# Patient Record
Sex: MALE | Race: WHITE | HISPANIC OR LATINO | Employment: OTHER | URBAN - METROPOLITAN AREA
[De-identification: names, ages, dates, MRNs, and addresses within clinical notes are randomized per-mention and may not be internally consistent; named-entity substitution may affect disease eponyms.]

---

## 2019-01-28 ENCOUNTER — HOSPITAL ENCOUNTER (INPATIENT)
Facility: HOSPITAL | Age: 44
LOS: 16 days | Discharge: HOME | DRG: 974 | End: 2019-02-13
Attending: EMERGENCY MEDICINE | Admitting: EMERGENCY MEDICINE
Payer: MEDICAID

## 2019-01-28 DIAGNOSIS — D61.818 PANCYTOPENIA: ICD-10-CM

## 2019-01-28 DIAGNOSIS — D69.6 THROMBOCYTOPENIA: ICD-10-CM

## 2019-01-28 DIAGNOSIS — B20 AIDS: ICD-10-CM

## 2019-01-28 DIAGNOSIS — A41.9 SEPSIS, DUE TO UNSPECIFIED ORGANISM: ICD-10-CM

## 2019-01-28 DIAGNOSIS — J96.01 ACUTE HYPOXEMIC RESPIRATORY FAILURE: ICD-10-CM

## 2019-01-28 DIAGNOSIS — B20 HIV DISEASE: ICD-10-CM

## 2019-01-28 DIAGNOSIS — B37.0 THRUSH: ICD-10-CM

## 2019-01-28 DIAGNOSIS — Z97.8 ENDOTRACHEALLY INTUBATED: ICD-10-CM

## 2019-01-28 DIAGNOSIS — R53.83 FATIGUE: ICD-10-CM

## 2019-01-28 DIAGNOSIS — J18.9 PNEUMONIA, UNSPECIFIED ORGANISM: Primary | ICD-10-CM

## 2019-01-28 DIAGNOSIS — E87.1 HYPONATREMIA: ICD-10-CM

## 2019-01-28 PROBLEM — E44.0 MALNUTRITION OF MODERATE DEGREE: Status: ACTIVE | Noted: 2019-01-28

## 2019-01-28 PROBLEM — R79.89 ELEVATED LFTS: Status: ACTIVE | Noted: 2019-01-28

## 2019-01-28 PROBLEM — R53.1 WEAKNESS: Status: ACTIVE | Noted: 2019-01-28

## 2019-01-28 PROBLEM — Z72.0 TOBACCO ABUSE: Status: ACTIVE | Noted: 2019-01-28

## 2019-01-28 LAB
ALBUMIN SERPL BCP-MCNC: 1.5 G/DL
ALLENS TEST: ABNORMAL
ALP SERPL-CCNC: 162 U/L
ALT SERPL W/O P-5'-P-CCNC: 46 U/L
ANION GAP SERPL CALC-SCNC: 7 MMOL/L
ANISOCYTOSIS BLD QL SMEAR: SLIGHT
AST SERPL-CCNC: 98 U/L
BASOPHILS NFR BLD: 0 %
BILIRUB SERPL-MCNC: 0.7 MG/DL
BILIRUB UR QL STRIP: NEGATIVE
BUN SERPL-MCNC: 15 MG/DL
CALCIUM SERPL-MCNC: 7.3 MG/DL
CHLORIDE SERPL-SCNC: 91 MMOL/L
CLARITY UR: CLEAR
CO2 SERPL-SCNC: 24 MMOL/L
COLOR UR: YELLOW
CREAT SERPL-MCNC: 0.7 MG/DL
CTP QC/QA: YES
DELSYS: ABNORMAL
DIFFERENTIAL METHOD: ABNORMAL
EOSINOPHIL NFR BLD: 0 %
ERYTHROCYTE [DISTWIDTH] IN BLOOD BY AUTOMATED COUNT: 14.5 %
EST. GFR  (AFRICAN AMERICAN): >60 ML/MIN/1.73 M^2
EST. GFR  (NON AFRICAN AMERICAN): >60 ML/MIN/1.73 M^2
FLOW: 5
FLUAV AG NPH QL: NEGATIVE
FLUBV AG NPH QL: NEGATIVE
GIANT PLATELETS BLD QL SMEAR: PRESENT
GLUCOSE SERPL-MCNC: 104 MG/DL
GLUCOSE UR QL STRIP: NEGATIVE
HCO3 UR-SCNC: 20.6 MMOL/L (ref 24–28)
HCT VFR BLD AUTO: 30.7 %
HGB BLD-MCNC: 10.5 G/DL
HGB UR QL STRIP: NEGATIVE
HIV1+2 IGG SERPL QL IA.RAPID: POSITIVE
HYPOCHROMIA BLD QL SMEAR: ABNORMAL
KETONES UR QL STRIP: NEGATIVE
LACTATE SERPL-SCNC: 2.3 MMOL/L
LACTATE SERPL-SCNC: 2.8 MMOL/L
LDH SERPL L TO P-CCNC: 1023 U/L
LEUKOCYTE ESTERASE UR QL STRIP: NEGATIVE
LYMPHOCYTES NFR BLD: 4 %
MAGNESIUM SERPL-MCNC: 2.1 MG/DL
MCH RBC QN AUTO: 24.6 PG
MCHC RBC AUTO-ENTMCNC: 34.2 G/DL
MCV RBC AUTO: 72 FL
MICROSCOPIC COMMENT: NORMAL
MODE: ABNORMAL
MONOCYTES NFR BLD: 1 %
NEUTROPHILS NFR BLD: 51 %
NEUTS BAND NFR BLD MANUAL: 44 %
NITRITE UR QL STRIP: NEGATIVE
OVALOCYTES BLD QL SMEAR: ABNORMAL
PCO2 BLDA: 29.3 MMHG (ref 35–45)
PH SMN: 7.45 [PH] (ref 7.35–7.45)
PH UR STRIP: 6 [PH] (ref 5–8)
PHOSPHATE SERPL-MCNC: 3.5 MG/DL
PLATELET # BLD AUTO: 75 K/UL
PLATELET BLD QL SMEAR: ABNORMAL
PMV BLD AUTO: ABNORMAL FL
PO2 BLDA: 58 MMHG (ref 80–100)
POC BE: -3 MMOL/L
POC SATURATED O2: 92 % (ref 95–100)
POC TCO2: 21 MMOL/L (ref 23–27)
POIKILOCYTOSIS BLD QL SMEAR: SLIGHT
POTASSIUM SERPL-SCNC: 3.8 MMOL/L
PROT SERPL-MCNC: 5.7 G/DL
PROT UR QL STRIP: NEGATIVE
RBC # BLD AUTO: 4.27 M/UL
SAMPLE: ABNORMAL
SITE: ABNORMAL
SODIUM SERPL-SCNC: 122 MMOL/L
SP GR UR STRIP: 1.01 (ref 1–1.03)
SP02: 89
SQUAMOUS #/AREA URNS HPF: 1 /HPF
TOXIC GRANULES BLD QL SMEAR: PRESENT
TROPONIN I SERPL DL<=0.01 NG/ML-MCNC: <0.006 NG/ML
TSH SERPL DL<=0.005 MIU/L-ACNC: 2.33 UIU/ML
URN SPEC COLLECT METH UR: ABNORMAL
UROBILINOGEN UR STRIP-ACNC: ABNORMAL EU/DL
WBC # BLD AUTO: 3.57 K/UL
WBC TOXIC VACUOLES BLD QL SMEAR: PRESENT

## 2019-01-28 PROCEDURE — 99285 EMERGENCY DEPT VISIT HI MDM: CPT | Mod: 25

## 2019-01-28 PROCEDURE — 25500020 PHARM REV CODE 255: Performed by: EMERGENCY MEDICINE

## 2019-01-28 PROCEDURE — 25000003 PHARM REV CODE 250: Performed by: EMERGENCY MEDICINE

## 2019-01-28 PROCEDURE — 96376 TX/PRO/DX INJ SAME DRUG ADON: CPT

## 2019-01-28 PROCEDURE — 99233 PR SUBSEQUENT HOSPITAL CARE,LEVL III: ICD-10-PCS | Mod: ,,, | Performed by: INTERNAL MEDICINE

## 2019-01-28 PROCEDURE — 84443 ASSAY THYROID STIM HORMONE: CPT

## 2019-01-28 PROCEDURE — 84484 ASSAY OF TROPONIN QUANT: CPT

## 2019-01-28 PROCEDURE — 36600 WITHDRAWAL OF ARTERIAL BLOOD: CPT

## 2019-01-28 PROCEDURE — 96367 TX/PROPH/DG ADDL SEQ IV INF: CPT | Mod: 59

## 2019-01-28 PROCEDURE — 84100 ASSAY OF PHOSPHORUS: CPT

## 2019-01-28 PROCEDURE — 63600175 PHARM REV CODE 636 W HCPCS: Performed by: INTERNAL MEDICINE

## 2019-01-28 PROCEDURE — 63600175 PHARM REV CODE 636 W HCPCS: Performed by: EMERGENCY MEDICINE

## 2019-01-28 PROCEDURE — 80053 COMPREHEN METABOLIC PANEL: CPT

## 2019-01-28 PROCEDURE — 83615 LACTATE (LD) (LDH) ENZYME: CPT

## 2019-01-28 PROCEDURE — 81000 URINALYSIS NONAUTO W/SCOPE: CPT

## 2019-01-28 PROCEDURE — 83735 ASSAY OF MAGNESIUM: CPT

## 2019-01-28 PROCEDURE — 94761 N-INVAS EAR/PLS OXIMETRY MLT: CPT

## 2019-01-28 PROCEDURE — 99900035 HC TECH TIME PER 15 MIN (STAT)

## 2019-01-28 PROCEDURE — 20000000 HC ICU ROOM

## 2019-01-28 PROCEDURE — S0039 INJECTION, SULFAMETHOXAZOLE: HCPCS | Performed by: INTERNAL MEDICINE

## 2019-01-28 PROCEDURE — 85027 COMPLETE CBC AUTOMATED: CPT

## 2019-01-28 PROCEDURE — 87389 HIV-1 AG W/HIV-1&-2 AB AG IA: CPT

## 2019-01-28 PROCEDURE — 93005 ELECTROCARDIOGRAM TRACING: CPT

## 2019-01-28 PROCEDURE — 96366 THER/PROPH/DIAG IV INF ADDON: CPT

## 2019-01-28 PROCEDURE — 63600175 PHARM REV CODE 636 W HCPCS: Performed by: PHYSICIAN ASSISTANT

## 2019-01-28 PROCEDURE — 80074 ACUTE HEPATITIS PANEL: CPT

## 2019-01-28 PROCEDURE — 25000003 PHARM REV CODE 250: Performed by: INTERNAL MEDICINE

## 2019-01-28 PROCEDURE — 86703 HIV-1/HIV-2 1 RESULT ANTBDY: CPT | Mod: 91

## 2019-01-28 PROCEDURE — 93010 EKG 12-LEAD: ICD-10-PCS | Mod: ,,, | Performed by: INTERNAL MEDICINE

## 2019-01-28 PROCEDURE — 93010 ELECTROCARDIOGRAM REPORT: CPT | Mod: ,,, | Performed by: INTERNAL MEDICINE

## 2019-01-28 PROCEDURE — 94640 AIRWAY INHALATION TREATMENT: CPT

## 2019-01-28 PROCEDURE — 96365 THER/PROPH/DIAG IV INF INIT: CPT

## 2019-01-28 PROCEDURE — 83605 ASSAY OF LACTIC ACID: CPT | Mod: 91

## 2019-01-28 PROCEDURE — 96361 HYDRATE IV INFUSION ADD-ON: CPT

## 2019-01-28 PROCEDURE — 85007 BL SMEAR W/DIFF WBC COUNT: CPT

## 2019-01-28 PROCEDURE — 25000242 PHARM REV CODE 250 ALT 637 W/ HCPCS: Performed by: EMERGENCY MEDICINE

## 2019-01-28 PROCEDURE — 86361 T CELL ABSOLUTE COUNT: CPT

## 2019-01-28 PROCEDURE — 96375 TX/PRO/DX INJ NEW DRUG ADDON: CPT

## 2019-01-28 PROCEDURE — 86703 HIV-1/HIV-2 1 RESULT ANTBDY: CPT

## 2019-01-28 PROCEDURE — 82803 BLOOD GASES ANY COMBINATION: CPT

## 2019-01-28 PROCEDURE — 87040 BLOOD CULTURE FOR BACTERIA: CPT | Mod: 59

## 2019-01-28 PROCEDURE — 99233 SBSQ HOSP IP/OBS HIGH 50: CPT | Mod: ,,, | Performed by: INTERNAL MEDICINE

## 2019-01-28 RX ORDER — IBUPROFEN 600 MG/1
600 TABLET ORAL
Status: COMPLETED | OUTPATIENT
Start: 2019-01-28 | End: 2019-01-28

## 2019-01-28 RX ORDER — ACETAMINOPHEN 500 MG
1000 TABLET ORAL
Status: COMPLETED | OUTPATIENT
Start: 2019-01-28 | End: 2019-01-28

## 2019-01-28 RX ORDER — ONDANSETRON 8 MG/1
8 TABLET, ORALLY DISINTEGRATING ORAL EVERY 8 HOURS PRN
Status: DISCONTINUED | OUTPATIENT
Start: 2019-01-28 | End: 2019-02-13 | Stop reason: HOSPADM

## 2019-01-28 RX ORDER — VANCOMYCIN HCL IN 5 % DEXTROSE 1G/250ML
15 PLASTIC BAG, INJECTION (ML) INTRAVENOUS
Status: DISCONTINUED | OUTPATIENT
Start: 2019-01-28 | End: 2019-01-29

## 2019-01-28 RX ORDER — METHYLPREDNISOLONE SOD SUCC 125 MG
60 VIAL (EA) INJECTION EVERY 6 HOURS
Status: DISCONTINUED | OUTPATIENT
Start: 2019-01-28 | End: 2019-02-04

## 2019-01-28 RX ORDER — SODIUM CHLORIDE 0.9 % (FLUSH) 0.9 %
5 SYRINGE (ML) INJECTION
Status: DISCONTINUED | OUTPATIENT
Start: 2019-01-28 | End: 2019-01-30

## 2019-01-28 RX ORDER — IPRATROPIUM BROMIDE AND ALBUTEROL SULFATE 2.5; .5 MG/3ML; MG/3ML
3 SOLUTION RESPIRATORY (INHALATION)
Status: COMPLETED | OUTPATIENT
Start: 2019-01-28 | End: 2019-01-28

## 2019-01-28 RX ORDER — SODIUM CHLORIDE 9 MG/ML
INJECTION, SOLUTION INTRAVENOUS CONTINUOUS
Status: DISCONTINUED | OUTPATIENT
Start: 2019-01-28 | End: 2019-01-30

## 2019-01-28 RX ORDER — FAMOTIDINE 20 MG/1
20 TABLET, FILM COATED ORAL 2 TIMES DAILY
Status: DISCONTINUED | OUTPATIENT
Start: 2019-01-28 | End: 2019-02-12

## 2019-01-28 RX ORDER — ACETAMINOPHEN 325 MG/1
650 TABLET ORAL EVERY 8 HOURS PRN
Status: DISCONTINUED | OUTPATIENT
Start: 2019-01-28 | End: 2019-02-03

## 2019-01-28 RX ORDER — VANCOMYCIN HCL IN 5 % DEXTROSE 1G/250ML
15 PLASTIC BAG, INJECTION (ML) INTRAVENOUS
Status: COMPLETED | OUTPATIENT
Start: 2019-01-28 | End: 2019-01-28

## 2019-01-28 RX ADMIN — AZITHROMYCIN MONOHYDRATE 500 MG: 500 INJECTION, POWDER, LYOPHILIZED, FOR SOLUTION INTRAVENOUS at 03:01

## 2019-01-28 RX ADMIN — PIPERACILLIN AND TAZOBACTAM 4.5 G: 4; .5 INJECTION, POWDER, LYOPHILIZED, FOR SOLUTION INTRAVENOUS; PARENTERAL at 10:01

## 2019-01-28 RX ADMIN — METHYLPREDNISOLONE SODIUM SUCCINATE 60 MG: 125 INJECTION, POWDER, FOR SOLUTION INTRAMUSCULAR; INTRAVENOUS at 01:01

## 2019-01-28 RX ADMIN — CEFTRIAXONE 2 G: 2 INJECTION, SOLUTION INTRAVENOUS at 04:01

## 2019-01-28 RX ADMIN — IPRATROPIUM BROMIDE AND ALBUTEROL SULFATE 3 ML: .5; 3 SOLUTION RESPIRATORY (INHALATION) at 09:01

## 2019-01-28 RX ADMIN — IBUPROFEN 600 MG: 600 TABLET ORAL at 10:01

## 2019-01-28 RX ADMIN — METHYLPREDNISOLONE SODIUM SUCCINATE 60 MG: 125 INJECTION, POWDER, FOR SOLUTION INTRAMUSCULAR; INTRAVENOUS at 11:01

## 2019-01-28 RX ADMIN — FAMOTIDINE 20 MG: 20 TABLET ORAL at 08:01

## 2019-01-28 RX ADMIN — IPRATROPIUM BROMIDE AND ALBUTEROL SULFATE 3 ML: .5; 3 SOLUTION RESPIRATORY (INHALATION) at 11:01

## 2019-01-28 RX ADMIN — SODIUM CHLORIDE: 0.9 INJECTION, SOLUTION INTRAVENOUS at 08:01

## 2019-01-28 RX ADMIN — VANCOMYCIN HYDROCHLORIDE 1000 MG: 1 INJECTION, POWDER, LYOPHILIZED, FOR SOLUTION INTRAVENOUS at 10:01

## 2019-01-28 RX ADMIN — VANCOMYCIN HYDROCHLORIDE 1000 MG: 1 INJECTION, POWDER, FOR SOLUTION INTRAVENOUS at 11:01

## 2019-01-28 RX ADMIN — IOHEXOL 75 ML: 350 INJECTION, SOLUTION INTRAVENOUS at 10:01

## 2019-01-28 RX ADMIN — SODIUM CHLORIDE 1000 ML: 0.9 INJECTION, SOLUTION INTRAVENOUS at 09:01

## 2019-01-28 RX ADMIN — SULFAMETHOXAZOLE AND TRIMETHOPRIM 295 MG: 80; 16 INJECTION, SOLUTION, CONCENTRATE INTRAVENOUS at 12:01

## 2019-01-28 RX ADMIN — METHYLPREDNISOLONE SODIUM SUCCINATE 60 MG: 125 INJECTION, POWDER, FOR SOLUTION INTRAMUSCULAR; INTRAVENOUS at 06:01

## 2019-01-28 RX ADMIN — ACETAMINOPHEN 1000 MG: 500 TABLET, FILM COATED ORAL at 09:01

## 2019-01-28 RX ADMIN — PIPERACILLIN AND TAZOBACTAM 4.5 G: 4; .5 INJECTION, POWDER, LYOPHILIZED, FOR SOLUTION INTRAVENOUS; PARENTERAL at 08:01

## 2019-01-28 RX ADMIN — SULFAMETHOXAZOLE AND TRIMETHOPRIM 295 MG: 80; 16 INJECTION, SOLUTION, CONCENTRATE INTRAVENOUS at 08:01

## 2019-01-28 RX ADMIN — SODIUM CHLORIDE 1000 ML: 0.9 INJECTION, SOLUTION INTRAVENOUS at 10:01

## 2019-01-28 NOTE — HPI
"Mr. Donnelly is a 42 yo M with no reported past medical history- presents with a CC of weakness and some SOB for one week. He notes that he was treated with abx for a "pneumonia" 1/13-1/23.  He presents to the ED and is found to have thrush. Further, his HIV is +. The patient was started on treatment for PJP pneumonia and ID was consulted. The patient is thin and ill appearing.    "

## 2019-01-28 NOTE — SUBJECTIVE & OBJECTIVE
History reviewed. No pertinent past medical history.    Past Surgical History:   Procedure Laterality Date    APPENDECTOMY         Review of patient's allergies indicates:  No Known Allergies    No current facility-administered medications on file prior to encounter.      Current Outpatient Medications on File Prior to Encounter   Medication Sig    famotidine (PEPCID) 20 MG tablet Take 1 tablet (20 mg total) by mouth once daily.     Family History     Problem Relation (Age of Onset)    No Known Problems Mother, Father        Tobacco Use    Smoking status: Current Every Day Smoker    Smokeless tobacco: Never Used   Substance and Sexual Activity    Alcohol use: Yes    Drug use: No    Sexual activity: Yes     Partners: Female     Review of Systems   Unable to perform ROS: Other     Objective:     Vital Signs (Most Recent):  Temp: (!) 101 °F (38.3 °C) (01/28/19 1052)  Pulse: 110 (01/28/19 1133)  Resp: (!) 24 (01/28/19 1133)  BP: 111/62(Dr. De La O notified) (01/28/19 0902)  SpO2: (!) 89 % (01/28/19 1133) Vital Signs (24h Range):  Temp:  [97.5 °F (36.4 °C)-101.1 °F (38.4 °C)] 101 °F (38.3 °C)  Pulse:  [] 110  Resp:  [18-24] 24  SpO2:  [89 %-98 %] 89 %  BP: ()/(57-76) 111/62     Weight: 59 kg (130 lb)  Body mass index is 20.98 kg/m².    Physical Exam   Constitutional: He is oriented to person, place, and time. He appears well-developed.   HENT:   Head: Normocephalic.   Tongue + for thrush.   Cardiovascular: Normal rate and regular rhythm. Exam reveals no gallop and no friction rub.   Pulmonary/Chest: Effort normal. No stridor. No respiratory distress. He has no wheezes.   Abdominal: Soft. Bowel sounds are normal. There is no tenderness.   Neurological: He is alert and oriented to person, place, and time.   Skin: Skin is warm and dry.   Psychiatric: He has a normal mood and affect. His behavior is normal.   Vitals reviewed.          Significant Labs:   Blood Culture: No results for input(s): LABBLOO in the  last 48 hours.  BMP:   Recent Labs   Lab 01/28/19  0700      *   K 3.8   CL 91*   CO2 24   BUN 15   CREATININE 0.7   CALCIUM 7.3*   MG 2.1     CBC:   Recent Labs   Lab 01/28/19  0842   WBC 3.57*   HGB 10.5*   HCT 30.7*   PLT 75*       Significant Imaging: CXR- reviewed

## 2019-01-28 NOTE — ED PROVIDER NOTES
"Encounter Date: 1/28/2019    SCRIBE #1 NOTE: I, Raphael Bowenjayne, am scribing for, and in the presence of,  Guerita De La O MD. I have scribed the following portions of the note - Other sections scribed: HPI, ROS and PE.       History     Chief Complaint   Patient presents with    Fatigue     x8 days Pt states "i fell bad "      CC: Fatigue    HPI: This is a 43 y.o. male no PMHx who presents with an 8-day history of generalized fatigue. Patient was diagnosed with pneumonia at an unknown clinic on the 13th or 14th and was given a course for antibiotics which he completed on the 23rd. States he felt better for a few days but began feeling bad again a couple of days ago. No other pmhx. No meds. Conversations were via ED . No rashes, no cp, no sob, no abd pain, n/v/d.      The history is provided by the patient. The history is limited by a language barrier. A  was used.     Review of patient's allergies indicates:  No Known Allergies  Past Medical History:   Diagnosis Date    Cigarette smoker     HIV (human immunodeficiency virus infection) 01/28/2019    Pneumonia 01/28/2019    Unintentional weight loss 01/28/2019    Weakness 01/28/2019     Past Surgical History:   Procedure Laterality Date    APPENDECTOMY       Family History   Problem Relation Age of Onset    No Known Problems Mother     No Known Problems Father      Social History     Tobacco Use    Smoking status: Current Every Day Smoker     Packs/day: 1.00    Smokeless tobacco: Never Used   Substance Use Topics    Alcohol use: Yes    Drug use: No     Review of Systems   Constitutional: Positive for activity change, appetite change, chills and fatigue.   Respiratory: Positive for cough.    All other systems reviewed and are negative.      Physical Exam     Initial Vitals [01/28/19 0618]   BP Pulse Resp Temp SpO2   112/76 96 18 97.5 °F (36.4 °C) 98 %      MAP       --         Physical Exam    Nursing note and vitals " reviewed.  Constitutional: He is not diaphoretic. No distress.   Pt appears thin. Appears to not feel well. Appears  Chronically ill.    HENT:   Head: Normocephalic and atraumatic.   Mouth/Throat: Oropharynx is clear and moist.   He has thrush diffusely over tongue, posterior oropharynx, bilateral buccal mucosa.   Eyes: Conjunctivae and EOM are normal. Pupils are equal, round, and reactive to light. No scleral icterus.   Neck: Normal range of motion. Neck supple. No JVD present.   Cardiovascular: Normal rate, regular rhythm and intact distal pulses.   Pulmonary/Chest: No stridor. No respiratory distress.   Minimal crackles to bilateral bases.   Abdominal: Soft. Bowel sounds are normal. He exhibits no distension. There is no tenderness.   Musculoskeletal: Normal range of motion. He exhibits no edema or tenderness.   Neurological: He is alert and oriented to person, place, and time. He has normal strength. No cranial nerve deficit.   Skin: Skin is warm and dry. No rash noted.   Psychiatric: He has a normal mood and affect. Thought content normal.         ED Course   Critical Care  Date/Time: 1/29/2019 4:46 AM  Performed by: Guerita De La O MD  Authorized by: Steph Guillen MD   Direct patient critical care time: 15 minutes  Ordering / reviewing critical care time: 10 minutes  Documentation critical care time: 10 minutes  Consulting other physicians critical care time: 10 minutes  Total critical care time (exclusive of procedural time) : 45 minutes        Labs Reviewed   CBC W/ AUTO DIFFERENTIAL - Abnormal; Notable for the following components:       Result Value    WBC 3.57 (*)     RBC 4.27 (*)     Hemoglobin 10.5 (*)     Hematocrit 30.7 (*)     MCV 72 (*)     MCH 24.6 (*)     Platelets 75 (*)     Lymph% 4.0 (*)     Mono% 1.0 (*)     Platelet Estimate Decreased (*)     All other components within normal limits    Narrative:     Recoll. [id number] by CHAD at 01/28/2019 08:01, reason: Questionable   results;  recollect to verify  notified Amanda Mathew; Rn to redraw   COMPREHENSIVE METABOLIC PANEL - Abnormal; Notable for the following components:    Sodium 122 (*)     Chloride 91 (*)     Calcium 7.3 (*)     Total Protein 5.7 (*)     Albumin 1.5 (*)     Alkaline Phosphatase 162 (*)     AST 98 (*)     ALT 46 (*)     Anion Gap 7 (*)     All other components within normal limits   URINALYSIS, REFLEX TO URINE CULTURE - Abnormal; Notable for the following components:    Urobilinogen, UA 2.0-3.0 (*)     All other components within normal limits    Narrative:     Preferred Collection Type->Urine, Clean Catch   LACTIC ACID, PLASMA - Abnormal; Notable for the following components:    Lactate (Lactic Acid) 2.3 (*)     All other components within normal limits   RAPID HIV - Abnormal; Notable for the following components:    HIV Rapid Testing Positive (*)     All other components within normal limits    Narrative:       Rapid HIV critical result(s) called and verbal readback obtained   from   Karissa Bowling., 01/28/2019 11:08   LACTATE DEHYDROGENASE - Abnormal; Notable for the following components:    LD 1,023 (*)     All other components within normal limits   LACTIC ACID, PLASMA - Abnormal; Notable for the following components:    Lactate (Lactic Acid) 2.8 (*)     All other components within normal limits   ISTAT PROCEDURE - Abnormal; Notable for the following components:    POC PH 7.454 (*)     POC PCO2 29.3 (*)     POC PO2 58 (*)     POC HCO3 20.6 (*)     POC SATURATED O2 92 (*)     POC TCO2 21 (*)     All other components within normal limits   CULTURE, BLOOD   CULTURE, BLOOD   AFB CULTURE & SMEAR   AFB CULTURE & SMEAR   AFB CULTURE & SMEAR   AFB CULTURE & SMEAR   CULTURE, RESPIRATORY   MAGNESIUM   PHOSPHORUS   TROPONIN I   TSH   URINALYSIS MICROSCOPIC    Narrative:     Preferred Collection Type->Urine, Clean Catch   HIV 1 / 2 ANTIBODY   T-HELPER CELLS (CD4) COUNT   HEPATITIS PANEL, ACUTE   M. TUBERCULOSIS DNA BY PCR   M. TUBERCULOSIS  DNA BY PCR   POCT INFLUENZA A/B     EKG Readings: (Independently Interpreted)   Initial Reading: No STEMI. Rhythm: Normal Sinus Rhythm. Ectopy: No Ectopy. Conduction: Normal.     ECG Results          EKG 12-lead (Final result)  Result time 01/28/19 16:19:09    Final result by Interface, Lab In [facility] (01/28/19 16:19:09)                 Narrative:    Test Reason : R53.83,    Vent. Rate : 093 BPM     Atrial Rate : 093 BPM     P-R Int : 144 ms          QRS Dur : 110 ms      QT Int : 376 ms       P-R-T Axes : 071 073 062 degrees     QTc Int : 467 ms    Normal sinus rhythm  Normal ECG  No previous ECGs available  Confirmed by Evans OROZCO, Nj ([id number]) on 1/28/2019 4:18:59 PM    Referred By: [email or web address]   SELF           Confirmed By:Nj Krueger MD                            Imaging Results          CTA Chest Non-Coronary (PE Study) (Final result)  Result time 01/28/19 12:04:47    Final result by Duglas Evans MD (01/28/19 12:04:47)                 Impression:      1. No pulmonary arterial thromboembolus.  2. 1.8 cm irregular, cavitary opacity within the anterior segment of the right upper lobe, innumerable randomly distributed bilateral pulmonary nodules, patchy areas of subsegmental consolidation and nodular thickening of the right major and minor fissures.  Constellation of findings is most concerning for an infectious granulomatous process (medially TB, fungal) with less likely differential considerations including a non infectious granulomatous process (sarcoidosis) and acute hypersensitivity pneumonitis.  3. Increased number of slightly prominent mediastinal lymph nodes, possibly inflammatory or infectious.  4. Small bilateral dependent pleural effusions.  Preliminary findings discussed with Dr. De La O at 11:45.      Electronically signed by: Duglas Evans MD  Date:    01/28/2019  Time:    12:04             Narrative:    EXAMINATION:  CTA CHEST NON CORONARY    CLINICAL HISTORY:  tachycardia,  hypoxemia;    TECHNIQUE:  Low dose axial images, sagittal and coronal reformations were obtained from the thoracic inlet to the lung bases following the IV administration of 75 mL of Omnipaque 350.  Contrast timing was optimized to evaluate the pulmonary arteries.  MIP images were performed.    COMPARISON:  Radiograph 01/28/2019.    FINDINGS:  Structures at the base of the neck are unremarkable.    There is a left-sided aortic arch with 3 branch vessels.  The thoracic aorta tapers normally without atherosclerotic calcification.  No dissection.    Pulmonary arteries distribute normally.  No definite filling defects identified up to the level of the proximal segmental pulmonary arteries.  Distal segmental and subsegmental vessels are not well evaluated due to adjacent parenchymal disease.    Heart is normal in size.  No pericardial effusion.    There is an increased number of slightly prominent mediastinal and bilateral hilar lymph nodes.  No axillary lymphadenopathy.    The esophagus is normal in caliber and course.    Trachea and proximal airways are patent.    There is a 1.8 cm irregular, cavitary opacity within the anterior segment of the right upper lobe that demonstrates a thick, slightly irregular wall measuring up to 4 mm.  Innumerable, randomly distributed pulmonary nodules are identified throughout both lungs, most of which are in the 1-2 mm range.  Patchy areas of consolidation noted within the dependent portions of the lower lobes.  There is thickening/nodularity involving the right minor and major fissures.  Small bilateral dependent pleural effusions identified.  No bronchiectasis.    The visualized spleen appears enlarged.  Limited evaluation of the remaining visualized upper abdominal structures demonstrates no significant abnormalities.    Subcutaneous soft tissues are within normal limits.    No acute fractures or osseous destruction.                               X-Ray Chest PA And Lateral (Final  result)  Result time 01/28/19 08:16:49    Final result by Carlos Manuel Moura MD (01/28/19 08:16:49)                 Impression:      Moderate lung opacification, possible cardiogenic/ noncardiogenic pulmonary edema, pneumonia, or aspiration.  Short-term imaging follow-up could be performed to ensure resolution.    Small pleural effusions.      Electronically signed by: Carlos Manuel Moura MD  Date:    01/28/2019  Time:    08:16             Narrative:    EXAMINATION:  XR CHEST PA AND LATERAL    CLINICAL HISTORY:  fatigue;    TECHNIQUE:  PA and lateral views of the chest were performed.    COMPARISON:  None    FINDINGS:  The cardiomediastinal contour is within normal limits.  There is moderate diffuse lung opacification.  No pneumothorax.  Probable small effusions.                                 Medical Decision Making:   Clinical Tests:   Lab Tests: Ordered and Reviewed  Radiological Study: Ordered and Reviewed  Medical Tests: Ordered and Reviewed  ED Management:  Mr Donnelly vitals were stable initially. About 1-2 hours into his stay, he developed a fever and his HR went up.   Tylenol given. cxr noted. Labs noted. Pt w hyponatremia. cxr w pneumonia picture. Pt appears chronically ill. Discussed w Dr. Mai, will admit pt to hospital. Currently on 4-5L NC, no tachypnea, has had O2 requirement since onset of fever. CT chest ordered.     CT chest returned, pt w appearance of diffuse TB, atypical pneumonia. Rapid HIV positive. Pt placed on isolation. Dr. Mai saw pt in ED. Admission orders written, awaiting bed placement. Pt occasionally drops sats to upper 80s then quickly comes back to upper 90s. ABG noted, pao2 58, pt placed on 50% venti mask, Dr. Mai notified.     Pt awaiting bed. Noted to drop sats to high 70s when resting, good waveform. Intermittently tachypneic. States he still feels fine. Intermittent bp drop below 100. I have concerns about decompensation. He has been seen by ID, additional orders in place  by Dr. Mai. I spoke w Dr. Edmondson and will be upgrading pt to ICU given some vitals changing in past hours. All are in agreement. Pt has been on isolation. His MS has remained unchanged.             Scribe Attestation:   Scribe #1: I performed the above scribed service and the documentation accurately describes the services I performed. I attest to the accuracy of the note.    Attending Attestation:           Physician Attestation for Scribe:  Physician Attestation Statement for Scribe #1: I, Guerita De La O MD, reviewed documentation, as scribed by Raphael Allen in my presence, and it is both accurate and complete.                    Clinical Impression:   The primary encounter diagnosis was Pneumonia, unspecified organism. Diagnoses of Fatigue, Hyponatremia, Thrush, and Sepsis, due to unspecified organism were also pertinent to this visit.                             Guerita De La O MD  01/29/19 9875

## 2019-01-28 NOTE — ASSESSMENT & PLAN NOTE
With hypoxia and sepsis. Will have broad spectrum Abx + treatment for PJP.  +/- steroids. Will discuss with ID.

## 2019-01-28 NOTE — HOSPITAL COURSE
Mr. Donnelly presented with shortness of breath and was admitted for treatment of PJP pneumonia and ID was consulted. The patient had a CTA of his chest that was negative for PE but was remarkable for a 1.8 cm irregular, cavitary opacity within the anterior segment of the right upper lobe, innumerable randomly distributed bilateral pulmonary nodules, patchy areas of subsegmental consolidation and nodular thickening of the right major and minor fissures, and mediastinal adenopathy.  Patient was started on empiric IV Bactrim and steroids.  He was transferred to the ICU due to high O2 requirements, rising lactate, and marginal blood pressures.  Pancytopenia was also present and worsened.  He was transfused 1 unit of platelets.  His blood pressures responded to IV fluid to alone and he did not require vasopressors. Pulmonology consulted for possible bronch in new diagnosis of HIV, and concern for PJP.  ID was also consulted.  On 1/30, CD4 count resulted at 11.  ID recommended bactrim and rocephin for probable PJP and CAP, respectively. Diflucan for oral candida. Pancytopenia likely due to sepsis, possible opportunistic infections and AIDS. Transaminases and lactate/LDH rising. On 1/31, patient underwent bronchoscopy and remained on the ventilator postprocedure.  He was later extubated on 2/01 with use of BiPAP p.r.n. Rapid AFB stain resulted negative and was taken off isolation on 2/2.  His oxygen requirements continued to decline and liver function tests continue to improve.  He was stable for transfer to floor on 2/3. Oxygen requirements improving with intermittent doses of IV furosemide 20 mg for 3 days.     Pt was weaned from oxygen and did not meet criteria for home oxygen on discharge home. ID made final recommendations:  Histoplasma pneumonia - itraconazole BID, repeat level in 14 days  Bactrim DS daily  - PJP ppx  Follow up Dr. Shine @[facility]  - HIV/HAART therapy

## 2019-01-28 NOTE — H&P
"[facility] Hospital Medicine  History & Physical    Patient Name: Lior Prado  MRN: [id number]  Admission Date: 1/28/2019  Attending Physician: Guerita De La O MD   Primary Care Provider: Primary Doctor No         Patient information was obtained from patient and ER records.     Subjective:     Principal Problem:HIV disease    Chief Complaint:   Chief Complaint   Patient presents with    Fatigue     x8 days Pt states "i fell bad "         HPI: Mr. Donnelly is a 44 yo M with no reported past medical history- presents with a CC of weakness and some SOB for one week. He notes that he was treated with abx for a "pneumonia" 1/13-1/23.  He presents to the ED and is found to have thrush. Further, his HIV is +. The patient was started on treatment for PJP pneumonia and ID was consulted. The patient is thin and ill appearing.      History reviewed. No pertinent past medical history.    Past Surgical History:   Procedure Laterality Date    APPENDECTOMY         Review of patient's allergies indicates:  No Known Allergies    No current facility-administered medications on file prior to encounter.      Current Outpatient Medications on File Prior to Encounter   Medication Sig    famotidine (PEPCID) 20 MG tablet Take 1 tablet (20 mg total) by mouth once daily.     Family History     Problem Relation (Age of Onset)    No Known Problems Mother, Father        Tobacco Use    Smoking status: Current Every Day Smoker    Smokeless tobacco: Never Used   Substance and Sexual Activity    Alcohol use: Yes    Drug use: No    Sexual activity: Yes     Partners: Female     Review of Systems   Unable to perform ROS: Other     Objective:     Vital Signs (Most Recent):  Temp: (!) 101 °F (38.3 °C) (01/28/19 1052)  Pulse: 110 (01/28/19 1133)  Resp: (!) 24 (01/28/19 1133)  BP: 111/62(Dr. De La O notified) (01/28/19 0902)  SpO2: (!) 89 % (01/28/19 1133) Vital Signs (24h Range):  Temp:  [97.5 °F (36.4 °C)-101.1 °F " (38.4 °C)] 101 °F (38.3 °C)  Pulse:  [] 110  Resp:  [18-24] 24  SpO2:  [89 %-98 %] 89 %  BP: ()/(57-76) 111/62     Weight: 59 kg (130 lb)  Body mass index is 20.98 kg/m².    Physical Exam   Constitutional: He is oriented to person, place, and time. He appears well-developed.   HENT:   Head: Normocephalic.   Tongue + for thrush.   Cardiovascular: Normal rate and regular rhythm. Exam reveals no gallop and no friction rub.   Pulmonary/Chest: Effort normal. No stridor. No respiratory distress. He has no wheezes.   Abdominal: Soft. Bowel sounds are normal. There is no tenderness.   Neurological: He is alert and oriented to person, place, and time.   Skin: Skin is warm and dry.   Psychiatric: He has a normal mood and affect. His behavior is normal.   Vitals reviewed.          Significant Labs:   Blood Culture: No results for input(s): LABBLOO in the last 48 hours.  BMP:   Recent Labs   Lab 01/28/19  0700      *   K 3.8   CL 91*   CO2 24   BUN 15   CREATININE 0.7   CALCIUM 7.3*   MG 2.1     CBC:   Recent Labs   Lab 01/28/19  0842   WBC 3.57*   HGB 10.5*   HCT 30.7*   PLT 75*       Significant Imaging: CXR- reviewed     Assessment/Plan:     * HIV disease           Pneumonia, unspecified organism    With hypoxia and sepsis. Will have broad spectrum Abx + treatment for PJP.  +/- steroids. Will discuss with ID.          Elevated LFTs    Not sure of cause- from ETOH? Vs other. Will check Hep panel.        Malnutrition of moderate degree    Will discuss with patient        Tobacco abuse    Smoking cessation education        Hyponatremia    Likely from lung involvement ?  SIADH?       Weakness    Will consider PT/OT consult        Thrush    Nystatin vs. Diflucan       Leukopenia- from HIV disease  VTE Risk Mitigation (From admission, onward)    None        Concerned about this patient. Monitor closely. To ICU if any decompensation. Have a call placed to ID to review case/treatment.  Started IV steroids  and treatment for PJP.  When I evaluated patient he was comfortable on NC.  Will check on before I leave hospital today.         Mac Marc MD  Department of Hospital Medicine   [facility]

## 2019-01-28 NOTE — ED TRIAGE NOTES
Pt presents to ED with c/o SOB and weakness onset this AM. Pt reports pneumonia diagnosis last month. Denies CP, nausea or dizziness

## 2019-01-29 PROBLEM — J96.01 ACUTE HYPOXEMIC RESPIRATORY FAILURE: Status: ACTIVE | Noted: 2019-01-29

## 2019-01-29 PROBLEM — D61.818 PANCYTOPENIA: Status: ACTIVE | Noted: 2019-01-29

## 2019-01-29 LAB
ABO + RH BLD: NORMAL
ALBUMIN SERPL BCP-MCNC: 1.2 G/DL
ALP SERPL-CCNC: 130 U/L
ALT SERPL W/O P-5'-P-CCNC: 32 U/L
ANION GAP SERPL CALC-SCNC: 8 MMOL/L
ANISOCYTOSIS BLD QL SMEAR: SLIGHT
APTT BLDCRRT: 34.5 SEC
AST SERPL-CCNC: 97 U/L
BASOPHILS # BLD AUTO: 0.01 K/UL
BASOPHILS NFR BLD: 0.5 %
BILIRUB SERPL-MCNC: 0.4 MG/DL
BLD GP AB SCN CELLS X3 SERPL QL: NORMAL
BLD PROD TYP BPU: NORMAL
BLOOD UNIT EXPIRATION DATE: NORMAL
BLOOD UNIT TYPE CODE: 5100
BLOOD UNIT TYPE: NORMAL
BNP SERPL-MCNC: 24 PG/ML
BUN SERPL-MCNC: 14 MG/DL
CALCIUM SERPL-MCNC: 7.2 MG/DL
CD3+CD4+ CELLS # BLD: 11 CELLS/UL (ref 300–1400)
CD3+CD4+ CELLS NFR BLD: 10.7 % (ref 28–57)
CHLORIDE SERPL-SCNC: 105 MMOL/L
CO2 SERPL-SCNC: 22 MMOL/L
CODING SYSTEM: NORMAL
CORTIS SERPL-MCNC: 6.8 UG/DL
CREAT SERPL-MCNC: 0.7 MG/DL
DIFFERENTIAL METHOD: ABNORMAL
DISPENSE STATUS: NORMAL
EOSINOPHIL # BLD AUTO: 0 K/UL
EOSINOPHIL NFR BLD: 0 %
ERYTHROCYTE [DISTWIDTH] IN BLOOD BY AUTOMATED COUNT: 14.3 %
EST. GFR  (AFRICAN AMERICAN): >60 ML/MIN/1.73 M^2
EST. GFR  (NON AFRICAN AMERICAN): >60 ML/MIN/1.73 M^2
GLUCOSE SERPL-MCNC: 112 MG/DL
HAV IGM SERPL QL IA: NEGATIVE
HBV CORE IGM SERPL QL IA: NEGATIVE
HBV SURFACE AG SERPL QL IA: NEGATIVE
HCT VFR BLD AUTO: 31.9 %
HCV AB SERPL QL IA: NEGATIVE
HGB BLD-MCNC: 10.8 G/DL
HIV 1+2 AB+HIV1 P24 AG SERPL QL IA: POSITIVE
HIV SUPPLEMENTAL ASSAY INTERPRETATION: ABNORMAL
HIV-1 RESULT: POSITIVE
HIV-2 RESULT: ABNORMAL
INR PPP: 1.3
LACTATE SERPL-SCNC: 3.1 MMOL/L
LACTATE SERPL-SCNC: 3.3 MMOL/L
LACTATE SERPL-SCNC: 3.8 MMOL/L
LACTATE SERPL-SCNC: 3.9 MMOL/L
LYMPHOCYTES # BLD AUTO: 0.2 K/UL
LYMPHOCYTES NFR BLD: 9.4 %
MCH RBC QN AUTO: 25.3 PG
MCHC RBC AUTO-ENTMCNC: 33.9 G/DL
MCV RBC AUTO: 75 FL
MONOCYTES # BLD AUTO: 0 K/UL
MONOCYTES NFR BLD: 0 %
NEUTROPHILS # BLD AUTO: 1.9 K/UL
NEUTROPHILS NFR BLD: 91 %
OVALOCYTES BLD QL SMEAR: ABNORMAL
PLATELET # BLD AUTO: 12 K/UL
PLATELET BLD QL SMEAR: ABNORMAL
PMV BLD AUTO: ABNORMAL FL
POIKILOCYTOSIS BLD QL SMEAR: SLIGHT
POTASSIUM SERPL-SCNC: 3.9 MMOL/L
PROT SERPL-MCNC: 4.9 G/DL
PROTHROMBIN TIME: 13.2 SEC
RBC # BLD AUTO: 4.27 M/UL
SODIUM SERPL-SCNC: 135 MMOL/L
TRANS PLATPHERESIS VOL PATIENT: NORMAL ML
VANCOMYCIN TROUGH SERPL-MCNC: 8.1 UG/ML
WBC # BLD AUTO: 2.12 K/UL

## 2019-01-29 PROCEDURE — 20000000 HC ICU ROOM

## 2019-01-29 PROCEDURE — 80202 ASSAY OF VANCOMYCIN: CPT

## 2019-01-29 PROCEDURE — 25000003 PHARM REV CODE 250: Performed by: HOSPITALIST

## 2019-01-29 PROCEDURE — 80053 COMPREHEN METABOLIC PANEL: CPT

## 2019-01-29 PROCEDURE — 36430 TRANSFUSION BLD/BLD COMPNT: CPT

## 2019-01-29 PROCEDURE — 63600175 PHARM REV CODE 636 W HCPCS: Performed by: INTERNAL MEDICINE

## 2019-01-29 PROCEDURE — 83605 ASSAY OF LACTIC ACID: CPT | Mod: 91

## 2019-01-29 PROCEDURE — 25000003 PHARM REV CODE 250: Performed by: INTERNAL MEDICINE

## 2019-01-29 PROCEDURE — 27100171 HC OXYGEN HIGH FLOW UP TO 24 HOURS

## 2019-01-29 PROCEDURE — 63600175 PHARM REV CODE 636 W HCPCS: Performed by: PHYSICIAN ASSISTANT

## 2019-01-29 PROCEDURE — 99291 CRITICAL CARE FIRST HOUR: CPT | Mod: ,,, | Performed by: NURSE PRACTITIONER

## 2019-01-29 PROCEDURE — 83880 ASSAY OF NATRIURETIC PEPTIDE: CPT

## 2019-01-29 PROCEDURE — 27100092 HC HIGH FLOW DELIVERY CANNULA

## 2019-01-29 PROCEDURE — 86777 TOXOPLASMA ANTIBODY: CPT

## 2019-01-29 PROCEDURE — 85025 COMPLETE CBC W/AUTO DIFF WBC: CPT

## 2019-01-29 PROCEDURE — 86480 TB TEST CELL IMMUN MEASURE: CPT

## 2019-01-29 PROCEDURE — 25000242 PHARM REV CODE 250 ALT 637 W/ HCPCS: Performed by: HOSPITALIST

## 2019-01-29 PROCEDURE — 25000242 PHARM REV CODE 250 ALT 637 W/ HCPCS: Performed by: NURSE PRACTITIONER

## 2019-01-29 PROCEDURE — 85610 PROTHROMBIN TIME: CPT

## 2019-01-29 PROCEDURE — 85730 THROMBOPLASTIN TIME PARTIAL: CPT

## 2019-01-29 PROCEDURE — 94761 N-INVAS EAR/PLS OXIMETRY MLT: CPT

## 2019-01-29 PROCEDURE — 63600175 PHARM REV CODE 636 W HCPCS: Performed by: EMERGENCY MEDICINE

## 2019-01-29 PROCEDURE — S0039 INJECTION, SULFAMETHOXAZOLE: HCPCS | Performed by: INTERNAL MEDICINE

## 2019-01-29 PROCEDURE — 86403 PARTICLE AGGLUT ANTBDY SCRN: CPT

## 2019-01-29 PROCEDURE — 99233 PR SUBSEQUENT HOSPITAL CARE,LEVL III: ICD-10-PCS | Mod: ,,, | Performed by: PHYSICIAN ASSISTANT

## 2019-01-29 PROCEDURE — 99900035 HC TECH TIME PER 15 MIN (STAT)

## 2019-01-29 PROCEDURE — 99233 SBSQ HOSP IP/OBS HIGH 50: CPT | Mod: ,,, | Performed by: PHYSICIAN ASSISTANT

## 2019-01-29 PROCEDURE — 36415 COLL VENOUS BLD VENIPUNCTURE: CPT

## 2019-01-29 PROCEDURE — 82533 TOTAL CORTISOL: CPT

## 2019-01-29 PROCEDURE — 86850 RBC ANTIBODY SCREEN: CPT

## 2019-01-29 PROCEDURE — 94640 AIRWAY INHALATION TREATMENT: CPT

## 2019-01-29 PROCEDURE — 25000003 PHARM REV CODE 250: Performed by: EMERGENCY MEDICINE

## 2019-01-29 PROCEDURE — P9035 PLATELET PHERES LEUKOREDUCED: HCPCS

## 2019-01-29 PROCEDURE — 87449 NOS EACH ORGANISM AG IA: CPT

## 2019-01-29 PROCEDURE — 99291 PR CRITICAL CARE, E/M 30-74 MINUTES: ICD-10-PCS | Mod: ,,, | Performed by: NURSE PRACTITIONER

## 2019-01-29 RX ORDER — SODIUM CHLORIDE FOR INHALATION 3 %
4 VIAL, NEBULIZER (ML) INHALATION ONCE
Status: COMPLETED | OUTPATIENT
Start: 2019-01-29 | End: 2019-01-29

## 2019-01-29 RX ORDER — IPRATROPIUM BROMIDE AND ALBUTEROL SULFATE 2.5; .5 MG/3ML; MG/3ML
3 SOLUTION RESPIRATORY (INHALATION) EVERY 4 HOURS
Status: DISCONTINUED | OUTPATIENT
Start: 2019-01-29 | End: 2019-02-10

## 2019-01-29 RX ORDER — VANCOMYCIN HCL IN 5 % DEXTROSE 1G/250ML
15 PLASTIC BAG, INJECTION (ML) INTRAVENOUS EVERY 6 HOURS
Status: DISCONTINUED | OUTPATIENT
Start: 2019-01-30 | End: 2019-01-30 | Stop reason: DRUGHIGH

## 2019-01-29 RX ORDER — VANCOMYCIN HCL IN 5 % DEXTROSE 1G/250ML
15 PLASTIC BAG, INJECTION (ML) INTRAVENOUS EVERY 6 HOURS
Status: DISCONTINUED | OUTPATIENT
Start: 2019-01-30 | End: 2019-01-29

## 2019-01-29 RX ORDER — HYDROCODONE BITARTRATE AND ACETAMINOPHEN 500; 5 MG/1; MG/1
TABLET ORAL
Status: DISCONTINUED | OUTPATIENT
Start: 2019-01-29 | End: 2019-01-30

## 2019-01-29 RX ORDER — FLUCONAZOLE 100 MG/1
200 TABLET ORAL ONCE
Status: COMPLETED | OUTPATIENT
Start: 2019-01-29 | End: 2019-01-29

## 2019-01-29 RX ORDER — IPRATROPIUM BROMIDE AND ALBUTEROL SULFATE 2.5; .5 MG/3ML; MG/3ML
3 SOLUTION RESPIRATORY (INHALATION)
Status: DISCONTINUED | OUTPATIENT
Start: 2019-01-29 | End: 2019-01-29

## 2019-01-29 RX ORDER — FLUCONAZOLE 100 MG/1
100 TABLET ORAL DAILY
Status: DISCONTINUED | OUTPATIENT
Start: 2019-01-30 | End: 2019-01-31

## 2019-01-29 RX ADMIN — SODIUM CHLORIDE: 0.9 INJECTION, SOLUTION INTRAVENOUS at 09:01

## 2019-01-29 RX ADMIN — IPRATROPIUM BROMIDE AND ALBUTEROL SULFATE 3 ML: .5; 3 SOLUTION RESPIRATORY (INHALATION) at 08:01

## 2019-01-29 RX ADMIN — VANCOMYCIN HYDROCHLORIDE 1000 MG: 1 INJECTION, POWDER, FOR SOLUTION INTRAVENOUS at 11:01

## 2019-01-29 RX ADMIN — SODIUM CHLORIDE: 0.9 INJECTION, SOLUTION INTRAVENOUS at 02:01

## 2019-01-29 RX ADMIN — METHYLPREDNISOLONE SODIUM SUCCINATE 60 MG: 125 INJECTION, POWDER, FOR SOLUTION INTRAMUSCULAR; INTRAVENOUS at 05:01

## 2019-01-29 RX ADMIN — CEFTRIAXONE 2 G: 2 INJECTION, SOLUTION INTRAVENOUS at 04:01

## 2019-01-29 RX ADMIN — SULFAMETHOXAZOLE AND TRIMETHOPRIM 295 MG: 80; 16 INJECTION, SOLUTION, CONCENTRATE INTRAVENOUS at 01:01

## 2019-01-29 RX ADMIN — FLUCONAZOLE 200 MG: 100 TABLET ORAL at 09:01

## 2019-01-29 RX ADMIN — FAMOTIDINE 20 MG: 20 TABLET ORAL at 09:01

## 2019-01-29 RX ADMIN — SULFAMETHOXAZOLE AND TRIMETHOPRIM 295 MG: 80; 16 INJECTION, SOLUTION, CONCENTRATE INTRAVENOUS at 09:01

## 2019-01-29 RX ADMIN — SODIUM CHLORIDE 500 ML: 0.9 INJECTION, SOLUTION INTRAVENOUS at 02:01

## 2019-01-29 RX ADMIN — METHYLPREDNISOLONE SODIUM SUCCINATE 60 MG: 125 INJECTION, POWDER, FOR SOLUTION INTRAMUSCULAR; INTRAVENOUS at 11:01

## 2019-01-29 RX ADMIN — IPRATROPIUM BROMIDE AND ALBUTEROL SULFATE 3 ML: .5; 3 SOLUTION RESPIRATORY (INHALATION) at 01:01

## 2019-01-29 RX ADMIN — METHYLPREDNISOLONE SODIUM SUCCINATE 125 MG: 125 INJECTION, POWDER, FOR SOLUTION INTRAMUSCULAR; INTRAVENOUS at 05:01

## 2019-01-29 RX ADMIN — METHYLPREDNISOLONE SODIUM SUCCINATE 60 MG: 125 INJECTION, POWDER, FOR SOLUTION INTRAMUSCULAR; INTRAVENOUS at 12:01

## 2019-01-29 RX ADMIN — SODIUM CHLORIDE: 0.9 INJECTION, SOLUTION INTRAVENOUS at 06:01

## 2019-01-29 RX ADMIN — FAMOTIDINE 20 MG: 20 TABLET ORAL at 08:01

## 2019-01-29 RX ADMIN — SULFAMETHOXAZOLE AND TRIMETHOPRIM 295 MG: 80; 16 INJECTION, SOLUTION, CONCENTRATE INTRAVENOUS at 04:01

## 2019-01-29 RX ADMIN — SODIUM CHLORIDE 30 MG/ML INHALATION SOLUTION 4 ML: 30 SOLUTION INHALANT at 01:01

## 2019-01-29 RX ADMIN — PIPERACILLIN AND TAZOBACTAM 4.5 G: 4; .5 INJECTION, POWDER, LYOPHILIZED, FOR SOLUTION INTRAVENOUS; PARENTERAL at 03:01

## 2019-01-29 NOTE — ASSESSMENT & PLAN NOTE
With hypoxia and sepsis. Will have broad spectrum Abx + treatment for PJP.  +/- steroids. Will discuss with ID.     pulm consulted - should have bronch for PJP, TB concerns  Wean oxygen as tolerated

## 2019-01-29 NOTE — SUBJECTIVE & OBJECTIVE
Review of Systems   Constitutional: Positive for diaphoresis, fatigue and unexpected weight change.   Respiratory: Positive for shortness of breath. Negative for cough.    Cardiovascular: Negative for chest pain, palpitations and leg swelling.   Gastrointestinal: Negative for diarrhea and nausea.   Skin: Positive for rash.   Neurological: Positive for weakness.       Objective:     Vital Signs (Most Recent):  Temp: 97.3 °F (36.3 °C) (01/29/19 1101)  Pulse: 93 (01/29/19 1352)  Resp: (!) 32 (01/29/19 1352)  BP: 107/67 (01/29/19 1246)  SpO2: (!) 92 % (01/29/19 1352) Vital Signs (24h Range):  Temp:  [93.6 °F (34.2 °C)-97.6 °F (36.4 °C)] 97.3 °F (36.3 °C)  Pulse:  [] 93  Resp:  [18-33] 32  SpO2:  [88 %-100 %] 92 %  BP: ()/(44-69) 107/67   Weight: 59.9 kg (132 lb 0.9 oz)  Body mass index is 21.31 kg/m².      Intake/Output Summary (Last 24 hours) at 1/29/2019 1405  Last data filed at 1/29/2019 1246  Gross per 24 hour   Intake 4976.17 ml   Output 1051 ml   Net 3925.17 ml       Physical Exam   Constitutional: He is oriented to person, place, and time. Vital signs are normal. He is cooperative. He has a sickly appearance. No distress. Face mask in place.   HENT:   Head: Normocephalic and atraumatic.   Eyes: Conjunctivae are normal. Pupils are equal, round, and reactive to light. Right eye exhibits no exudate. Left eye exhibits no exudate. Right conjunctiva has no hemorrhage. Left conjunctiva has no hemorrhage. No scleral icterus. Right eye exhibits no nystagmus. Left eye exhibits no nystagmus.   Neck: Trachea normal. No neck rigidity. No tracheal deviation present.   Cardiovascular: Normal rate, regular rhythm and normal heart sounds. PMI is not displaced. Exam reveals no gallop and no friction rub.   No murmur heard.  Pulses:       Radial pulses are 2+ on the right side, and 2+ on the left side.        Dorsalis pedis pulses are 1+ on the right side, and 1+ on the left side.   Pulmonary/Chest: No accessory muscle  usage. Tachypnea noted. He is in respiratory distress. He has no wheezes. He has rhonchi in the right middle field, the right lower field, the left middle field and the left lower field. He has rales in the right lower field and the left lower field.   Abdominal: Soft. Normal appearance and bowel sounds are normal. There is no tenderness.   Musculoskeletal: Normal range of motion.   Neurological: He is alert and oriented to person, place, and time. No cranial nerve deficit or sensory deficit. GCS eye subscore is 4. GCS verbal subscore is 5. GCS motor subscore is 6.   Skin: Skin is warm and dry. Capillary refill takes 2 to 3 seconds. He is not diaphoretic. No cyanosis. Nails show no clubbing.   Psychiatric: He has a normal mood and affect. His behavior is normal.   Nursing note and vitals reviewed.      Vents:  Pressure Support: 5 cmH20 (01/29/19 1351)  PEEP/CPAP: 5 cmH20 (01/29/19 1351)  Oxygen Concentration (%): 50 (01/29/19 1351)  Peak Airway Pressure: 10.5 cmH2O (01/29/19 1351)  Total Ve: 19.3 mL (01/29/19 1351)  Lines/Drains/Airways     Peripheral Intravenous Line                 Peripheral IV - Single Lumen 01/28/19 1012 Left Antecubital 1 day         Peripheral IV - Single Lumen 01/29/19 0913 Right Hand less than 1 day              Significant Labs:    CBC/Anemia Profile:  Recent Labs   Lab 01/28/19  0842 01/29/19  0652   WBC 3.57* 2.12*   HGB 10.5* 10.8*   HCT 30.7* 31.9*   PLT 75* 12*   MCV 72* 75*   RDW 14.5 14.3        Chemistries:  Recent Labs   Lab 01/28/19  0700 01/29/19  0653   * 135*   K 3.8 3.9   CL 91* 105   CO2 24 22*   BUN 15 14   CREATININE 0.7 0.7   CALCIUM 7.3* 7.2*   ALBUMIN 1.5* 1.2*   PROT 5.7* 4.9*   BILITOT 0.7 0.4   ALKPHOS 162* 130   ALT 46* 32   AST 98* 97*   MG 2.1  --    PHOS 3.5  --        All pertinent labs within the past 24 hours have been reviewed.    Significant Imaging:  I have reviewed all pertinent imaging results/findings within the past 24 hours.

## 2019-01-29 NOTE — PROGRESS NOTES
[facility]  Infectious Disease  Progress Note    Patient Name: Lior Prado  MRN: [id number]  Admission Date: 1/28/2019  Length of Stay: 1 days  Attending Physician: Nhung Carter MD  Primary Care Provider: Primary Doctor No    Isolation Status: Contact and Airborne  Assessment/Plan:      * HIV disease    42 y/o Peruvian speaking male with no significant medical history presents with progressively worsening SOB, cough, weakness and 6kg weight loss. Denies hemoptysis or sputum production. He was found to be positive for HIV. We are consulted further evaluation and management.     Blood cultures NGTD  Infectious workup pending      1. IV Bactrim 15mg/kg q6hr started for PJP pneumonia prophylaxis. continue vancomycin and ceftriaxone [id number] empirically. Trough goal 15-20. Steroids initiated. Will hold off adding therapy for MAC prophylaxis (macrolides,fqs) as monotherapy for MAC can increase drug resistance.   2. HIV confirmatory, CD4 counts pending. Genotype added   3. AFB cultures x 3 ordered, TB PCR testing x 2 pending (added to sputum cultures)   4. Additional studies ordered - toxoplasma IgG, fungal studies, quantiferon gold TB pending.   5. CTA chest 1.6 cm cavitation lesion of RUL.  6. Oral fluconazole for thrush   7. ID will follow closely with you.     Discussed with ID staff       Thank you for your consult. I will follow-up with patient. Please contact us if you have any additional questions.    Dania Rogers PA-C  Infectious Disease  [facility]  [phone]    Subjective:     Principal Problem:HIV disease    HPI: 42 y/o Peruvian speaking male with no significant medical history presents with progressively worsening SOB and weakness for the past week. Associated symptoms include dizziness. He reports having a cough (mostly at nights) for the past week. Denies hemoptysis or sputum production. He denies having any fever chills or night sweats at home. No  n/v/d,dysuria. He reports having dyspnea on exertion and 6kg weight loss. On admission he was found to be febrile and significantly diaphoretic. His HIV rapid test is positive. Concerns for PJP pneumonia. He is thin and ill appearing.    He works as a . He denies incarceration or homelessness. He denies knowing about HIV diagnosis. He smokes, denies IVDU or illicit drug use. He denies exposure to TB. No significant allergies at this time.     Blood cultures NGTD  He is febrile 101 and tachycardic  Interval History:   Feels better today. No longer diaphoretic no coughing overnight.  CT studies 1.8 cm nodule of RUL  Sputum cultures sent  Blood cultures NGTD  No coughs overnight  Infectious workup pending   Lactate 3.9 today   In no acute distress this AM      Review of Systems   Constitutional: Positive for fatigue. Negative for chills, diaphoresis and fever.   HENT:        +oral thrush   Respiratory: Positive for shortness of breath. Negative for cough.    Cardiovascular: Negative for chest pain and leg swelling.   Gastrointestinal: Negative for abdominal pain, diarrhea, nausea and rectal pain.   Genitourinary: Negative for dysuria.   Musculoskeletal: Positive for myalgias.   Neurological: Positive for weakness.   All other systems reviewed and are negative.    Objective:     Vital Signs (Most Recent):  Temp: (!) 94.7 °F (34.8 °C) (01/29/19 0701)  Pulse: 100 (01/29/19 0830)  Resp: (!) 25 (01/29/19 0830)  BP: (!) 103/57 (01/29/19 0830)  SpO2: 96 % (01/29/19 0830) Vital Signs (24h Range):  Temp:  [93.6 °F (34.2 °C)-98.3 °F (36.8 °C)] 94.7 °F (34.8 °C)  Pulse:  [] 100  Resp:  [18-33] 25  SpO2:  [89 %-100 %] 96 %  BP: ()/(44-68) 103/57     Weight: 59.9 kg (132 lb 0.9 oz)  Body mass index is 21.31 kg/m².    Estimated Creatinine Clearance: 115.3 mL/min (based on SCr of 0.7 mg/dL).    Physical Exam   Constitutional: He is oriented to person, place, and time. No distress.   Thin, ill appearing    HENT:   Head: Normocephalic.   Oral thrush noted   Eyes: Pupils are equal, round, and reactive to light.   Cardiovascular: Regular rhythm and normal heart sounds. Exam reveals no friction rub.   No murmur heard.  Pulmonary/Chest: Effort normal. No stridor. No respiratory distress. He has no wheezes.   Abdominal: Soft. He exhibits no distension. There is no tenderness. There is no guarding.   Musculoskeletal: Normal range of motion. He exhibits no edema, tenderness or deformity.   Neurological: He is alert and oriented to person, place, and time.   Skin: Skin is warm. He is not diaphoretic. No erythema. No pallor.   Psychiatric: He has a normal mood and affect.   Vitals reviewed.      Significant Labs:   Blood Culture:   Recent Labs   Lab 01/28/19  1023 01/28/19  1028   LABBLOO No Growth to date  No Growth to date No Growth to date  No Growth to date     C4 Count: No results for input(s): C4 in the last 48 hours.  CBC:   Recent Labs   Lab 01/28/19  0842 01/29/19  0652   WBC 3.57* 2.12*   HGB 10.5* 10.8*   HCT 30.7* 31.9*   PLT 75* 12*     CMP:   Recent Labs   Lab 01/28/19  0700 01/29/19  0653   * 135*   K 3.8 3.9   CL 91* 105   CO2 24 22*    112*   BUN 15 14   CREATININE 0.7 0.7   CALCIUM 7.3* 7.2*   PROT 5.7* 4.9*   ALBUMIN 1.5* 1.2*   BILITOT 0.7 0.4   ALKPHOS 162* 130   AST 98* 97*   ALT 46* 32   ANIONGAP 7* 8   EGFRNONAA >60 >60     Fungus Culture (Blood or Bone Marrow): No results for input(s): FUNGUSCULTUR in the last 4320 hours.  HIV 1/2 Antibodies: No results for input(s): [id number] in the last 48 hours.  HIV Rapid:   Recent Labs   Lab 01/28/19  0842   HIVRAPID Positive*     Procalcitonin: No results for input(s): PROCAL in the last 48 hours.  Quantiferon: No results for input(s): NIL, TBAG, TBAGNIL, MITOGENNIL, TBGOLD in the last 48 hours.  Respiratory Culture: No results for input(s): GSRESP, RESPIRATORYC in the last 4320 hours.  Urine Culture: No results for input(s): LABURIN in the last  4320 hours.  Urine Studies:   Recent Labs   Lab 01/28/19  0734   COLORU Yellow   APPEARANCEUA Clear   PHUR 6.0   SPECGRAV 1.015   PROTEINUA Negative   GLUCUA Negative   KETONESU Negative   BILIRUBINUA Negative   OCCULTUA Negative   NITRITE Negative   UROBILINOGEN 2.0-3.0*   LEUKOCYTESUR Negative   SQUAMEPITHEL 1     Wound Culture: No results for input(s): LABAERO in the last 4320 hours.  All pertinent labs within the past 24 hours have been reviewed.    Significant Imaging: I have reviewed all pertinent imaging results/findings within the past 24 hours.

## 2019-01-29 NOTE — PROGRESS NOTES
"[facility] Medicine  Progress Note    Patient Name: Lior Prado  MRN: [id number]  Patient Class: IP- Inpatient   Admission Date: 1/28/2019  Length of Stay: 1 days  Attending Physician: Nhung Carter MD  Primary Care Provider: Primary Doctor No        Subjective:     Principal Problem:HIV disease    HPI:  Mr. Donnelly is a 42 yo M with no reported past medical history- presents with a CC of weakness and some SOB for one week. He notes that he was treated with abx for a "pneumonia" 1/13-1/23.  He presents to the ED and is found to have thrush. Further, his HIV is +. The patient was started on treatment for PJP pneumonia and ID was consulted. The patient is thin and ill appearing.      Hospital Course:  Mr. Donnelly is a 42 yo M with no reported past medical history- presents with a CC of weakness and some SOB for one week. He notes that he was treated with abx for a "pneumonia" 1/13-1/23.  He presents to the ED and is found to have thrush. Further, his HIV is +. The patient was started on treatment for PJP pneumonia and ID was consulted. The patient had a CTA of his chest as well.     Pt was transferred to ICU due to high amounts of O2 requirements and rising lactate/marginal BP.  Pancytopenia worsening and will need one unit platelet transfused.  On IVF and no indication for vasopressor. Pulmonology consulted for possible bronch in new Dx HIV, concern for PJP. Awaiting HIV workup/CD4, etc. ID consulted and assisting with antibacterial meds. Awaiting cultures.       Interval History: used translation line and he consented for platelet transfusion     Review of Systems   Constitutional: Positive for diaphoresis, fatigue and fever. Negative for chills.   HENT:        +oral thrush   Respiratory: Positive for cough and shortness of breath.    Cardiovascular: Negative for chest pain and leg swelling.   Gastrointestinal: Negative for abdominal pain, diarrhea, nausea and rectal pain. "   Genitourinary: Negative for dysuria.   Musculoskeletal: Positive for myalgias.   Neurological: Positive for weakness.   All other systems reviewed and are negative.    Objective:     Vital Signs (Most Recent):  Temp: (!) 94.7 °F (34.8 °C) (01/29/19 0701)  Pulse: 100 (01/29/19 0830)  Resp: (!) 25 (01/29/19 0830)  BP: (!) 103/57 (01/29/19 0830)  SpO2: 96 % (01/29/19 0830) Vital Signs (24h Range):  Temp:  [93.6 °F (34.2 °C)-101 °F (38.3 °C)] 94.7 °F (34.8 °C)  Pulse:  [] 100  Resp:  [18-33] 25  SpO2:  [89 %-100 %] 96 %  BP: ()/(44-68) 103/57     Weight: 59.9 kg (132 lb 0.9 oz)  Body mass index is 21.31 kg/m².    Intake/Output Summary (Last 24 hours) at 1/29/2019 0947  Last data filed at 1/29/2019 0600  Gross per 24 hour   Intake 5829.17 ml   Output 1300 ml   Net 4529.17 ml      Physical Exam   Constitutional: He is oriented to person, place, and time. He appears distressed.   Thin, ill appearing     HENT:   Head: Normocephalic.   Oral thrush noted   Eyes: Pupils are equal, round, and reactive to light.   Cardiovascular: Regular rhythm and normal heart sounds. Exam reveals no friction rub.   No murmur heard.  tachycardic   Pulmonary/Chest: Effort normal. No stridor. No respiratory distress. He has no wheezes.   Abdominal: Soft. He exhibits no distension. There is no tenderness. There is no guarding.   Musculoskeletal: He exhibits no edema, tenderness or deformity.   Neurological: He is alert and oriented to person, place, and time.   Skin: Skin is warm. He is diaphoretic. No erythema. No pallor.   Psychiatric: He has a normal mood and affect.   Vitals reviewed.      Significant Labs:   ABGs:   Recent Labs   Lab 01/28/19  1133   PH 7.454*   PCO2 29.3*   HCO3 20.6*   POCSATURATED 92*   BE -3     Blood Culture:   Recent Labs   Lab 01/28/19  1023 01/28/19  1028   LABBLOO No Growth to date  No Growth to date No Growth to date  No Growth to date     BMP:   Recent Labs   Lab 01/28/19  0700 01/29/19  0653   GLU  104 112*   * 135*   K 3.8 3.9   CL 91* 105   CO2 24 22*   BUN 15 14   CREATININE 0.7 0.7   CALCIUM 7.3* 7.2*   MG 2.1  --      CBC:   Recent Labs   Lab 01/28/19  0842 01/29/19  0652   WBC 3.57* 2.12*   HGB 10.5* 10.8*   HCT 30.7* 31.9*   PLT 75* 12*     Lactic Acid:   Recent Labs   Lab 01/28/19  1815 01/29/19  0027 01/29/19  0654   LACTATE 2.8* 3.1* 3.9*     Magnesium:   Recent Labs   Lab 01/28/19  0700   MG 2.1     Respiratory Culture: No results for input(s): GSRESP, RESPIRATORYC in the last 48 hours.  Urine Culture: No results for input(s): LABURIN in the last 48 hours.    Significant Imaging: I have reviewed all pertinent imaging results/findings within the past 24 hours.    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 Vs other - + HIV/sepsis   Hepatitis panel negative       Malnutrition of moderate degree    Pt did not comment on changes in diet  + loose stools         Tobacco abuse    Smoking cessation education   Nicotine patch offered        Hyponatremia    Likely from lung involvement ?  SIADH?    Improved with IVF       Weakness    Will consider PT/OT consult when stable        Thrush    Started on diflucan        Pneumonia, unspecified organism    With hypoxia and sepsis. Will have broad spectrum Abx + treatment for PJP.  +/- steroids. Will  discuss with ID.     pulm consulted - should have bronch for PJP, TB concerns  Wean oxygen as tolerated             VTE Risk Mitigation (From admission, onward)        Ordered     IP VTE LOW RISK PATIENT  Once      01/28/19 1927     Place DENY hose  Until discontinued      01/28/19 1927          Critical care time spent on the evaluation and treatment of severe organ dysfunction, review of pertinent labs and imaging studies, discussions with consulting providers and discussions with patient/family: 50 minutes.    Nhung Carter MD  Department of Hospital Medicine   [facility]

## 2019-01-29 NOTE — SUBJECTIVE & OBJECTIVE
Interval History:   Feels better today. No longer diaphoretic no coughing overnight.  CT studies 1.8 cm nodule of RUL  Sputum cultures sent  Blood cultures NGTD  No coughs overnight  Infectious workup pending   Lactate 3.9 today   In no acute distress this AM      Review of Systems   Constitutional: Positive for fatigue. Negative for chills, diaphoresis and fever.   HENT:        +oral thrush   Respiratory: Positive for shortness of breath. Negative for cough.    Cardiovascular: Negative for chest pain and leg swelling.   Gastrointestinal: Negative for abdominal pain, diarrhea, nausea and rectal pain.   Genitourinary: Negative for dysuria.   Musculoskeletal: Positive for myalgias.   Neurological: Positive for weakness.   All other systems reviewed and are negative.    Objective:     Vital Signs (Most Recent):  Temp: (!) 94.7 °F (34.8 °C) (01/29/19 0701)  Pulse: 100 (01/29/19 0830)  Resp: (!) 25 (01/29/19 0830)  BP: (!) 103/57 (01/29/19 0830)  SpO2: 96 % (01/29/19 0830) Vital Signs (24h Range):  Temp:  [93.6 °F (34.2 °C)-98.3 °F (36.8 °C)] 94.7 °F (34.8 °C)  Pulse:  [] 100  Resp:  [18-33] 25  SpO2:  [89 %-100 %] 96 %  BP: ()/(44-68) 103/57     Weight: 59.9 kg (132 lb 0.9 oz)  Body mass index is 21.31 kg/m².    Estimated Creatinine Clearance: 115.3 mL/min (based on SCr of 0.7 mg/dL).    Physical Exam   Constitutional: He is oriented to person, place, and time. No distress.   Thin, ill appearing   HENT:   Head: Normocephalic.   Oral thrush noted   Eyes: Pupils are equal, round, and reactive to light.   Cardiovascular: Regular rhythm and normal heart sounds. Exam reveals no friction rub.   No murmur heard.  Pulmonary/Chest: Effort normal. No stridor. No respiratory distress. He has no wheezes.   Abdominal: Soft. He exhibits no distension. There is no tenderness. There is no guarding.   Musculoskeletal: Normal range of motion. He exhibits no edema, tenderness or deformity.   Neurological: He is alert and  oriented to person, place, and time.   Skin: Skin is warm. He is not diaphoretic. No erythema. No pallor.   Psychiatric: He has a normal mood and affect.   Vitals reviewed.      Significant Labs:   Blood Culture:   Recent Labs   Lab 01/28/19  1023 01/28/19  1028   LABBLOO No Growth to date  No Growth to date No Growth to date  No Growth to date     C4 Count: No results for input(s): C4 in the last 48 hours.  CBC:   Recent Labs   Lab 01/28/19  0842 01/29/19  0652   WBC 3.57* 2.12*   HGB 10.5* 10.8*   HCT 30.7* 31.9*   PLT 75* 12*     CMP:   Recent Labs   Lab 01/28/19  0700 01/29/19  0653   * 135*   K 3.8 3.9   CL 91* 105   CO2 24 22*    112*   BUN 15 14   CREATININE 0.7 0.7   CALCIUM 7.3* 7.2*   PROT 5.7* 4.9*   ALBUMIN 1.5* 1.2*   BILITOT 0.7 0.4   ALKPHOS 162* 130   AST 98* 97*   ALT 46* 32   ANIONGAP 7* 8   EGFRNONAA >60 >60     Fungus Culture (Blood or Bone Marrow): No results for input(s): FUNGUSCULTUR in the last 4320 hours.  HIV 1/2 Antibodies: No results for input(s): [id number] in the last 48 hours.  HIV Rapid:   Recent Labs   Lab 01/28/19  0842   HIVRAPID Positive*     Procalcitonin: No results for input(s): PROCAL in the last 48 hours.  Quantiferon: No results for input(s): NIL, TBAG, TBAGNIL, MITOGENNIL, TBGOLD in the last 48 hours.  Respiratory Culture: No results for input(s): GSRESP, RESPIRATORYC in the last 4320 hours.  Urine Culture: No results for input(s): LABURIN in the last 4320 hours.  Urine Studies:   Recent Labs   Lab 01/28/19  0734   COLORU Yellow   APPEARANCEUA Clear   PHUR 6.0   SPECGRAV 1.015   PROTEINUA Negative   GLUCUA Negative   KETONESU Negative   BILIRUBINUA Negative   OCCULTUA Negative   NITRITE Negative   UROBILINOGEN 2.0-3.0*   LEUKOCYTESUR Negative   SQUAMEPITHEL 1     Wound Culture: No results for input(s): LABAERO in the last 4320 hours.  All pertinent labs within the past 24 hours have been reviewed.    Significant Imaging: I have reviewed all pertinent imaging  results/findings within the past 24 hours.

## 2019-01-29 NOTE — ASSESSMENT & PLAN NOTE
42 y/o Hungarian speaking male with no significant medical history presents with progressively worsening SOB, cough, weakness and 6kg weight loss. Denies hemoptysis or sputum production. He was found to be positive for HIV. We are consulted further evaluation and management.     Blood cultures NGTD  He is febrile 101 and tachycardic    1. IV Bactrim 15mg/kg q6hr started for PJP pneumonia prophylaxis. Started ceftriaxone [id number] empirically. Steroids initiated. Will hold off adding therapy for MAC prophylaxis (macrolides,fqs) as monotherapy for MAC can increase drug resistance.   2. HIV confirmatory, CD4 counts pending. Genotype added   3. AFB cultures x 3 ordered, TB PCR testing x 2 pending (added to sputum cultures)   4. Additional studies ordered - toxoplasma IgG, fungal studies, quantiferon gold TB pending.   5. CTA chest 1.6 cm cavitation lesion of RUL.  6. Nystatin for thrush   7. ID will follow closely with you.

## 2019-01-29 NOTE — PLAN OF CARE
Problem: Adult Inpatient Plan of Care  Goal: Plan of Care Review  Outcome: Ongoing (interventions implemented as appropriate)  Pt remains in ICU overnight. AAOX4. Body temp low, bear hugger on, MD aware. Non-re breather, sats drop to 85-89% when asleep. Pt denies any pain. Antibiotics and IVF given as ordered. MD aware of increased Lactic acid level, new order given. Pt voids to urinal without difficulty. Pt remains free of any falls, injuries, or new skin breakdown during this shift

## 2019-01-29 NOTE — SUBJECTIVE & OBJECTIVE
Past Medical History:   Diagnosis Date    Cigarette smoker     HIV (human immunodeficiency virus infection) 01/28/2019    Pneumonia 01/28/2019    Unintentional weight loss 01/28/2019    Weakness 01/28/2019       Past Surgical History:   Procedure Laterality Date    APPENDECTOMY         Review of patient's allergies indicates:  No Known Allergies    Medications:    (Not in a hospital admission)  Antibiotics (From admission, onward)    Start     Stop Route Frequency Ordered    01/28/19 1615  cefTRIAXone (ROCEPHIN) 2 g in dextrose 5 % 50 mL IVPB      -- IV Every 24 hours (non-standard times) 01/28/19 1503    01/28/19 1300  sulfamethoxazole-trimethoprim 400-80 mg/5 mL (BACTRIM) 295 mg in dextrose 5 % 250 mL IVPB      -- IV Every 8 hours (non-standard times) 01/28/19 1208        Antifungals (From admission, onward)    None        Antivirals (From admission, onward)    None             There is no immunization history on file for this patient.    Family History     Problem Relation (Age of Onset)    No Known Problems Mother, Father        Social History     Socioeconomic History    Marital status:      Spouse name: None    Number of children: None    Years of education: None    Highest education level: None   Social Needs    Financial resource strain: None    Food insecurity - worry: None    Food insecurity - inability: None    Transportation needs - medical: None    Transportation needs - non-medical: None   Occupational History    None   Tobacco Use    Smoking status: Current Every Day Smoker     Packs/day: 1.00    Smokeless tobacco: Never Used   Substance and Sexual Activity    Alcohol use: Yes    Drug use: No    Sexual activity: Yes     Partners: Female   Other Topics Concern    None   Social History Narrative    None     Review of Systems   Constitutional: Positive for diaphoresis, fatigue and fever. Negative for chills.   HENT:        +oral thrush   Respiratory: Positive for cough and  shortness of breath.    Cardiovascular: Negative for chest pain and leg swelling.   Gastrointestinal: Negative for abdominal pain, diarrhea, nausea and rectal pain.   Genitourinary: Negative for dysuria.   Musculoskeletal: Positive for myalgias.   Neurological: Positive for weakness.   All other systems reviewed and are negative.    Objective:     Vital Signs (Most Recent):  Temp: 97.5 °F (36.4 °C) (01/28/19 1832)  Pulse: 75 (01/28/19 1832)  Resp: (!) 21 (01/28/19 1832)  BP: (!) 94/52 (01/28/19 1832)  SpO2: 100 % (01/28/19 1832) Vital Signs (24h Range):  Temp:  [97.5 °F (36.4 °C)-101.1 °F (38.4 °C)] 97.5 °F (36.4 °C)  Pulse:  [] 75  Resp:  [18-33] 21  SpO2:  [89 %-100 %] 100 %  BP: ()/(44-76) 94/52     Weight: 59 kg (130 lb)  Body mass index is 20.98 kg/m².    Estimated Creatinine Clearance: 113.6 mL/min (based on SCr of 0.7 mg/dL).    Physical Exam   Constitutional: He is oriented to person, place, and time. He appears distressed.   Thin, ill appearing     HENT:   Head: Normocephalic.   Oral thrush noted   Eyes: Pupils are equal, round, and reactive to light.   Cardiovascular: Regular rhythm and normal heart sounds. Exam reveals no friction rub.   No murmur heard.  tachycardic   Pulmonary/Chest: Effort normal. No stridor. No respiratory distress. He has no wheezes.   Abdominal: Soft. He exhibits no distension. There is no tenderness. There is no guarding.   Musculoskeletal: He exhibits no edema, tenderness or deformity.   Neurological: He is alert and oriented to person, place, and time.   Skin: Skin is warm. He is diaphoretic. No erythema. No pallor.   Psychiatric: He has a normal mood and affect.   Vitals reviewed.      Significant Labs:   Blood Culture:   Recent Labs   Lab 01/28/19  1023 01/28/19  1028   LABBLOO No Growth to date No Growth to date     CBC:   Recent Labs   Lab 01/28/19  0842   WBC 3.57*   HGB 10.5*   HCT 30.7*   PLT 75*     CMP:   Recent Labs   Lab 01/28/19  0700   *   K 3.8    CL 91*   CO2 24      BUN 15   CREATININE 0.7   CALCIUM 7.3*   PROT 5.7*   ALBUMIN 1.5*   BILITOT 0.7   ALKPHOS 162*   AST 98*   ALT 46*   ANIONGAP 7*   EGFRNONAA >60     Hepatitis Panel: No results for input(s): HEPBSAG, HEPAIGM, HEPCAB in the last 48 hours.    Invalid input(s): HAPBIGM  HIV 1/2 Antibodies: No results for input(s): [id number] in the last 48 hours.  HIV Rapid:   Recent Labs   Lab 01/28/19  0842   HIVRAPID Positive*     Lactic Acid:   Recent Labs   Lab 01/28/19  1028 01/28/19  1815   LACTATE 2.3* 2.8*     Quantiferon: No results for input(s): NIL, TBAG, TBAGNIL, MITOGENNIL, TBGOLD in the last 48 hours.  Respiratory Culture: No results for input(s): GSRESP, RESPIRATORYC in the last 4320 hours.  Urine Culture: No results for input(s): LABURIN in the last 4320 hours.  Urine Studies:   Recent Labs   Lab 01/28/19  0734   COLORU Yellow   APPEARANCEUA Clear   PHUR 6.0   SPECGRAV 1.015   PROTEINUA Negative   GLUCUA Negative   KETONESU Negative   BILIRUBINUA Negative   OCCULTUA Negative   NITRITE Negative   UROBILINOGEN 2.0-3.0*   LEUKOCYTESUR Negative   SQUAMEPITHEL 1     Wound Culture: No results for input(s): LABAERO in the last 4320 hours.  All pertinent labs within the past 24 hours have been reviewed.    Significant Imaging: I have reviewed all pertinent imaging results/findings within the past 24 hours.

## 2019-01-29 NOTE — ASSESSMENT & PLAN NOTE
--treating for PJP with bactrim  --azithromycin, rocephin and vancomycin as well  --concern for TB, AFB x3 ordered  --bronchoscopy on hold 2/2 high risk for complications due to increased oxygenation requirements and thrombocytopenia  --3% saline neb for sputum induction  --alternate BIPAP with high flow nasal cannula every 4 hours   --continue duo nebs q4

## 2019-01-29 NOTE — ASSESSMENT & PLAN NOTE
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

## 2019-01-29 NOTE — CONSULTS
[facility]  Infectious Disease  Consult Note    Patient Name: Lior Prado  MRN: [id number]  Admission Date: 1/28/2019  Hospital Length of Stay: 0 days  Attending Physician: Steph Guillen MD  Primary Care Provider: Primary Doctor No     Isolation Status: Contact and Airborne    Patient information was obtained from patient.      Inpatient consult to Infectious Diseases  Consult performed by: Dania Rogers PA-C  Consult ordered by: Mac Mai MD        Assessment/Plan:     * HIV disease    42 y/o Tajik speaking male with no significant medical history presents with progressively worsening SOB, cough, weakness and 6kg weight loss. Denies hemoptysis or sputum production. He was found to be positive for HIV. We are consulted further evaluation and management.     Blood cultures NGTD  He is febrile 101 and tachycardic    1. IV Bactrim 15mg/kg q6hr started for PJP pneumonia prophylaxis. Started ceftriaxone 4fh30pb empirically. Steroids initiated. Will hold off adding therapy for MAC prophylaxis (macrolides,fqs) as monotherapy for MAC can increase drug resistance.   2. HIV confirmatory, CD4 counts pending. Genotype added   3. AFB cultures x 3 ordered, TB PCR testing x 2 pending (added to sputum cultures)   4. Additional studies ordered - toxoplasma IgG, fungal studies, quantiferon gold TB pending.   5. CTA chest 1.6 cm cavitation lesion of RUL.  6. Nystatin for thrush   7. ID will follow closely with you.            Thank you for your consult. I will follow-up with patient. Please contact us if you have any additional questions.    Dania Rogers PA-C  Infectious Disease  [facility]  Pgr [phone]    Subjective:     Principal Problem: HIV disease    HPI: 42 y/o Tajik speaking male with no significant medical history presents with progressively worsening SOB and weakness for the past week. Associated symptoms include dizziness. He reports having a cough (mostly  at nights) for the past week. Denies hemoptysis or sputum production. He denies having any fever chills or night sweats at home. No n/v/d,dysuria. He reports having dyspnea on exertion and 6kg weight loss. On admission he was found to be febrile and significantly diaphoretic. His HIV rapid test is positive. Concerns for PJP pneumonia. He is thin and ill appearing.    He works as a . He denies incarceration or homelessness. He denies knowing about HIV diagnosis. He smokes, denies IVDU or illicit drug use. He denies exposure to TB. No significant allergies at this time.     Blood cultures NGTD  He is febrile 101 and tachycardic    Past Medical History:   Diagnosis Date    Cigarette smoker     HIV (human immunodeficiency virus infection) 01/28/2019    Pneumonia 01/28/2019    Unintentional weight loss 01/28/2019    Weakness 01/28/2019       Past Surgical History:   Procedure Laterality Date    APPENDECTOMY         Review of patient's allergies indicates:  No Known Allergies    Medications:    (Not in a hospital admission)  Antibiotics (From admission, onward)    Start     Stop Route Frequency Ordered    01/28/19 1615  cefTRIAXone (ROCEPHIN) 2 g in dextrose 5 % 50 mL IVPB      -- IV Every 24 hours (non-standard times) 01/28/19 1503    01/28/19 1300  sulfamethoxazole-trimethoprim 400-80 mg/5 mL (BACTRIM) 295 mg in dextrose 5 % 250 mL IVPB      -- IV Every 8 hours (non-standard times) 01/28/19 1208        Antifungals (From admission, onward)    None        Antivirals (From admission, onward)    None             There is no immunization history on file for this patient.    Family History     Problem Relation (Age of Onset)    No Known Problems Mother, Father        Social History     Socioeconomic History    Marital status:      Spouse name: None    Number of children: None    Years of education: None    Highest education level: None   Social Needs    Financial resource strain: None     Food insecurity - worry: None    Food insecurity - inability: None    Transportation needs - medical: None    Transportation needs - non-medical: None   Occupational History    None   Tobacco Use    Smoking status: Current Every Day Smoker     Packs/day: 1.00    Smokeless tobacco: Never Used   Substance and Sexual Activity    Alcohol use: Yes    Drug use: No    Sexual activity: Yes     Partners: Female   Other Topics Concern    None   Social History Narrative    None     Review of Systems   Constitutional: Positive for diaphoresis, fatigue and fever. Negative for chills.   HENT:        +oral thrush   Respiratory: Positive for cough and shortness of breath.    Cardiovascular: Negative for chest pain and leg swelling.   Gastrointestinal: Negative for abdominal pain, diarrhea, nausea and rectal pain.   Genitourinary: Negative for dysuria.   Musculoskeletal: Positive for myalgias.   Neurological: Positive for weakness.   All other systems reviewed and are negative.    Objective:     Vital Signs (Most Recent):  Temp: 97.5 °F (36.4 °C) (01/28/19 1832)  Pulse: 75 (01/28/19 1832)  Resp: (!) 21 (01/28/19 1832)  BP: (!) 94/52 (01/28/19 1832)  SpO2: 100 % (01/28/19 1832) Vital Signs (24h Range):  Temp:  [97.5 °F (36.4 °C)-101.1 °F (38.4 °C)] 97.5 °F (36.4 °C)  Pulse:  [] 75  Resp:  [18-33] 21  SpO2:  [89 %-100 %] 100 %  BP: ()/(44-76) 94/52     Weight: 59 kg (130 lb)  Body mass index is 20.98 kg/m².    Estimated Creatinine Clearance: 113.6 mL/min (based on SCr of 0.7 mg/dL).    Physical Exam   Constitutional: He is oriented to person, place, and time. He appears distressed.   Thin, ill appearing     HENT:   Head: Normocephalic.   Oral thrush noted   Eyes: Pupils are equal, round, and reactive to light.   Cardiovascular: Regular rhythm and normal heart sounds. Exam reveals no friction rub.   No murmur heard.  tachycardic   Pulmonary/Chest: Effort normal. No stridor. No respiratory distress. He has no  wheezes.   Abdominal: Soft. He exhibits no distension. There is no tenderness. There is no guarding.   Musculoskeletal: He exhibits no edema, tenderness or deformity.   Neurological: He is alert and oriented to person, place, and time.   Skin: Skin is warm. He is diaphoretic. No erythema. No pallor.   Psychiatric: He has a normal mood and affect.   Vitals reviewed.      Significant Labs:   Blood Culture:   Recent Labs   Lab 01/28/19  1023 01/28/19  1028   LABBLOO No Growth to date No Growth to date     CBC:   Recent Labs   Lab 01/28/19  0842   WBC 3.57*   HGB 10.5*   HCT 30.7*   PLT 75*     CMP:   Recent Labs   Lab 01/28/19  0700   *   K 3.8   CL 91*   CO2 24      BUN 15   CREATININE 0.7   CALCIUM 7.3*   PROT 5.7*   ALBUMIN 1.5*   BILITOT 0.7   ALKPHOS 162*   AST 98*   ALT 46*   ANIONGAP 7*   EGFRNONAA >60     Hepatitis Panel: No results for input(s): HEPBSAG, HEPAIGM, HEPCAB in the last 48 hours.    Invalid input(s): HAPBIGM  HIV 1/2 Antibodies: No results for input(s): [id number] in the last 48 hours.  HIV Rapid:   Recent Labs   Lab 01/28/19  0842   HIVRAPID Positive*     Lactic Acid:   Recent Labs   Lab 01/28/19  1028 01/28/19  1815   LACTATE 2.3* 2.8*     Quantiferon: No results for input(s): NIL, TBAG, TBAGNIL, MITOGENNIL, TBGOLD in the last 48 hours.  Respiratory Culture: No results for input(s): GSRESP, RESPIRATORYC in the last 4320 hours.  Urine Culture: No results for input(s): LABURIN in the last 4320 hours.  Urine Studies:   Recent Labs   Lab 01/28/19  0734   COLORU Yellow   APPEARANCEUA Clear   PHUR 6.0   SPECGRAV 1.015   PROTEINUA Negative   GLUCUA Negative   KETONESU Negative   BILIRUBINUA Negative   OCCULTUA Negative   NITRITE Negative   UROBILINOGEN 2.0-3.0*   LEUKOCYTESUR Negative   SQUAMEPITHEL 1     Wound Culture: No results for input(s): LABAERO in the last 4320 hours.  All pertinent labs within the past 24 hours have been reviewed.    Significant Imaging: I have reviewed all  pertinent imaging results/findings within the past 24 hours.

## 2019-01-29 NOTE — HPI
"Mr. Donnelly is a 44 yo M with no reported past medical history- presents with weakness and SOB for one week. He was treated with abx for a "pneumonia" 1/13-1/23 at outpatient facility. In ED, he was found to have thrush and rapid HIV+. CTA of his chest with a large RUL cavitary lesion and numerous smaller nodules noted to bilateral lungs concerning for PJP. Bactrim started with broad spectrum abx and ID was consulted for antibiotic management. He was transferred to ICU due to high amounts of O2 requirements and increaed lactate.  Pancytopenia worsening and one unit platelets transfused. Pulmonology consulted for possible bronch in new Dx HIV, concern for PJP. CD4 count pending.     Oxygen requirements present increased risk for safe bronch. Use 3% saline to induce sputum production to obtain sample. If respiratory status deteriorates and he requires intubation, bronchoscopy with BAL will be completed. Alternate high flow nasal cannula with BIPAP every 4 hours as tolerated. Plan is to intubate and bronch this am    *Mr. Donnelly is Swedish speaking only. Language line utilized for subjective questioning and to communicate plan of care  "

## 2019-01-29 NOTE — ASSESSMENT & PLAN NOTE
--new diagnosis, no disclosure to anyone, source of transmission unknown. Denies intercourse x 7 years, no IV drug use, no incarceration, no blood transfusions, no other sick contacts, he did get tattoos in last 7 years here in [place].  --continue bactrim, azithromycin, zosyn and vancomycin, oral diflucan   --CD4 count pending   --ID following

## 2019-01-29 NOTE — HPI
42 y/o Malaysian speaking male with no significant medical history presents with progressively worsening SOB and weakness for the past week. Associated symptoms include dizziness. He reports having a cough (mostly at nights) for the past week. Denies hemoptysis or sputum production. He denies having any fever chills or night sweats at home. No n/v/d,dysuria. He reports having dyspnea on exertion and 6kg weight loss. On admission he was found to be febrile and significantly diaphoretic. His HIV rapid test is positive. Concerns for PJP pneumonia. He is thin and ill appearing.    He works as a . He denies incarceration or homelessness. He denies knowing about HIV diagnosis. He smokes, denies IVDU or illicit drug use. He denies exposure to TB. No significant allergies at this time.     Blood cultures NGTD  He is febrile 101 and tachycardic

## 2019-01-29 NOTE — SUBJECTIVE & OBJECTIVE
Interval History: used translation line and he consented for platelet transfusion     Review of Systems   Constitutional: Positive for diaphoresis, fatigue and fever. Negative for chills.   HENT:        +oral thrush   Respiratory: Positive for cough and shortness of breath.    Cardiovascular: Negative for chest pain and leg swelling.   Gastrointestinal: Negative for abdominal pain, diarrhea, nausea and rectal pain.   Genitourinary: Negative for dysuria.   Musculoskeletal: Positive for myalgias.   Neurological: Positive for weakness.   All other systems reviewed and are negative.    Objective:     Vital Signs (Most Recent):  Temp: (!) 94.7 °F (34.8 °C) (01/29/19 0701)  Pulse: 100 (01/29/19 0830)  Resp: (!) 25 (01/29/19 0830)  BP: (!) 103/57 (01/29/19 0830)  SpO2: 96 % (01/29/19 0830) Vital Signs (24h Range):  Temp:  [93.6 °F (34.2 °C)-101 °F (38.3 °C)] 94.7 °F (34.8 °C)  Pulse:  [] 100  Resp:  [18-33] 25  SpO2:  [89 %-100 %] 96 %  BP: ()/(44-68) 103/57     Weight: 59.9 kg (132 lb 0.9 oz)  Body mass index is 21.31 kg/m².    Intake/Output Summary (Last 24 hours) at 1/29/2019 0947  Last data filed at 1/29/2019 0600  Gross per 24 hour   Intake 5829.17 ml   Output 1300 ml   Net 4529.17 ml      Physical Exam   Constitutional: He is oriented to person, place, and time. He appears distressed.   Thin, ill appearing     HENT:   Head: Normocephalic.   Oral thrush noted   Eyes: Pupils are equal, round, and reactive to light.   Cardiovascular: Regular rhythm and normal heart sounds. Exam reveals no friction rub.   No murmur heard.  tachycardic   Pulmonary/Chest: Effort normal. No stridor. No respiratory distress. He has no wheezes.   Abdominal: Soft. He exhibits no distension. There is no tenderness. There is no guarding.   Musculoskeletal: He exhibits no edema, tenderness or deformity.   Neurological: He is alert and oriented to person, place, and time.   Skin: Skin is warm. He is diaphoretic. No erythema. No pallor.    Psychiatric: He has a normal mood and affect.   Vitals reviewed.      Significant Labs:   ABGs:   Recent Labs   Lab 01/28/19  1133   PH 7.454*   PCO2 29.3*   HCO3 20.6*   POCSATURATED 92*   BE -3     Blood Culture:   Recent Labs   Lab 01/28/19  1023 01/28/19  1028   LABBLOO No Growth to date  No Growth to date No Growth to date  No Growth to date     BMP:   Recent Labs   Lab 01/28/19  0700 01/29/19  0653    112*   * 135*   K 3.8 3.9   CL 91* 105   CO2 24 22*   BUN 15 14   CREATININE 0.7 0.7   CALCIUM 7.3* 7.2*   MG 2.1  --      CBC:   Recent Labs   Lab 01/28/19  0842 01/29/19  0652   WBC 3.57* 2.12*   HGB 10.5* 10.8*   HCT 30.7* 31.9*   PLT 75* 12*     Lactic Acid:   Recent Labs   Lab 01/28/19  1815 01/29/19  0027 01/29/19  0654   LACTATE 2.8* 3.1* 3.9*     Magnesium:   Recent Labs   Lab 01/28/19  0700   MG 2.1     Respiratory Culture: No results for input(s): GSRESP, RESPIRATORYC in the last 48 hours.  Urine Culture: No results for input(s): LABURIN in the last 48 hours.    Significant Imaging: I have reviewed all pertinent imaging results/findings within the past 24 hours.

## 2019-01-29 NOTE — CONSULTS
"[facility]  Critical Care Medicine  Consult Note    Patient Name: Lior Prado  MRN: [id number]  Admission Date: 1/28/2019  Hospital Length of Stay: 1 days  Code Status: Full Code  Attending Physician: Nhung Carter MD   Primary Care Provider: Primary Doctor No   Principal Problem: HIV disease    Inpatient consult to Pulmonology  Consult performed by: Keaton Pritchett, NP  Consult ordered by: Nhung Carter MD        Subjective:     HPI:  Mr. Donnelly is a 44 yo M with no reported past medical history- presents with weakness and SOB for one week. He was treated with abx for a "pneumonia" 1/13-1/23 at outpatient facility. In ED, he was found to have thrush and rapid HIV+. CTA of his chest with a large RUL cavitary lesion and numerous smaller nodules noted to bilateral lungs concerning for PJP. Bactrim started with broad spectrum abx and ID was consulted for antibiotic management. He was transferred to ICU due to high amounts of O2 requirements and increaed lactate.  Pancytopenia worsening and one unit platelets transfused. Pulmonology consulted for possible bronch in new Dx HIV, concern for PJP. CD4 count pending.     Oxygen requirements present increased risk for safe bronch. Use 3% saline to induce sputum production to obtain sample. If respiratory status deteriorates and he requires intubation, bronchoscopy with BAL will be completed. Alternate venti mask with BIPAP every 4 hours as tolerated.     *Mr. Donnelly is Malay speaking only. Language line utilized for subjective questioning and to communicate plan of care    Hospital/ICU Course:  No notes on file    Review of Systems   Constitutional: Positive for diaphoresis, fatigue and unexpected weight change.   Respiratory: Positive for shortness of breath. Negative for cough.    Cardiovascular: Negative for chest pain, palpitations and leg swelling.   Gastrointestinal: Negative for diarrhea and nausea.   Skin: Positive for rash. "   Neurological: Positive for weakness.       Objective:     Vital Signs (Most Recent):  Temp: 97.3 °F (36.3 °C) (01/29/19 1101)  Pulse: 93 (01/29/19 1352)  Resp: (!) 32 (01/29/19 1352)  BP: 107/67 (01/29/19 1246)  SpO2: (!) 92 % (01/29/19 1352) Vital Signs (24h Range):  Temp:  [93.6 °F (34.2 °C)-97.6 °F (36.4 °C)] 97.3 °F (36.3 °C)  Pulse:  [] 93  Resp:  [18-33] 32  SpO2:  [88 %-100 %] 92 %  BP: ()/(44-69) 107/67   Weight: 59.9 kg (132 lb 0.9 oz)  Body mass index is 21.31 kg/m².      Intake/Output Summary (Last 24 hours) at 1/29/2019 1405  Last data filed at 1/29/2019 1246  Gross per 24 hour   Intake 4976.17 ml   Output 1051 ml   Net 3925.17 ml       Physical Exam   Constitutional: He is oriented to person, place, and time. Vital signs are normal. He is cooperative. He has a sickly appearance. No distress. Face mask in place.   HENT:   Head: Normocephalic and atraumatic.   Eyes: Conjunctivae are normal. Pupils are equal, round, and reactive to light. Right eye exhibits no exudate. Left eye exhibits no exudate. Right conjunctiva has no hemorrhage. Left conjunctiva has no hemorrhage. No scleral icterus. Right eye exhibits no nystagmus. Left eye exhibits no nystagmus.   Neck: Trachea normal. No neck rigidity. No tracheal deviation present.   Cardiovascular: Normal rate, regular rhythm and normal heart sounds. PMI is not displaced. Exam reveals no gallop and no friction rub.   No murmur heard.  Pulses:       Radial pulses are 2+ on the right side, and 2+ on the left side.        Dorsalis pedis pulses are 1+ on the right side, and 1+ on the left side.   Pulmonary/Chest: No accessory muscle usage. Tachypnea noted. He is in respiratory distress. He has no wheezes. He has rhonchi in the right middle field, the right lower field, the left middle field and the left lower field. He has rales in the right lower field and the left lower field.   Abdominal: Soft. Normal appearance and bowel sounds are normal. There is  no tenderness.   Musculoskeletal: Normal range of motion.   Neurological: He is alert and oriented to person, place, and time. No cranial nerve deficit or sensory deficit. GCS eye subscore is 4. GCS verbal subscore is 5. GCS motor subscore is 6.   Skin: Skin is warm and dry. Capillary refill takes 2 to 3 seconds. He is not diaphoretic. No cyanosis. Nails show no clubbing.   Psychiatric: He has a normal mood and affect. His behavior is normal.   Nursing note and vitals reviewed.      Vents:  Pressure Support: 5 cmH20 (01/29/19 1351)  PEEP/CPAP: 5 cmH20 (01/29/19 1351)  Oxygen Concentration (%): 50 (01/29/19 1351)  Peak Airway Pressure: 10.5 cmH2O (01/29/19 1351)  Total Ve: 19.3 mL (01/29/19 1351)  Lines/Drains/Airways     Peripheral Intravenous Line                 Peripheral IV - Single Lumen 01/28/19 1012 Left Antecubital 1 day         Peripheral IV - Single Lumen 01/29/19 0913 Right Hand less than 1 day              Significant Labs:    CBC/Anemia Profile:  Recent Labs   Lab 01/28/19  0842 01/29/19  0652   WBC 3.57* 2.12*   HGB 10.5* 10.8*   HCT 30.7* 31.9*   PLT 75* 12*   MCV 72* 75*   RDW 14.5 14.3        Chemistries:  Recent Labs   Lab 01/28/19  0700 01/29/19  0653   * 135*   K 3.8 3.9   CL 91* 105   CO2 24 22*   BUN 15 14   CREATININE 0.7 0.7   CALCIUM 7.3* 7.2*   ALBUMIN 1.5* 1.2*   PROT 5.7* 4.9*   BILITOT 0.7 0.4   ALKPHOS 162* 130   ALT 46* 32   AST 98* 97*   MG 2.1  --    PHOS 3.5  --        All pertinent labs within the past 24 hours have been reviewed.    Significant Imaging:  I have reviewed all pertinent imaging results/findings within the past 24 hours.      ABG  Recent Labs   Lab 01/28/19  1133   PH 7.454*   PO2 58*   PCO2 29.3*   HCO3 20.6*   BE -3     Assessment/Plan:     Pulmonary   Pneumonia, unspecified organism    --treating for PJP with bactrim  --azithromycin, rocephin and vancomycin as well  --concern for TB, AFB x3 ordered  --bronchoscopy on hold 2/2 high risk for complications due  to increased oxygenation requirements and thrombocytopenia  --3% saline neb for sputum induction  --alternate BIPAP with high flow nasal cannula every 4 hours   --continue duo nebs q4         ID   * HIV disease    --new diagnosis, no disclosure to anyone, source of transmission unknown. Denies intercourse x 7 years, no IV drug use, no incarceration, no blood transfusions, no other sick contacts, he did get tattoos in last 7 years here in [place].  --continue bactrim, azithromycin, zosyn and vancomycin, oral diflucan   --CD4 count pending   --ID following           Critical Care Time: 55 minutes  Critical secondary to Patient has a condition that poses threat to life and bodily function: Acute hypoxic respiratory failure     Critical care was time spent personally by me on the following activities: development of treatment plan with patient or surrogate and bedside caregivers, discussions with consultants, evaluation of patient's response to treatment, examination of patient, ordering and performing treatments and interventions, ordering and review of laboratory studies, ordering and review of radiographic studies, pulse oximetry, re-evaluation of patient's condition. This critical care time did not overlap with that of any other provider or involve time for any procedures.    Thank you for your consult. I will follow-up with patient. Please contact us if you have any additional questions.     Above discussed with Dr. Priya Pritchett NP  Critical Care Medicine  [facility]

## 2019-01-29 NOTE — EICU
Brief new admit note:    43 yr old male  With hx of HIV admitted for fever, cough, weight loss with sepsis from PNA pending AFB cultures for suspected Pulmonary cavitatory TB by ID doc.  On antibiotics and  steroids + Bactrim for suspected PJP and PNA.  CD4 pending.     Camera Eval:  Feels ok,  mentation seems fine.  BP 95/53.  HR 71, sats 97%. Cachectic. Discussed with bed side RN       Data:  Sod 122, Pancytopenia with hg 10.5, LA 3.1 going up.   CTA chest report reviewed.     Plan;  - continue ID recommendation and care  - on prophylaxis  - asp precautions  - received 2 lit fluids from ED.  Soft MAP.  Get Cortisol in am.  - on AFB isolation.   - stat  ml bolus and Cortisol for AM ordered. Follow UOP and lactate level.

## 2019-01-29 NOTE — ASSESSMENT & PLAN NOTE
42 y/o Taiwanese speaking male with no significant medical history presents with progressively worsening SOB, cough, weakness and 6kg weight loss. Denies hemoptysis or sputum production. He was found to be positive for HIV. We are consulted further evaluation and management.     Blood cultures NGTD  Infectious workup pending      1. IV Bactrim 15mg/kg q6hr started for PJP pneumonia prophylaxis. continue vancomycin and ceftriaxone [id number] empirically. Trough goal 15-20. Steroids initiated. Will hold off adding therapy for MAC prophylaxis (macrolides,fqs) as monotherapy for MAC can increase drug resistance.   2. HIV confirmatory, CD4 counts pending. Genotype added   3. AFB cultures x 3 ordered, TB PCR testing x 2 pending (added to sputum cultures)   4. Additional studies ordered - toxoplasma IgG, fungal studies, quantiferon gold TB pending.   5. CTA chest 1.6 cm cavitation lesion of RUL.  6. Oral fluconazole for thrush   7. ID will follow closely with you.     Discussed with ID staff

## 2019-01-29 NOTE — ASSESSMENT & PLAN NOTE
Secondary to HIV/?AIDS  Thrombocytopenia 12k - transfuse one unit platelets - anticipate bronch    - Coags pending - TTP common with HIV

## 2019-01-30 LAB
ALBUMIN SERPL BCP-MCNC: 1.3 G/DL
ALLENS TEST: ABNORMAL
ALP SERPL-CCNC: 151 U/L
ALT SERPL W/O P-5'-P-CCNC: 33 U/L
ANION GAP SERPL CALC-SCNC: 4 MMOL/L
ANISOCYTOSIS BLD QL SMEAR: SLIGHT
ANISOCYTOSIS BLD QL SMEAR: SLIGHT
AST SERPL-CCNC: 130 U/L
BASOPHILS # BLD AUTO: 0.01 K/UL
BASOPHILS # BLD AUTO: 0.01 K/UL
BASOPHILS NFR BLD: 0.3 %
BASOPHILS NFR BLD: 0.4 %
BILIRUB SERPL-MCNC: 0.3 MG/DL
BUN SERPL-MCNC: 16 MG/DL
C DIFF GDH STL QL: NEGATIVE
C DIFF TOX A+B STL QL IA: NEGATIVE
CALCIUM SERPL-MCNC: 7.3 MG/DL
CHLORIDE SERPL-SCNC: 107 MMOL/L
CO2 SERPL-SCNC: 22 MMOL/L
CREAT SERPL-MCNC: 0.7 MG/DL
CRYPTOC AG SER QL LA: NEGATIVE
DELSYS: ABNORMAL
DIFFERENTIAL METHOD: ABNORMAL
DIFFERENTIAL METHOD: ABNORMAL
DOHLE BOD BLD QL SMEAR: PRESENT
EOSINOPHIL # BLD AUTO: 0 K/UL
EOSINOPHIL # BLD AUTO: 0 K/UL
EOSINOPHIL NFR BLD: 0 %
EOSINOPHIL NFR BLD: 0 %
EP: 5
ERYTHROCYTE [DISTWIDTH] IN BLOOD BY AUTOMATED COUNT: 14.4 %
ERYTHROCYTE [DISTWIDTH] IN BLOOD BY AUTOMATED COUNT: 14.6 %
ERYTHROCYTE [SEDIMENTATION RATE] IN BLOOD BY WESTERGREN METHOD: 8 MM/H
EST. GFR  (AFRICAN AMERICAN): >60 ML/MIN/1.73 M^2
EST. GFR  (NON AFRICAN AMERICAN): >60 ML/MIN/1.73 M^2
FIBRINOGEN PPP-MCNC: 154 MG/DL
FIO2: 50
GIANT PLATELETS BLD QL SMEAR: PRESENT
GLUCOSE SERPL-MCNC: 136 MG/DL
HAPTOGLOB SERPL-MCNC: 247 MG/DL
HCO3 UR-SCNC: 16.7 MMOL/L (ref 24–28)
HCT VFR BLD AUTO: 27.4 %
HCT VFR BLD AUTO: 29.2 %
HGB BLD-MCNC: 9.1 G/DL
HGB BLD-MCNC: 9.8 G/DL
HYPOCHROMIA BLD QL SMEAR: ABNORMAL
HYPOCHROMIA BLD QL SMEAR: ABNORMAL
INR PPP: 1.3
IP: 12
IRON SERPL-MCNC: 73 UG/DL
LACTATE SERPL-SCNC: 3.7 MMOL/L
LACTATE SERPL-SCNC: 3.9 MMOL/L
LACTATE SERPL-SCNC: 4.5 MMOL/L
LACTATE SERPL-SCNC: 5 MMOL/L
LDH SERPL L TO P-CCNC: 1277 U/L
LYMPHOCYTES # BLD AUTO: 0.1 K/UL
LYMPHOCYTES # BLD AUTO: 0.2 K/UL
LYMPHOCYTES NFR BLD: 4.6 %
LYMPHOCYTES NFR BLD: 6.8 %
M TB IFN-G CD4+ BCKGRND COR BLD-ACNC: -0.01 IU/ML
MCH RBC QN AUTO: 24.7 PG
MCH RBC QN AUTO: 24.9 PG
MCHC RBC AUTO-ENTMCNC: 33.2 G/DL
MCHC RBC AUTO-ENTMCNC: 33.6 G/DL
MCV RBC AUTO: 74 FL
MCV RBC AUTO: 75 FL
MIN VOL: 21
MITOGEN IGNF BCKGRD COR BLD-ACNC: 0.01 IU/ML
MITOGEN IGNF BCKGRD COR BLD-ACNC: ABNORMAL [IU]/ML
MODE: ABNORMAL
MONOCYTES # BLD AUTO: 0 K/UL
MONOCYTES # BLD AUTO: 0 K/UL
MONOCYTES NFR BLD: 0.3 %
MONOCYTES NFR BLD: 1.2 %
NEUTROPHILS # BLD AUTO: 2.3 K/UL
NEUTROPHILS # BLD AUTO: 2.9 K/UL
NEUTROPHILS NFR BLD: 91.6 %
NEUTROPHILS NFR BLD: 94.8 %
NIL: 0.17 IU/ML
OVALOCYTES BLD QL SMEAR: ABNORMAL
OVALOCYTES BLD QL SMEAR: ABNORMAL
PATH REV BLD -IMP: NORMAL
PCO2 BLDA: 27.3 MMHG (ref 35–45)
PH SMN: 7.39 [PH] (ref 7.35–7.45)
PLATELET # BLD AUTO: 14 K/UL
PLATELET # BLD AUTO: 22 K/UL
PLATELET BLD QL SMEAR: ABNORMAL
PLATELET BLD QL SMEAR: ABNORMAL
PMV BLD AUTO: ABNORMAL FL
PMV BLD AUTO: ABNORMAL FL
PO2 BLDA: 46 MMHG (ref 80–100)
POC BE: -7 MMOL/L
POC SATURATED O2: 83 % (ref 95–100)
POC TCO2: 18 MMOL/L (ref 23–27)
POIKILOCYTOSIS BLD QL SMEAR: SLIGHT
POIKILOCYTOSIS BLD QL SMEAR: SLIGHT
POTASSIUM SERPL-SCNC: 4.3 MMOL/L
PROT SERPL-MCNC: 4.8 G/DL
PROTHROMBIN TIME: 13.7 SEC
RBC # BLD AUTO: 3.68 M/UL
RBC # BLD AUTO: 3.93 M/UL
RETICS/RBC NFR AUTO: 0.8 %
SAMPLE: ABNORMAL
SATURATED IRON: 82 %
SITE: ABNORMAL
SODIUM SERPL-SCNC: 133 MMOL/L
SP02: 94
SPONT RATE: 23
T GONDII IGG SER QL IA: NORMAL
T GONDII IGG SERPL IA-ACNC: <5 IU/ML
TB2 - NIL: -0.01 IU/ML
TOTAL IRON BINDING CAPACITY: 89 UG/DL
TOXIC GRANULES BLD QL SMEAR: PRESENT
TOXIC GRANULES BLD QL SMEAR: PRESENT
TRANSFERRIN SERPL-MCNC: 60 MG/DL
VIT B12 SERPL-MCNC: 1035 PG/ML
WBC # BLD AUTO: 2.5 K/UL
WBC # BLD AUTO: 3.03 K/UL
WBC #/AREA STL HPF: NORMAL /[HPF]
WBC TOXIC VACUOLES BLD QL SMEAR: ABNORMAL

## 2019-01-30 PROCEDURE — 63600175 PHARM REV CODE 636 W HCPCS: Performed by: INTERNAL MEDICINE

## 2019-01-30 PROCEDURE — 20000000 HC ICU ROOM

## 2019-01-30 PROCEDURE — 25000003 PHARM REV CODE 250: Performed by: HOSPITALIST

## 2019-01-30 PROCEDURE — 25000003 PHARM REV CODE 250: Performed by: INTERNAL MEDICINE

## 2019-01-30 PROCEDURE — 99233 SBSQ HOSP IP/OBS HIGH 50: CPT | Mod: ,,, | Performed by: INTERNAL MEDICINE

## 2019-01-30 PROCEDURE — 85025 COMPLETE CBC W/AUTO DIFF WBC: CPT | Mod: 91

## 2019-01-30 PROCEDURE — 87305 ASPERGILLUS AG IA: CPT

## 2019-01-30 PROCEDURE — 25000003 PHARM REV CODE 250: Performed by: EMERGENCY MEDICINE

## 2019-01-30 PROCEDURE — 94761 N-INVAS EAR/PLS OXIMETRY MLT: CPT

## 2019-01-30 PROCEDURE — 82803 BLOOD GASES ANY COMBINATION: CPT

## 2019-01-30 PROCEDURE — 85060 BLOOD SMEAR INTERPRETATION: CPT | Mod: ,,, | Performed by: PATHOLOGY

## 2019-01-30 PROCEDURE — 99900035 HC TECH TIME PER 15 MIN (STAT)

## 2019-01-30 PROCEDURE — 83010 ASSAY OF HAPTOGLOBIN QUANT: CPT

## 2019-01-30 PROCEDURE — 87449 NOS EACH ORGANISM AG IA: CPT

## 2019-01-30 PROCEDURE — 94640 AIRWAY INHALATION TREATMENT: CPT

## 2019-01-30 PROCEDURE — 85060 BLOOD SMEAR INTERPRETATION: CPT | Mod: 91,,, | Performed by: PATHOLOGY

## 2019-01-30 PROCEDURE — 27100092 HC HIGH FLOW DELIVERY CANNULA

## 2019-01-30 PROCEDURE — 87046 STOOL CULTR AEROBIC BACT EA: CPT | Mod: 59

## 2019-01-30 PROCEDURE — 36600 WITHDRAWAL OF ARTERIAL BLOOD: CPT

## 2019-01-30 PROCEDURE — 85384 FIBRINOGEN ACTIVITY: CPT

## 2019-01-30 PROCEDURE — 82607 VITAMIN B-12: CPT

## 2019-01-30 PROCEDURE — 99233 PR SUBSEQUENT HOSPITAL CARE,LEVL III: ICD-10-PCS | Mod: ,,, | Performed by: INTERNAL MEDICINE

## 2019-01-30 PROCEDURE — 27100171 HC OXYGEN HIGH FLOW UP TO 24 HOURS

## 2019-01-30 PROCEDURE — 99291 CRITICAL CARE FIRST HOUR: CPT | Mod: ,,, | Performed by: NURSE PRACTITIONER

## 2019-01-30 PROCEDURE — S0039 INJECTION, SULFAMETHOXAZOLE: HCPCS | Performed by: INTERNAL MEDICINE

## 2019-01-30 PROCEDURE — 87209 SMEAR COMPLEX STAIN: CPT

## 2019-01-30 PROCEDURE — 83615 LACTATE (LD) (LDH) ENZYME: CPT

## 2019-01-30 PROCEDURE — 83605 ASSAY OF LACTIC ACID: CPT

## 2019-01-30 PROCEDURE — 87427 SHIGA-LIKE TOXIN AG IA: CPT | Mod: 59

## 2019-01-30 PROCEDURE — 85610 PROTHROMBIN TIME: CPT

## 2019-01-30 PROCEDURE — 83605 ASSAY OF LACTIC ACID: CPT | Mod: 91

## 2019-01-30 PROCEDURE — 99291 PR CRITICAL CARE, E/M 30-74 MINUTES: ICD-10-PCS | Mod: ,,, | Performed by: NURSE PRACTITIONER

## 2019-01-30 PROCEDURE — 85060 PATHOLOGIST REVIEW: ICD-10-PCS | Mod: ,,, | Performed by: PATHOLOGY

## 2019-01-30 PROCEDURE — 63600175 PHARM REV CODE 636 W HCPCS: Performed by: PHYSICIAN ASSISTANT

## 2019-01-30 PROCEDURE — 85045 AUTOMATED RETICULOCYTE COUNT: CPT

## 2019-01-30 PROCEDURE — 83540 ASSAY OF IRON: CPT

## 2019-01-30 PROCEDURE — 36415 COLL VENOUS BLD VENIPUNCTURE: CPT

## 2019-01-30 PROCEDURE — 25000242 PHARM REV CODE 250 ALT 637 W/ HCPCS: Performed by: NURSE PRACTITIONER

## 2019-01-30 PROCEDURE — 94660 CPAP INITIATION&MGMT: CPT

## 2019-01-30 PROCEDURE — 89055 LEUKOCYTE ASSESSMENT FECAL: CPT

## 2019-01-30 PROCEDURE — 87045 FECES CULTURE AEROBIC BACT: CPT

## 2019-01-30 PROCEDURE — 80053 COMPREHEN METABOLIC PANEL: CPT

## 2019-01-30 PROCEDURE — 63600175 PHARM REV CODE 636 W HCPCS: Performed by: NURSE PRACTITIONER

## 2019-01-30 RX ORDER — SODIUM CHLORIDE FOR INHALATION 3 %
4 VIAL, NEBULIZER (ML) INHALATION ONCE
Status: COMPLETED | OUTPATIENT
Start: 2019-01-30 | End: 2019-01-30

## 2019-01-30 RX ORDER — FUROSEMIDE 10 MG/ML
40 INJECTION INTRAMUSCULAR; INTRAVENOUS ONCE
Status: COMPLETED | OUTPATIENT
Start: 2019-01-30 | End: 2019-01-30

## 2019-01-30 RX ADMIN — METHYLPREDNISOLONE SODIUM SUCCINATE 60 MG: 125 INJECTION, POWDER, FOR SOLUTION INTRAMUSCULAR; INTRAVENOUS at 11:01

## 2019-01-30 RX ADMIN — SODIUM CHLORIDE 30 MG/ML INHALATION SOLUTION 4 ML: 30 SOLUTION INHALANT at 12:01

## 2019-01-30 RX ADMIN — FUROSEMIDE 40 MG: 10 INJECTION, SOLUTION INTRAVENOUS at 11:01

## 2019-01-30 RX ADMIN — IPRATROPIUM BROMIDE AND ALBUTEROL SULFATE 3 ML: .5; 3 SOLUTION RESPIRATORY (INHALATION) at 03:01

## 2019-01-30 RX ADMIN — FAMOTIDINE 20 MG: 20 TABLET ORAL at 09:01

## 2019-01-30 RX ADMIN — METHYLPREDNISOLONE SODIUM SUCCINATE 60 MG: 125 INJECTION, POWDER, FOR SOLUTION INTRAMUSCULAR; INTRAVENOUS at 05:01

## 2019-01-30 RX ADMIN — VANCOMYCIN HYDROCHLORIDE 1500 MG: 1 INJECTION, POWDER, LYOPHILIZED, FOR SOLUTION INTRAVENOUS at 12:01

## 2019-01-30 RX ADMIN — SODIUM CHLORIDE: 0.9 INJECTION, SOLUTION INTRAVENOUS at 05:01

## 2019-01-30 RX ADMIN — SULFAMETHOXAZOLE AND TRIMETHOPRIM 295 MG: 80; 16 INJECTION, SOLUTION, CONCENTRATE INTRAVENOUS at 09:01

## 2019-01-30 RX ADMIN — IPRATROPIUM BROMIDE AND ALBUTEROL SULFATE 3 ML: .5; 3 SOLUTION RESPIRATORY (INHALATION) at 12:01

## 2019-01-30 RX ADMIN — IPRATROPIUM BROMIDE AND ALBUTEROL SULFATE 3 ML: .5; 3 SOLUTION RESPIRATORY (INHALATION) at 11:01

## 2019-01-30 RX ADMIN — FLUCONAZOLE 100 MG: 100 TABLET ORAL at 09:01

## 2019-01-30 RX ADMIN — CEFTRIAXONE 2 G: 2 INJECTION, SOLUTION INTRAVENOUS at 03:01

## 2019-01-30 RX ADMIN — SULFAMETHOXAZOLE AND TRIMETHOPRIM 295 MG: 80; 16 INJECTION, SOLUTION, CONCENTRATE INTRAVENOUS at 05:01

## 2019-01-30 RX ADMIN — IPRATROPIUM BROMIDE AND ALBUTEROL SULFATE 3 ML: .5; 3 SOLUTION RESPIRATORY (INHALATION) at 07:01

## 2019-01-30 RX ADMIN — IPRATROPIUM BROMIDE AND ALBUTEROL SULFATE 3 ML: .5; 3 SOLUTION RESPIRATORY (INHALATION) at 08:01

## 2019-01-30 RX ADMIN — VANCOMYCIN HYDROCHLORIDE 1500 MG: 1 INJECTION, POWDER, LYOPHILIZED, FOR SOLUTION INTRAVENOUS at 11:01

## 2019-01-30 RX ADMIN — SULFAMETHOXAZOLE AND TRIMETHOPRIM 295 MG: 80; 16 INJECTION, SOLUTION, CONCENTRATE INTRAVENOUS at 01:01

## 2019-01-30 NOTE — ASSESSMENT & PLAN NOTE
Pulm following  Pt treated with  antibiotics including bactrim and  steroids for suspected PJP and PNA  concerns for TB  CTA shows1.6 cm cavitation lesion of RUL  FOB  planned in am

## 2019-01-30 NOTE — SUBJECTIVE & OBJECTIVE
Interval History: more hypoxemic this AM, unable to tolerate being off of NIPPV. Denies pain, complains of intermittent palpitations when feeling short of breath.     Objective:     Vital Signs (Most Recent):  Temp: 97.2 °F (36.2 °C) (01/30/19 0800)  Pulse: (!) 132 (01/30/19 0900)  Resp: (!) 26 (01/30/19 0900)  BP: (!) 108/56 (01/30/19 0900)  SpO2: (!) 91 % (01/30/19 0900) Vital Signs (24h Range):  Temp:  [97.2 °F (36.2 °C)-100.3 °F (37.9 °C)] 97.2 °F (36.2 °C)  Pulse:  [] 132  Resp:  [22-38] 26  SpO2:  [63 %-98 %] 91 %  BP: ()/(51-69) 108/56     Weight: 59.8 kg (131 lb 13.4 oz)  Body mass index is 21.28 kg/m².      Intake/Output Summary (Last 24 hours) at 1/30/2019 1000  Last data filed at 1/30/2019 0800  Gross per 24 hour   Intake 4777 ml   Output 701 ml   Net 4076 ml       Physical Exam   Constitutional: He appears cachectic. He has a sickly appearance. He appears ill. No distress.   HENT:   Head: Normocephalic and atraumatic.   Eyes: Conjunctivae are normal. Pupils are equal, round, and reactive to light. Right eye exhibits no discharge. Left eye exhibits no discharge. No scleral icterus.   Neck: Normal range of motion. Neck supple. JVD present. No tracheal deviation present. No thyromegaly present.   Cardiovascular: Regular rhythm, S1 normal and S2 normal. Tachycardia present.   Pulses:       Radial pulses are 2+ on the right side, and 2+ on the left side.        Dorsalis pedis pulses are 2+ on the right side, and 2+ on the left side.   Warm extremities   Pulmonary/Chest: Accessory muscle usage (mild supraclavicular muscle use) present. Tachypnea noted. No respiratory distress. He has no decreased breath sounds. He has no wheezes. He has rales in the right middle field and the left middle field.   Abdominal: Soft. Bowel sounds are normal. He exhibits no distension. There is no tenderness. There is no guarding.   Lymphadenopathy:     He has no cervical adenopathy.   Neurological: He is alert.    Alert, moving all extremities spontaneously. Able to answer questions appropriately    Skin: Skin is dry. He is not diaphoretic. There is pallor.       Vents:  Vent Mode: BILEVL (01/30/19 0305)  Pressure Support: 7 cmH20 (01/30/19 0305)  PEEP/CPAP: 5 cmH20 (01/30/19 0305)  Oxygen Concentration (%): 50 (01/30/19 0700)  Peak Airway Pressure: 12.2 cmH2O (01/30/19 0305)  Total Ve: 20 mL (01/30/19 0305)  F/VT Ratio<105 (RSBI): (!) 30.53 (01/30/19 0305)    Lines/Drains/Airways     Peripheral Intravenous Line                 Peripheral IV - Single Lumen 01/29/19 0913 Right Hand 1 day         Peripheral IV - Single Lumen 01/30/19 0230 Right Forearm less than 1 day                Significant Labs:    CBC/Anemia Profile:  Recent Labs   Lab 01/29/19  0652 01/30/19  0630   WBC 2.12* 2.50*   HGB 10.8* 9.1*   HCT 31.9* 27.4*   PLT 12* 14*   MCV 75* 75*   RDW 14.3 14.4        Chemistries:  Recent Labs   Lab 01/29/19  0653 01/30/19  0630   * 133*   K 3.9 4.3    107   CO2 22* 22*   BUN 14 16   CREATININE 0.7 0.7   CALCIUM 7.2* 7.3*   ALBUMIN 1.2* 1.3*   PROT 4.9* 4.8*   BILITOT 0.4 0.3   ALKPHOS 130 151*   ALT 32 33   AST 97* 130*     Significant Imaging:  I have reviewed all pertinent imaging results/findings within the past 24 hours.

## 2019-01-30 NOTE — SUBJECTIVE & OBJECTIVE
Oncology Treatment Plan:   [No treatment plan]    Medications:  Continuous Infusions:  Scheduled Meds:   albuterol-ipratropium  3 mL Nebulization Q4H    cefTRIAXone (ROCEPHIN) IVPB  2 g Intravenous Q24H    famotidine  20 mg Oral BID    fluconazole  100 mg Oral Daily    methylPREDNISolone sodium succinate  60 mg Intravenous Q6H    trimethoprim-sulfamethoxasole (BACTRIM) IVPB (custom)  295 mg Intravenous Q8H     PRN Meds:acetaminophen, ondansetron     Review of patient's allergies indicates:  No Known Allergies     Past Medical History:   Diagnosis Date    Cigarette smoker     HIV (human immunodeficiency virus infection) 01/28/2019    Pneumonia 01/28/2019    Unintentional weight loss 01/28/2019    Weakness 01/28/2019     Past Surgical History:   Procedure Laterality Date    APPENDECTOMY       Family History     Problem Relation (Age of Onset)    No Known Problems Mother, Father        Tobacco Use    Smoking status: Current Every Day Smoker     Packs/day: 1.00    Smokeless tobacco: Never Used   Substance and Sexual Activity    Alcohol use: Yes    Drug use: No    Sexual activity: Yes     Partners: Female       Review of Systems   Constitutional: Positive for appetite change, diaphoresis, fatigue, fever and unexpected weight change.   HENT: Negative for mouth sores.    Eyes: Negative for visual disturbance.   Respiratory: Positive for cough and shortness of breath.    Cardiovascular: Negative for chest pain.   Gastrointestinal: Negative for abdominal pain and diarrhea.   Genitourinary: Negative for frequency.   Musculoskeletal: Negative for back pain.   Skin: Negative for rash.   Neurological: Positive for weakness. Negative for headaches.   Hematological: Negative for adenopathy.   Psychiatric/Behavioral: The patient is not nervous/anxious.      Objective:     Vital Signs (Most Recent):  Temp: 97.2 °F (36.2 °C) (01/30/19 1200)  Pulse: 107 (01/30/19 1214)  Resp: (!) 27 (01/30/19 1214)  BP: 108/61  (01/30/19 1000)  SpO2: (!) 91 % (01/30/19 1214) Vital Signs (24h Range):  Temp:  [97.2 °F (36.2 °C)-100.3 °F (37.9 °C)] 97.2 °F (36.2 °C)  Pulse:  [] 107  Resp:  [22-38] 27  SpO2:  [85 %-98 %] 91 %  BP: ()/(51-65) 108/61     Weight: 59.8 kg (131 lb 13.4 oz)  Body mass index is 21.28 kg/m².  Body surface area is 1.67 meters squared.      Intake/Output Summary (Last 24 hours) at 1/30/2019 1255  Last data filed at 1/30/2019 0800  Gross per 24 hour   Intake 3660 ml   Output 501 ml   Net 3159 ml       Physical Exam   Constitutional: He is oriented to person, place, and time. He has a sickly appearance.   HENT:   Head: Normocephalic.   Eyes: Conjunctivae are normal. No scleral icterus.   Neck: Normal range of motion. Neck supple. No thyromegaly present.   Cardiovascular: Normal rate, regular rhythm and normal heart sounds.   No murmur heard.  Pulmonary/Chest: Tachypnea noted. He has rales.   Abdominal: Soft. Bowel sounds are normal. There is no hepatosplenomegaly. There is no tenderness. There is no guarding.   Musculoskeletal: Normal range of motion. He exhibits no edema.   Neurological: He is oriented to person, place, and time. No cranial nerve deficit.   Skin: No rash noted. No erythema.   Psychiatric: He has a normal mood and affect.       Significant Labs:   All pertinent labs within the past 24 hours have been reviewed    Pathology  Pancytopenia and absolute lymphopenia without morphologic   abnormalities   -Prominent anemia with hypochromic microcytic indices, scattered     schistocytes and spherocytes, and focal hypochromasia.  The   possibility of a mixed etiology, including one are a combination   of   prior deficiency states, decreased marrow production from an   intrinsic   disorder (dysplasia), and blood loss and/or hemolysis should be   considered.      Diagnostic Results:  I have reviewed and interpreted all pertinent imaging results/findings within the past 24 hours    CTA chest   1. No  pulmonary arterial thromboembolus.  2. 1.8 cm irregular, cavitary opacity within the anterior segment of the right upper lobe, innumerable randomly distributed bilateral pulmonary nodules, patchy areas of subsegmental consolidation and nodular thickening of the right major and minor fissures.  Constellation of findings is most concerning for an infectious granulomatous process (medially TB, fungal) with less likely differential considerations including a non infectious granulomatous process (sarcoidosis) and acute hypersensitivity pneumonitis.  3. Increased number of slightly prominent mediastinal lymph nodes, possibly inflammatory or infectious.  4. Small bilateral dependent pleural effusions.      Pancytopenia and absolute lymphopenia without morphologic   abnormalities   -Prominent anemia with hypochromic microcytic indices, scattered     schistocytes and spherocytes, and focal hypochromasia.  The   possibility of a mixed etiology, including one are a combination   of   prior deficiency states, decreased marrow production from an   intrinsic   disorder (dysplasia), and blood loss and/or hemolysis should be   considered.

## 2019-01-30 NOTE — ASSESSMENT & PLAN NOTE
--suspect 2/2 PCP. Appreciate ID assistance. Continue bactrim and steroids along with vancomycin, ceftriaxone for now. Also getting fungal coverage with fluconazole.   --Dr. Powers discussed intubation and bronchoscopy with patient today using language line and patient has consented to this. Will make patient NPO and plan for intubation/bronch tomorrow.   --in meantime, continue alternating Bipap 12/5, 50% with comfort flow (start at 30 L/min and 60% FiO2); titrate to maintain saturations 88-90%  --have discontinued IVF's as patient is +about 9.5 L this admission and given one dose of lasix in interim

## 2019-01-30 NOTE — ASSESSMENT & PLAN NOTE
42 y/o [place]ian speaking male with no significant medical history presents with progressively worsening SOB, cough, weakness and 6kg weight loss. Denies hemoptysis or sputum production. He was found to be positive for HIV. We are consulted further evaluation and management.     Blood cultures NGTD  Infectious workup pending  Discussed with pulmonology, likely bronchoscopy tomorrow, will send for AFB cultures         1. Continue IV Bactrim for PJP pneumonia prophylaxis. D/c vancomycin and continue ceftriaxone empirically for CAP. Will hold off adding therapy for MAC prophylaxis (macrolides,fqs) as monotherapy for MAC can increase drug resistance.   2. HIV positive CD4 count 11   3. AFB cultures x 3 ordered(likely bronchoscopy tomorrow), TB PCR testing x 2 pending (added to sputum cultures)   4. Additional studies ordered - toxoplasma IgG, fungal studies, quantiferon gold TB pending and RPR.   5. CTA chest 1.6 cm cavitation lesion of RUL.  6. Oral fluconazole for thrush   7. ID will follow closely with you.   8. Social situation at home difficult as without permanent residence.

## 2019-01-30 NOTE — SUBJECTIVE & OBJECTIVE
Interval History:   Afebrile  Stable, non diaphoretic on examination  No coughing  Continues to be SOB  Reports not having permanent residence as travels for work. 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 Unable to provide sputum culture.     Review of Systems   Constitutional: Positive for fatigue. Negative for chills, diaphoresis and fever.   HENT:        +oral thrush   Respiratory: Positive for shortness of breath. Negative for cough.    Cardiovascular: Negative for chest pain and leg swelling.   Gastrointestinal: Negative for abdominal pain, diarrhea, nausea and rectal pain.   Genitourinary: Negative for dysuria.   Musculoskeletal: Positive for myalgias.   Neurological: Positive for weakness.   All other systems reviewed and are negative.    Objective:     Vital Signs (Most Recent):  Temp: 97.2 °F (36.2 °C) (01/30/19 0800)  Pulse: 107 (01/30/19 1214)  Resp: (!) 27 (01/30/19 1214)  BP: 108/61 (01/30/19 1000)  SpO2: (!) 91 % (01/30/19 1214) Vital Signs (24h Range):  Temp:  [97.2 °F (36.2 °C)-100.3 °F (37.9 °C)] 97.2 °F (36.2 °C)  Pulse:  [] 107  Resp:  [22-38] 27  SpO2:  [63 %-98 %] 91 %  BP: ()/(51-67) 108/61     Weight: 59.8 kg (131 lb 13.4 oz)  Body mass index is 21.28 kg/m².    Estimated Creatinine Clearance: 115.1 mL/min (based on SCr of 0.7 mg/dL).    Physical Exam   Constitutional: He is oriented to person, place, and time. No distress.   Thin, ill appearing   HENT:   Head: Normocephalic.   Oral thrush noted   Eyes: Pupils are equal, round, and reactive to light.   Cardiovascular: Regular rhythm and normal heart sounds. Exam reveals no friction rub.   No murmur heard.  Pulmonary/Chest: Effort normal. No stridor. No respiratory distress. He has no wheezes.   Abdominal: Soft. He exhibits no distension. There is no tenderness. There is no guarding.   Musculoskeletal: Normal range of motion. He exhibits no edema, tenderness or deformity.   Neurological: He is alert and oriented to  person, place, and time.   Skin: Skin is warm. He is not diaphoretic. No erythema. No pallor.   Psychiatric: He has a normal mood and affect.   Vitals reviewed.      Significant Labs:   Blood Culture:   Recent Labs   Lab 01/28/19  1023 01/28/19  1028   LABBLOO No Growth to date  No Growth to date  No Growth to date No Growth to date  No Growth to date  No Growth to date     C4 Count: No results for input(s): C4 in the last 48 hours.  CBC:   Recent Labs   Lab 01/29/19  0652 01/30/19  0630 01/30/19  1024   WBC 2.12* 2.50* 2.97*   HGB 10.8* 9.1* 9.2*   HCT 31.9* 27.4* 27.5*   PLT 12* 14* 20*     CMP:   Recent Labs   Lab 01/29/19  0653 01/30/19  0630   * 133*   K 3.9 4.3    107   CO2 22* 22*   * 136*   BUN 14 16   CREATININE 0.7 0.7   CALCIUM 7.2* 7.3*   PROT 4.9* 4.8*   ALBUMIN 1.2* 1.3*   BILITOT 0.4 0.3   ALKPHOS 130 151*   AST 97* 130*   ALT 32 33   ANIONGAP 8 4*   EGFRNONAA >60 >60     Fungus Culture (Blood or Bone Marrow): No results for input(s): FUNGUSCULTUR in the last 4320 hours.  Hepatitis Panel: No results for input(s): HEPBSAG, HEPAIGM, HEPCAB in the last 48 hours.    Invalid input(s): HAPBIGM  HIV 1/2 Antibodies: No results for input(s): [id number] in the last 48 hours.  HIV Rapid: No results for input(s): HIVRAPID in the last 48 hours.  Lactic Acid:   Recent Labs   Lab 01/30/19  0012 01/30/19  0630 01/30/19  1024   LACTATE 3.7* 3.9* 4.5*     Procalcitonin: No results for input(s): PROCAL in the last 48 hours.  Quantiferon: No results for input(s): NIL, TBAG, TBAGNIL, MITOGENNIL, TBGOLD in the last 48 hours.  Respiratory Culture: No results for input(s): GSRESP, RESPIRATORYC in the last 4320 hours.  Urine Culture: No results for input(s): LABURIN in the last 4320 hours.  Urine Studies:   Recent Labs   Lab 01/28/19  0734   COLORU Yellow   APPEARANCEUA Clear   PHUR 6.0   SPECGRAV 1.015   PROTEINUA Negative   GLUCUA Negative   KETONESU Negative   BILIRUBINUA Negative   OCCULTUA Negative    NITRITE Negative   UROBILINOGEN 2.0-3.0*   LEUKOCYTESUR Negative   SQUAMEPITHEL 1     Wound Culture: No results for input(s): LABAERO in the last 4320 hours.  All pertinent labs within the past 24 hours have been reviewed.    Significant Imaging: I have reviewed all pertinent imaging results/findings within the past 24 hours.

## 2019-01-30 NOTE — ASSESSMENT & PLAN NOTE
Patient  with worsening pancytopenia during hospitalization likely related to sepsis/infection and/or drug-induced resulting in marrow suppression/infiltration. Pt may have bm infiltration from opportunistic infections.  Extensive workup pending including workup for deficiency states and any evidence of hemolysis.     Review of peripheral smear reveals anemia with hypochromic microcytosis, scattered schistocytes and spherocytes.  Medications such as bactrim known SE of thrombocytopenia. Plan to await w/u and continue to monitor.

## 2019-01-30 NOTE — PROGRESS NOTES
0800 Placed pt on 6L via NC to maintain SpO2 88-92% while eating.  Tolerating well.  Called respiratory to evaluate appropriateness of therapy and adjust as needed.      0830 Pt placed on 15L high flow O2. Liquid stool x1.  Noted rectal bleeding when cleaning pt.  Notified MD; collected stool for possible testing.    0915  Pt heart rate consistently in 130's.  Discussed with Vandana NARVAEZ for Pulmonology; placed pt back on BiPAP.    1015 Per Vandana NP, d/c fluids, labs ordered, c diff testing and precautions initiated, e coli testing ordered.    1020  Discussed plan of care with dietary; boost to be added.    1100 Called respiratory for ABG; updated on isolation status.

## 2019-01-30 NOTE — HPI
Pt is a Sammarinese speaking 42 y/o male with no pmhx presented with generalized weakness and increasing SOB and cough x 1 week.  No hemoptysis/chest pain. Patient reports he has been losing weight.  He also reports fevers night sweats for the past week.  In the ED patient was also diagnosed with thrush.  He is HIV positive.  He was started on treatment for  PJP pneumonia. Pt in ICU for 02 requirements secondary to hypoxia.  CBC on admit 01/28/2018 reveals white blood cell count of 3.57 hemoglobin of 10.5 grams/deciliter hematocrit 30.7% and a platelet count of 75k.  This platelet count trending during hospitalization to 12 K.  Patient has undergone platelet transfusion with recent platelet counts of 20 K. No active bleeding.  Consulted for thrombocytopenia.

## 2019-01-30 NOTE — PLAN OF CARE
Problem: Adult Inpatient Plan of Care  Goal: Plan of Care Review  Recommendations     1. Encourage adequate intake of meals & oral nutr supplement daily  Goals: Maintain meal intake >50% daily  Nutrition Goal Status: new

## 2019-01-30 NOTE — CONSULTS
"  [facility]  Adult Nutrition  Consult Note    SUMMARY     Recommendations    1. Encourage adequate intake of meals & oral nutr supplement daily  Goals: Maintain meal intake >50% daily  Nutrition Goal Status: new  Communication of RD Recs: reviewed with physician(& RN- Liat)    Reason for Assessment    Reason For Assessment: consult  Diagnosis: infection/sepsis(newly dx'd HIV)  Relevant Medical History: none noted  Interdisciplinary Rounds: attended  General Information Comments: Pt admitted 2/2 weakness & fatigue w/ recent pna dx. Pt also is in isolation w/ airborn precautions 2/2 possible TB as pulm cavitary lesion found; plans for bronch for dx once respiratory status improves. MD & RN both told me I have no reason to go into pt's room at this time. I asked about pt's cachectic appearance as I can see him through his glass room door & RN provided details about pt's NFPE; see malnutrition section of note for details. Diet advanced & pt ate 100% of breakfast meal when taken off of Bipap.     Nutrition Discharge Planning: Adequate intake of meals to meet nutr needs    Nutrition Risk Screen    Nutrition Risk Screen: other (see comments)(wt loss and decreased appetite)    Nutrition/Diet History    Spiritual, Cultural Beliefs, Voodoo Practices, Values that Affect Care: no  Factors Affecting Nutritional Intake: None identified at this time    Anthropometrics    Temp: 97.2 °F (36.2 °C)  Height Method: Stated  Height: 5' 6" (167.6 cm)  Height (inches): 66 in  Weight Method: Stated  Weight: 59.8 kg (131 lb 13.4 oz)  Weight (lb): 131.84 lb  Ideal Body Weight (IBW), Male: 142 lb  % Ideal Body Weight, Male (lb): 92.85 lb  BMI (Calculated): 21.3  BMI Grade: 18.5-24.9 - normal  Usual Body Weight (UBW), kg: (Unable to obtain)       Lab/Procedures/Meds    Pertinent Labs Reviewed: reviewed  Pertinent Labs Comments: Na 133, CO2 22, Glu 136, lactate 3.9, Alb 1.3, CDR count 10  Pertinent Medications Reviewed: " reviewed  Pertinent Medications Comments: abx, famotidine, methylprednisone, lasix    Estimated/Assessed Needs    Weight Used For Calorie Calculations: 59.8 kg (131 lb 13.4 oz)  Energy Calorie Requirements (kcal): 2093-2392  Energy Need Method: Kcal/kg(35-40)     Protein Requirements: 78-90 g (1.3-1.5 g/kg)  Weight Used For Protein Calculations: 59.8 kg (131 lb 13.4 oz)     Estimated Fluid Requirement Method: RDA Method  RDA Method (mL): 2093       Nutrition Prescription Ordered    Current Diet Order: Regular    Evaluation of Received Nutrient/Fluid Intake    IV Fluid (mL): (NS @ 125 mL/hr)  I/O: +5.5 L x 24 hrs; +9.339 L since admit  Energy Calories Required: meeting needs  Protein Required: meeting needs  Fluid Required: meeting needs  Comments: LBM 1/29  Tolerance: tolerating  % Intake of Estimated Energy Needs: 75 - 100 %  % Meal Intake: 75 - 100 %    Nutrition Risk    Level of Risk/Frequency of Follow-up: (F/u 1 x weekly)     Assessment and Plan    Malnutrition of moderate degree    Malnutrition in the context of Acute Illness/Injury    Related to (etiology):  New dx of HIV    Signs and Symptoms (as evidenced by):  Body Fat Depletion: mild depletion of orbitals, triceps and thoracic and lumbar region   Muscle Mass Depletion: mild and moderate depletion of temples, clavicle region, interosseous muscle and lower extremities     Interventions/Recommendations (treatment strategy):  Commercial beverage    Nutrition Diagnosis Status:  New              Monitor and Evaluation    Food and Nutrient Intake: energy intake, food and beverage intake  Food and Nutrient Adminstration: diet order  Physical Activity and Function: nutrition-related ADLs and IADLs  Anthropometric Measurements: weight, weight change  Biochemical Data, Medical Tests and Procedures: electrolyte and renal panel, glucose/endocrine profile, inflammatory profile  Nutrition-Focused Physical Findings: overall appearance     Malnutrition  Assessment  Malnutrition Type: acute illness or injury              Orbital Region (Subcutaneous Fat Loss): mild depletion  Upper Arm Region (Subcutaneous Fat Loss): mild depletion  Thoracic and Lumbar Region: mild depletion   [place] Region (Muscle Loss): mild depletion  Clavicle Bone Region (Muscle Loss): moderate depletion  Clavicle and Acromion Bone Region (Muscle Loss): moderate depletion  Dorsal Hand (Muscle Loss): mild depletion  Patellar Region (Muscle Loss): mild depletion  Anterior Thigh Region (Muscle Loss): mild depletion  Posterior Calf Region (Muscle Loss): mild depletion       Subcutaneous Fat Loss (Final Summary): moderate protein-calorie malnutrition  Muscle Loss Evaluation (Final Summary): moderate protein-calorie malnutrition         Nutrition Follow-Up    RD Follow-up?: Yes

## 2019-01-30 NOTE — PLAN OF CARE
Problem: Adult Inpatient Plan of Care  Goal: Patient-Specific Goal (Individualization)  Outcome: Ongoing (interventions implemented as appropriate)  Pt remains in isolation. Pt has been unable to be weaned from O2 now on Bipap 50%. Pt received PLTS today x's 1 dose which he tolerated with out difficulty. Language line used and remains at bedside. Pt given information on TB and also HIV information written and in Ivorian. Pt on IVF at 125ml/hr.Pt remains on multiple ABX.

## 2019-01-30 NOTE — ASSESSMENT & PLAN NOTE
Followed by ID   CD4 count 11  Pt undergoing IV Bactrim for PJP pneumonia prophylaxis.   He is undergoing ceftriaxone empirically for CAP

## 2019-01-30 NOTE — ASSESSMENT & PLAN NOTE
Malnutrition in the context of Acute Illness/Injury    Related to (etiology):  New dx of HIV    Signs and Symptoms (as evidenced by):  Body Fat Depletion: mild depletion of orbitals, triceps and thoracic and lumbar region   Muscle Mass Depletion: mild and moderate depletion of temples, clavicle region, interosseous muscle and lower extremities     Interventions/Recommendations (treatment strategy):  Commercial beverage    Nutrition Diagnosis Status:  New

## 2019-01-30 NOTE — CONSULTS
[facility]  Hematology/Oncology  Consult Note    Patient Name: Lior Prado  MRN: [id number]  Admission Date: 1/28/2019  Hospital Length of Stay: 2 days  Code Status: Full Code   Attending Provider: Nhung Carter MD  Consulting Provider: Caor Rendon MD  Primary Care Physician: Primary Doctor No  Principal Problem:HIV disease    Inpatient consult to Hematology/Oncology - [facility]  Consult performed by: Caro Rendon MD  Consult ordered by: Nhung Carter MD        Subjective:   REASON FOR CONSULTATION: Thrombocytopenia in HIV pt  HPI:  Pt is a Bermudian speaking 44 y/o male with no pmhx presented with generalized weakness and increasing SOB and cough x 1 week.  No hemoptysis/chest pain. Patient reports he has been losing weight.  He also reports fevers night sweats for the past week.  In the ED patient was also diagnosed with thrush.  He is HIV positive.  He was started on treatment for  PJP pneumonia. Pt in ICU for 02 requirements secondary to hypoxia.  CBC on admit 01/28/2018 reveals white blood cell count of 3.57 hemoglobin of 10.5 grams/deciliter hematocrit 30.7% and a platelet count of 75k.  This platelet count trending during hospitalization to 12 K.  Patient has undergone platelet transfusion with recent platelet counts of 20 K. No active bleeding.  Consulted for thrombocytopenia.    Oncology Treatment Plan:   [No treatment plan]    Medications:  Continuous Infusions:  Scheduled Meds:   albuterol-ipratropium  3 mL Nebulization Q4H    cefTRIAXone (ROCEPHIN) IVPB  2 g Intravenous Q24H    famotidine  20 mg Oral BID    fluconazole  100 mg Oral Daily    methylPREDNISolone sodium succinate  60 mg Intravenous Q6H    trimethoprim-sulfamethoxasole (BACTRIM) IVPB (custom)  295 mg Intravenous Q8H     PRN Meds:acetaminophen, ondansetron     Review of patient's allergies indicates:  No Known Allergies     Past Medical History:   Diagnosis Date    Cigarette smoker     HIV  (human immunodeficiency virus infection) 01/28/2019    Pneumonia 01/28/2019    Unintentional weight loss 01/28/2019    Weakness 01/28/2019     Past Surgical History:   Procedure Laterality Date    APPENDECTOMY       Family History     Problem Relation (Age of Onset)    No Known Problems Mother, Father        Tobacco Use    Smoking status: Current Every Day Smoker     Packs/day: 1.00    Smokeless tobacco: Never Used   Substance and Sexual Activity    Alcohol use: Yes    Drug use: No    Sexual activity: Yes     Partners: Female       Review of Systems   Constitutional: Positive for appetite change, diaphoresis, fatigue, fever and unexpected weight change.   HENT: Negative for mouth sores.    Eyes: Negative for visual disturbance.   Respiratory: Positive for cough and shortness of breath.    Cardiovascular: Negative for chest pain.   Gastrointestinal: Negative for abdominal pain and diarrhea.   Genitourinary: Negative for frequency.   Musculoskeletal: Negative for back pain.   Skin: Negative for rash.   Neurological: Positive for weakness. Negative for headaches.   Hematological: Negative for adenopathy.   Psychiatric/Behavioral: The patient is not nervous/anxious.      Objective:     Vital Signs (Most Recent):  Temp: 97.2 °F (36.2 °C) (01/30/19 1200)  Pulse: 107 (01/30/19 1214)  Resp: (!) 27 (01/30/19 1214)  BP: 108/61 (01/30/19 1000)  SpO2: (!) 91 % (01/30/19 1214) Vital Signs (24h Range):  Temp:  [97.2 °F (36.2 °C)-100.3 °F (37.9 °C)] 97.2 °F (36.2 °C)  Pulse:  [] 107  Resp:  [22-38] 27  SpO2:  [85 %-98 %] 91 %  BP: ()/(51-65) 108/61     Weight: 59.8 kg (131 lb 13.4 oz)  Body mass index is 21.28 kg/m².  Body surface area is 1.67 meters squared.      Intake/Output Summary (Last 24 hours) at 1/30/2019 1255  Last data filed at 1/30/2019 0800  Gross per 24 hour   Intake 3660 ml   Output 501 ml   Net 3159 ml       Physical Exam   Constitutional: He is oriented to person, place, and time. He has a  sickly appearance.   HENT:   Head: Normocephalic.   Eyes: Conjunctivae are normal. No scleral icterus.   Neck: Normal range of motion. Neck supple. No thyromegaly present.   Cardiovascular: Normal rate, regular rhythm and normal heart sounds.   No murmur heard.  Pulmonary/Chest: Tachypnea noted. He has rales.   Abdominal: Soft. Bowel sounds are normal. There is no hepatosplenomegaly. There is no tenderness. There is no guarding.   Musculoskeletal: Normal range of motion. He exhibits no edema.   Neurological: He is oriented to person, place, and time. No cranial nerve deficit.   Skin: No rash noted. No erythema.   Psychiatric: He has a normal mood and affect.       Significant Labs:   All pertinent labs within the past 24 hours have been reviewed    Pathology  Pancytopenia and absolute lymphopenia without morphologic   abnormalities   -Prominent anemia with hypochromic microcytic indices, scattered     schistocytes and spherocytes, and focal hypochromasia.  The   possibility of a mixed etiology, including one are a combination   of   prior deficiency states, decreased marrow production from an   intrinsic   disorder (dysplasia), and blood loss and/or hemolysis should be   considered.      Diagnostic Results:  I have reviewed and interpreted all pertinent imaging results/findings within the past 24 hours    CTA chest   1. No pulmonary arterial thromboembolus.  2. 1.8 cm irregular, cavitary opacity within the anterior segment of the right upper lobe, innumerable randomly distributed bilateral pulmonary nodules, patchy areas of subsegmental consolidation and nodular thickening of the right major and minor fissures.  Constellation of findings is most concerning for an infectious granulomatous process (medially TB, fungal) with less likely differential considerations including a non infectious granulomatous process (sarcoidosis) and acute hypersensitivity pneumonitis.  3. Increased number of slightly prominent mediastinal  lymph nodes, possibly inflammatory or infectious.  4. Small bilateral dependent pleural effusions.      Pancytopenia and absolute lymphopenia without morphologic   abnormalities   -Prominent anemia with hypochromic microcytic indices, scattered     schistocytes and spherocytes, and focal hypochromasia.  The   possibility of a mixed etiology, including one are a combination   of   prior deficiency states, decreased marrow production from an   intrinsic   disorder (dysplasia), and blood loss and/or hemolysis should be   considered.          Assessment/Plan:     * HIV disease    Followed by ID   CD4 count 11  Pt undergoing IV Bactrim for PJP pneumonia prophylaxis.   He is undergoing ceftriaxone empirically for CAP     Acute hypoxemic respiratory failure    Pulm following  Pt treated with  antibiotics including bactrim and  steroids for suspected PJP and PNA  concerns for TB  CTA shows1.6 cm cavitation lesion of RUL  FOB  planned in am     Pancytopenia    Patient  with worsening pancytopenia during hospitalization likely related to sepsis/infection and/or drug-induced resulting in marrow suppression/infiltration. Pt may have bm infiltration from opportunistic infections.  Extensive workup pending including workup for deficiency states and any evidence of hemolysis.     Review of peripheral smear reveals anemia with hypochromic microcytosis, scattered schistocytes and spherocytes.  Medications such as bactrim known SE of thrombocytopenia. Plan to await w/u and continue to monitor.          Thank you for your consult.    Caro Rendon MD  Hematology/Oncology  [facility] [place]

## 2019-01-30 NOTE — PLAN OF CARE
Problem: Adult Inpatient Plan of Care  Goal: Plan of Care Review  Outcome: Ongoing (interventions implemented as appropriate)  Pt remains in ICU on isolation. Pt on BiPAP 50%. ABX given as ordered. NS @ 125 ml/ hr. Pt urinates without difficulty. No BM during this shift. Pt remains free of falls, injuries, or skin break down during this shift.

## 2019-01-30 NOTE — ASSESSMENT & PLAN NOTE
--appreciate Heme/Onc assistance.   -?bone marrow suppression from acute infection vs HIV vs medication induced (bactrim, famotidine for thrombocytopenia...however, patient was thrombocytopenic prior to receiving these medications)  --DIC, hemolytic labs obtained and pending  --no signs of bleeding at this time  --would only transfuse if Hgb <7, platelets <10k or platelets <50K with active bleeeing

## 2019-01-30 NOTE — PROGRESS NOTES
[facility]  Infectious Disease  Progress Note    Patient Name: Lior Prado  MRN: [id number]  Admission Date: 1/28/2019  Length of Stay: 2 days  Attending Physician: Nhung Carter MD  Primary Care Provider: Primary Doctor No    Isolation Status: Special Contact  Assessment/Plan:      * HIV disease    44 y/o Monegasque speaking male with no significant medical history presents with progressively worsening SOB, cough, weakness and 6kg weight loss. Denies hemoptysis or sputum production. He was found to be positive for HIV. We are consulted further evaluation and management.     Blood cultures NGTD  Infectious workup pending  Discussed with pulmonology, likely bronchoscopy tomorrow, will send for AFB cultures         1. Continue IV Bactrim for PJP pneumonia prophylaxis. D/c vancomycin and continue ceftriaxone empirically for CAP. Will hold off adding therapy for MAC prophylaxis (macrolides,fqs) as monotherapy for MAC can increase drug resistance.   2. HIV positive CD4 count 11   3. AFB cultures x 3 ordered(likely bronchoscopy tomorrow), TB PCR testing x 2 pending (added to sputum cultures)   4. Additional studies ordered - toxoplasma IgG, fungal studies, quantiferon gold TB pending and RPR.   5. CTA chest 1.6 cm cavitation lesion of RUL.  6. Oral fluconazole for thrush   7. ID will follow closely with you.   8. Social situation at home difficult as without permanent residence.         Thank you for your consult. I will follow-up with patient. Please contact us if you have any additional questions.    Dania Rogers PA-C  Infectious Disease  [facility]  Pgr [phone]    Subjective:     Principal Problem:HIV disease    HPI: 44 y/o Monegasque speaking male with no significant medical history presents with progressively worsening SOB and weakness for the past week. Associated symptoms include dizziness. He reports having a cough (mostly at nights) for the past week. Denies  hemoptysis or sputum production. He denies having any fever chills or night sweats at home. No n/v/d,dysuria. He reports having dyspnea on exertion and 6kg weight loss. On admission he was found to be febrile and significantly diaphoretic. His HIV rapid test is positive. Concerns for PJP pneumonia. He is thin and ill appearing.    He works as a . He denies incarceration or homelessness. He denies knowing about HIV diagnosis. He smokes, denies IVDU or illicit drug use. He denies exposure to TB. No significant allergies at this time.     Blood cultures NGTD  He is febrile 101 and tachycardic  Interval History:   Afebrile  Stable, non diaphoretic on examination  No coughing  Continues to be SOB  Reports not having permanent residence as travels for work. 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 Unable to provide sputum culture.     Review of Systems   Constitutional: Positive for fatigue. Negative for chills, diaphoresis and fever.   HENT:        +oral thrush   Respiratory: Positive for shortness of breath. Negative for cough.    Cardiovascular: Negative for chest pain and leg swelling.   Gastrointestinal: Negative for abdominal pain, diarrhea, nausea and rectal pain.   Genitourinary: Negative for dysuria.   Musculoskeletal: Positive for myalgias.   Neurological: Positive for weakness.   All other systems reviewed and are negative.    Objective:     Vital Signs (Most Recent):  Temp: 97.2 °F (36.2 °C) (01/30/19 0800)  Pulse: 107 (01/30/19 1214)  Resp: (!) 27 (01/30/19 1214)  BP: 108/61 (01/30/19 1000)  SpO2: (!) 91 % (01/30/19 1214) Vital Signs (24h Range):  Temp:  [97.2 °F (36.2 °C)-100.3 °F (37.9 °C)] 97.2 °F (36.2 °C)  Pulse:  [] 107  Resp:  [22-38] 27  SpO2:  [63 %-98 %] 91 %  BP: ()/(51-67) 108/61     Weight: 59.8 kg (131 lb 13.4 oz)  Body mass index is 21.28 kg/m².    Estimated Creatinine Clearance: 115.1 mL/min (based on SCr of 0.7 mg/dL).    Physical Exam    Constitutional: He is oriented to person, place, and time. No distress.   Thin, ill appearing   HENT:   Head: Normocephalic.   Oral thrush noted   Eyes: Pupils are equal, round, and reactive to light.   Cardiovascular: Regular rhythm and normal heart sounds. Exam reveals no friction rub.   No murmur heard.  Pulmonary/Chest: Effort normal. No stridor. No respiratory distress. He has no wheezes.   Abdominal: Soft. He exhibits no distension. There is no tenderness. There is no guarding.   Musculoskeletal: Normal range of motion. He exhibits no edema, tenderness or deformity.   Neurological: He is alert and oriented to person, place, and time.   Skin: Skin is warm. He is not diaphoretic. No erythema. No pallor.   Psychiatric: He has a normal mood and affect.   Vitals reviewed.      Significant Labs:   Blood Culture:   Recent Labs   Lab 01/28/19  1023 01/28/19  1028   LABBLOO No Growth to date  No Growth to date  No Growth to date No Growth to date  No Growth to date  No Growth to date     C4 Count: No results for input(s): C4 in the last 48 hours.  CBC:   Recent Labs   Lab 01/29/19  0652 01/30/19  0630 01/30/19  1024   WBC 2.12* 2.50* 2.97*   HGB 10.8* 9.1* 9.2*   HCT 31.9* 27.4* 27.5*   PLT 12* 14* 20*     CMP:   Recent Labs   Lab 01/29/19  0653 01/30/19  0630   * 133*   K 3.9 4.3    107   CO2 22* 22*   * 136*   BUN 14 16   CREATININE 0.7 0.7   CALCIUM 7.2* 7.3*   PROT 4.9* 4.8*   ALBUMIN 1.2* 1.3*   BILITOT 0.4 0.3   ALKPHOS 130 151*   AST 97* 130*   ALT 32 33   ANIONGAP 8 4*   EGFRNONAA >60 >60     Fungus Culture (Blood or Bone Marrow): No results for input(s): FUNGUSCULTUR in the last 4320 hours.  Hepatitis Panel: No results for input(s): HEPBSAG, HEPAIGM, HEPCAB in the last 48 hours.    Invalid input(s): HAPBIGM  HIV 1/2 Antibodies: No results for input(s): [id number] in the last 48 hours.  HIV Rapid: No results for input(s): HIVRAPID in the last 48 hours.  Lactic Acid:   Recent Labs   Lab  01/30/19  0012 01/30/19  0630 01/30/19  1024   LACTATE 3.7* 3.9* 4.5*     Procalcitonin: No results for input(s): PROCAL in the last 48 hours.  Quantiferon: No results for input(s): NIL, TBAG, TBAGNIL, MITOGENNIL, TBGOLD in the last 48 hours.  Respiratory Culture: No results for input(s): GSRESP, RESPIRATORYC in the last 4320 hours.  Urine Culture: No results for input(s): LABURIN in the last 4320 hours.  Urine Studies:   Recent Labs   Lab 01/28/19  0734   COLORU Yellow   APPEARANCEUA Clear   PHUR 6.0   SPECGRAV 1.015   PROTEINUA Negative   GLUCUA Negative   KETONESU Negative   BILIRUBINUA Negative   OCCULTUA Negative   NITRITE Negative   UROBILINOGEN 2.0-3.0*   LEUKOCYTESUR Negative   SQUAMEPITHEL 1     Wound Culture: No results for input(s): LABAERO in the last 4320 hours.  All pertinent labs within the past 24 hours have been reviewed.    Significant Imaging: I have reviewed all pertinent imaging results/findings within the past 24 hours.

## 2019-01-30 NOTE — PROGRESS NOTES
[facility]  Pulmonology  Progress Note    Patient Name: Lior Prado  MRN: [id number]  Admission Date: 1/28/2019  Hospital Length of Stay: 2 days  Code Status: Full Code  Attending Provider: Nhung Carter MD  Primary Care Provider: Primary Doctor No   Principal Problem: HIV disease    Subjective:     Interval History: more hypoxemic this AM, unable to tolerate being off of NIPPV. Denies pain, complains of intermittent palpitations when feeling short of breath.     Objective:     Vital Signs (Most Recent):  Temp: 97.2 °F (36.2 °C) (01/30/19 0800)  Pulse: (!) 132 (01/30/19 0900)  Resp: (!) 26 (01/30/19 0900)  BP: (!) 108/56 (01/30/19 0900)  SpO2: (!) 91 % (01/30/19 0900) Vital Signs (24h Range):  Temp:  [97.2 °F (36.2 °C)-100.3 °F (37.9 °C)] 97.2 °F (36.2 °C)  Pulse:  [] 132  Resp:  [22-38] 26  SpO2:  [63 %-98 %] 91 %  BP: ()/(51-69) 108/56     Weight: 59.8 kg (131 lb 13.4 oz)  Body mass index is 21.28 kg/m².      Intake/Output Summary (Last 24 hours) at 1/30/2019 1000  Last data filed at 1/30/2019 0800  Gross per 24 hour   Intake 4777 ml   Output 701 ml   Net 4076 ml       Physical Exam   Constitutional: He appears cachectic. He has a sickly appearance. He appears ill. No distress.   HENT:   Head: Normocephalic and atraumatic.   Eyes: Conjunctivae are normal. Pupils are equal, round, and reactive to light. Right eye exhibits no discharge. Left eye exhibits no discharge. No scleral icterus.   Neck: Normal range of motion. Neck supple. JVD present. No tracheal deviation present. No thyromegaly present.   Cardiovascular: Regular rhythm, S1 normal and S2 normal. Tachycardia present.   Pulses:       Radial pulses are 2+ on the right side, and 2+ on the left side.        Dorsalis pedis pulses are 2+ on the right side, and 2+ on the left side.   Warm extremities   Pulmonary/Chest: Accessory muscle usage (mild supraclavicular muscle use) present. Tachypnea noted. No respiratory  distress. He has no decreased breath sounds. He has no wheezes. He has rales in the right middle field and the left middle field.   Abdominal: Soft. Bowel sounds are normal. He exhibits no distension. There is no tenderness. There is no guarding.   Lymphadenopathy:     He has no cervical adenopathy.   Neurological: He is alert.   Alert, moving all extremities spontaneously. Able to answer questions appropriately    Skin: Skin is dry. He is not diaphoretic. There is pallor.       Vents:  Vent Mode: BILEVL (01/30/19 0305)  Pressure Support: 7 cmH20 (01/30/19 0305)  PEEP/CPAP: 5 cmH20 (01/30/19 0305)  Oxygen Concentration (%): 50 (01/30/19 0700)  Peak Airway Pressure: 12.2 cmH2O (01/30/19 0305)  Total Ve: 20 mL (01/30/19 0305)  F/VT Ratio<105 (RSBI): (!) 30.53 (01/30/19 0305)    Lines/Drains/Airways     Peripheral Intravenous Line                 Peripheral IV - Single Lumen 01/29/19 0913 Right Hand 1 day         Peripheral IV - Single Lumen 01/30/19 0230 Right Forearm less than 1 day                Significant Labs:    CBC/Anemia Profile:  Recent Labs   Lab 01/29/19  0652 01/30/19  0630   WBC 2.12* 2.50*   HGB 10.8* 9.1*   HCT 31.9* 27.4*   PLT 12* 14*   MCV 75* 75*   RDW 14.3 14.4        Chemistries:  Recent Labs   Lab 01/29/19  0653 01/30/19  0630   * 133*   K 3.9 4.3    107   CO2 22* 22*   BUN 14 16   CREATININE 0.7 0.7   CALCIUM 7.2* 7.3*   ALBUMIN 1.2* 1.3*   PROT 4.9* 4.8*   BILITOT 0.4 0.3   ALKPHOS 130 151*   ALT 32 33   AST 97* 130*     Significant Imaging:  I have reviewed all pertinent imaging results/findings within the past 24 hours.    Assessment/Plan:     Acute hypoxemic respiratory failure    --suspect 2/2 PCP. Appreciate ID assistance. Continue bactrim and steroids along with vancomycin, ceftriaxone for now. Also getting fungal coverage with fluconazole.   --Dr. Powers discussed intubation and bronchoscopy with patient today using language line and patient has consented to this. Will make  patient NPO and plan for intubation/bronch tomorrow.   --in meantime, continue alternating Bipap 12/5, 50% with comfort flow (start at 30 L/min and 60% FiO2); titrate to maintain saturations 88-90%  --have discontinued IVF's as patient is +about 9.5 L this admission and given one dose of lasix in interim      Pancytopenia    --appreciate Heme/Onc assistance.   -?bone marrow suppression from acute infection vs HIV vs medication induced (bactrim, famotidine for thrombocytopenia...however, patient was thrombocytopenic prior to receiving these medications)  --DIC, hemolytic labs obtained and pending  --no signs of bleeding at this time  --would only transfuse if Hgb <7, platelets <10k or platelets <50K with active bleeeing         Above discussed with Dr. Powers.    Critical care time: 60 minutes       Chuyita Hensley, BRICE  Pulmonology  [facility]

## 2019-01-31 ENCOUNTER — ANESTHESIA (OUTPATIENT)
Dept: ENDOSCOPY | Facility: HOSPITAL | Age: 44
DRG: 974 | End: 2019-01-31
Payer: MEDICAID

## 2019-01-31 ENCOUNTER — ANESTHESIA EVENT (OUTPATIENT)
Dept: ENDOSCOPY | Facility: HOSPITAL | Age: 44
DRG: 974 | End: 2019-01-31
Payer: MEDICAID

## 2019-01-31 LAB
ALBUMIN SERPL BCP-MCNC: 1.3 G/DL
ALLENS TEST: ABNORMAL
ALP SERPL-CCNC: 320 U/L
ALT SERPL W/O P-5'-P-CCNC: 148 U/L
ANION GAP SERPL CALC-SCNC: 5 MMOL/L
ANISOCYTOSIS BLD QL SMEAR: SLIGHT
ANISOCYTOSIS BLD QL SMEAR: SLIGHT
APTT BLDCRRT: 29.6 SEC
AST SERPL-CCNC: 373 U/L
BASOPHILS # BLD AUTO: 0.01 K/UL
BASOPHILS # BLD AUTO: ABNORMAL K/UL
BASOPHILS NFR BLD: 0 %
BASOPHILS NFR BLD: 0.3 %
BILIRUB SERPL-MCNC: 0.3 MG/DL
BUN SERPL-MCNC: 16 MG/DL
CALCIUM SERPL-MCNC: 7.4 MG/DL
CHLORIDE SERPL-SCNC: 102 MMOL/L
CO2 SERPL-SCNC: 25 MMOL/L
CREAT SERPL-MCNC: 0.6 MG/DL
DELSYS: ABNORMAL
DIFFERENTIAL METHOD: ABNORMAL
DIFFERENTIAL METHOD: ABNORMAL
DOHLE BOD BLD QL SMEAR: PRESENT
EOSINOPHIL # BLD AUTO: 0 K/UL
EOSINOPHIL # BLD AUTO: ABNORMAL K/UL
EOSINOPHIL NFR BLD: 0 %
EOSINOPHIL NFR BLD: 0 %
ERYTHROCYTE [DISTWIDTH] IN BLOOD BY AUTOMATED COUNT: 14.5 %
ERYTHROCYTE [DISTWIDTH] IN BLOOD BY AUTOMATED COUNT: 14.7 %
ERYTHROCYTE [SEDIMENTATION RATE] IN BLOOD BY WESTERGREN METHOD: 15 MM/H
EST. GFR  (AFRICAN AMERICAN): >60 ML/MIN/1.73 M^2
EST. GFR  (NON AFRICAN AMERICAN): >60 ML/MIN/1.73 M^2
FIBRINOGEN PPP-MCNC: 122 MG/DL
FIO2: 60
GIANT PLATELETS BLD QL SMEAR: PRESENT
GLUCOSE SERPL-MCNC: 142 MG/DL
HCO3 UR-SCNC: 22.7 MMOL/L (ref 24–28)
HCT VFR BLD AUTO: 24.7 %
HCT VFR BLD AUTO: 27.5 %
HGB BLD-MCNC: 8.2 G/DL
HGB BLD-MCNC: 9.2 G/DL
HYPOCHROMIA BLD QL SMEAR: ABNORMAL
HYPOCHROMIA BLD QL SMEAR: ABNORMAL
INR PPP: 1.2
KOH PREP SPEC: NORMAL
LACTATE SERPL-SCNC: 2 MMOL/L
LACTATE SERPL-SCNC: 3 MMOL/L
LACTATE SERPL-SCNC: 3.1 MMOL/L
LACTATE SERPL-SCNC: 3.9 MMOL/L
LYMPHOCYTES # BLD AUTO: 0.2 K/UL
LYMPHOCYTES # BLD AUTO: ABNORMAL K/UL
LYMPHOCYTES NFR BLD: 4 %
LYMPHOCYTES NFR BLD: 5.1 %
MCH RBC QN AUTO: 24.5 PG
MCH RBC QN AUTO: 24.8 PG
MCHC RBC AUTO-ENTMCNC: 33.2 G/DL
MCHC RBC AUTO-ENTMCNC: 33.5 G/DL
MCV RBC AUTO: 74 FL
MCV RBC AUTO: 74 FL
METAMYELOCYTES NFR BLD MANUAL: 4 %
MIN VOL: 8.3
MODE: ABNORMAL
MONOCYTES # BLD AUTO: 0 K/UL
MONOCYTES # BLD AUTO: ABNORMAL K/UL
MONOCYTES NFR BLD: 0 %
MONOCYTES NFR BLD: 0.3 %
NEUTROPHILS # BLD AUTO: 2.8 K/UL
NEUTROPHILS NFR BLD: 68 %
NEUTROPHILS NFR BLD: 94.6 %
NEUTS BAND NFR BLD MANUAL: 24 %
O+P STL TRI STN: NORMAL
OVALOCYTES BLD QL SMEAR: ABNORMAL
OVALOCYTES BLD QL SMEAR: ABNORMAL
PATH REV BLD -IMP: NORMAL
PCO2 BLDA: 34.4 MMHG (ref 35–45)
PEEP: 5
PH SMN: 7.43 [PH] (ref 7.35–7.45)
PIP: 11
PLATELET # BLD AUTO: 20 K/UL
PLATELET # BLD AUTO: 22 K/UL
PLATELET BLD QL SMEAR: ABNORMAL
PLATELET BLD QL SMEAR: ABNORMAL
PMV BLD AUTO: ABNORMAL FL
PMV BLD AUTO: ABNORMAL FL
PO2 BLDA: 129 MMHG (ref 80–100)
POC BE: -1 MMOL/L
POC SATURATED O2: 99 % (ref 95–100)
POC TCO2: 24 MMOL/L (ref 23–27)
POIKILOCYTOSIS BLD QL SMEAR: SLIGHT
POIKILOCYTOSIS BLD QL SMEAR: SLIGHT
POTASSIUM SERPL-SCNC: 4.1 MMOL/L
PROT SERPL-MCNC: 4.8 G/DL
PROTHROMBIN TIME: 12.4 SEC
RBC # BLD AUTO: 3.35 M/UL
RBC # BLD AUTO: 3.71 M/UL
SAMPLE: ABNORMAL
SITE: ABNORMAL
SODIUM SERPL-SCNC: 132 MMOL/L
SP02: 96
TOXIC GRANULES BLD QL SMEAR: PRESENT
TOXIC GRANULES BLD QL SMEAR: PRESENT
VT: 430
WBC # BLD AUTO: 2.62 K/UL
WBC # BLD AUTO: 2.97 K/UL
WBC TOXIC VACUOLES BLD QL SMEAR: PRESENT

## 2019-01-31 PROCEDURE — 27000190 HC CPAP FULL FACE MASK W/VALVE

## 2019-01-31 PROCEDURE — 36415 COLL VENOUS BLD VENIPUNCTURE: CPT

## 2019-01-31 PROCEDURE — 99291 CRITICAL CARE FIRST HOUR: CPT | Mod: 25,,, | Performed by: NURSE PRACTITIONER

## 2019-01-31 PROCEDURE — 63600175 PHARM REV CODE 636 W HCPCS: Performed by: INTERNAL MEDICINE

## 2019-01-31 PROCEDURE — 85730 THROMBOPLASTIN TIME PARTIAL: CPT

## 2019-01-31 PROCEDURE — 25000242 PHARM REV CODE 250 ALT 637 W/ HCPCS: Performed by: NURSE PRACTITIONER

## 2019-01-31 PROCEDURE — 94640 AIRWAY INHALATION TREATMENT: CPT

## 2019-01-31 PROCEDURE — 83605 ASSAY OF LACTIC ACID: CPT

## 2019-01-31 PROCEDURE — 94002 VENT MGMT INPAT INIT DAY: CPT

## 2019-01-31 PROCEDURE — 87205 SMEAR GRAM STAIN: CPT

## 2019-01-31 PROCEDURE — 27100171 HC OXYGEN HIGH FLOW UP TO 24 HOURS

## 2019-01-31 PROCEDURE — 88112 CYTOPATH CELL ENHANCE TECH: CPT | Mod: 26,,, | Performed by: PATHOLOGY

## 2019-01-31 PROCEDURE — 36600 WITHDRAWAL OF ARTERIAL BLOOD: CPT

## 2019-01-31 PROCEDURE — 63600175 PHARM REV CODE 636 W HCPCS: Performed by: REGISTERED NURSE

## 2019-01-31 PROCEDURE — 87116 MYCOBACTERIA CULTURE: CPT | Mod: 59

## 2019-01-31 PROCEDURE — 99233 PR SUBSEQUENT HOSPITAL CARE,LEVL III: ICD-10-PCS | Mod: ,,, | Performed by: PHYSICIAN ASSISTANT

## 2019-01-31 PROCEDURE — 85598 HEXAGNAL PHOSPH PLTLT NEUTRL: CPT

## 2019-01-31 PROCEDURE — 25000003 PHARM REV CODE 250: Performed by: EMERGENCY MEDICINE

## 2019-01-31 PROCEDURE — 31624 DX BRONCHOSCOPE/LAVAGE: CPT | Mod: RT,,, | Performed by: INTERNAL MEDICINE

## 2019-01-31 PROCEDURE — 87015 SPECIMEN INFECT AGNT CONCNTJ: CPT

## 2019-01-31 PROCEDURE — 31624 PR BRONCHOSCOPY,DIAG2STIC W LAVAGE: ICD-10-PCS | Mod: RT,,, | Performed by: INTERNAL MEDICINE

## 2019-01-31 PROCEDURE — 87632 RESP VIRUS 6-11 TARGETS: CPT

## 2019-01-31 PROCEDURE — 25000003 PHARM REV CODE 250: Performed by: HOSPITALIST

## 2019-01-31 PROCEDURE — 87070 CULTURE OTHR SPECIMN AEROBIC: CPT

## 2019-01-31 PROCEDURE — 99233 PR SUBSEQUENT HOSPITAL CARE,LEVL III: ICD-10-PCS | Mod: ,,, | Performed by: INTERNAL MEDICINE

## 2019-01-31 PROCEDURE — 99233 SBSQ HOSP IP/OBS HIGH 50: CPT | Mod: ,,, | Performed by: INTERNAL MEDICINE

## 2019-01-31 PROCEDURE — 82803 BLOOD GASES ANY COMBINATION: CPT

## 2019-01-31 PROCEDURE — 99233 SBSQ HOSP IP/OBS HIGH 50: CPT | Mod: ,,, | Performed by: PHYSICIAN ASSISTANT

## 2019-01-31 PROCEDURE — S0039 INJECTION, SULFAMETHOXAZOLE: HCPCS | Performed by: INTERNAL MEDICINE

## 2019-01-31 PROCEDURE — 87102 FUNGUS ISOLATION CULTURE: CPT

## 2019-01-31 PROCEDURE — 31622 DX BRONCHOSCOPE/WASH: CPT | Performed by: INTERNAL MEDICINE

## 2019-01-31 PROCEDURE — 85613 RUSSELL VIPER VENOM DILUTED: CPT

## 2019-01-31 PROCEDURE — 88305 TISSUE EXAM BY PATHOLOGIST: CPT | Mod: 26,,, | Performed by: PATHOLOGY

## 2019-01-31 PROCEDURE — 88305 TISSUE EXAM BY PATHOLOGIST: CPT | Performed by: PATHOLOGY

## 2019-01-31 PROCEDURE — 80053 COMPREHEN METABOLIC PANEL: CPT

## 2019-01-31 PROCEDURE — 87107 FUNGI IDENTIFICATION MOLD: CPT

## 2019-01-31 PROCEDURE — 99900026 HC AIRWAY MAINTENANCE (STAT)

## 2019-01-31 PROCEDURE — 88305 CYTOLOGY SPECIMEN- MEDICAL CYTOLOGY (FLUID/WASH/BRUSH): ICD-10-PCS | Mod: 26,,, | Performed by: PATHOLOGY

## 2019-01-31 PROCEDURE — 25000003 PHARM REV CODE 250: Performed by: INTERNAL MEDICINE

## 2019-01-31 PROCEDURE — 25000003 PHARM REV CODE 250: Performed by: NURSE PRACTITIONER

## 2019-01-31 PROCEDURE — 99291 PR CRITICAL CARE, E/M 30-74 MINUTES: ICD-10-PCS | Mod: 25,,, | Performed by: NURSE PRACTITIONER

## 2019-01-31 PROCEDURE — 88112 CYTOLOGY SPECIMEN- MEDICAL CYTOLOGY (FLUID/WASH/BRUSH): ICD-10-PCS | Mod: 26,,, | Performed by: PATHOLOGY

## 2019-01-31 PROCEDURE — 99900035 HC TECH TIME PER 15 MIN (STAT)

## 2019-01-31 PROCEDURE — 87206 SMEAR FLUORESCENT/ACID STAI: CPT | Mod: 91

## 2019-01-31 PROCEDURE — 85610 PROTHROMBIN TIME: CPT

## 2019-01-31 PROCEDURE — 87116 MYCOBACTERIA CULTURE: CPT

## 2019-01-31 PROCEDURE — 87107 FUNGI IDENTIFICATION MOLD: CPT | Mod: 59

## 2019-01-31 PROCEDURE — 82668 ASSAY OF ERYTHROPOIETIN: CPT

## 2019-01-31 PROCEDURE — 27100092 HC HIGH FLOW DELIVERY CANNULA

## 2019-01-31 PROCEDURE — 87556 M.TUBERCULO DNA AMP PROBE: CPT

## 2019-01-31 PROCEDURE — 85384 FIBRINOGEN ACTIVITY: CPT

## 2019-01-31 PROCEDURE — 88312 SPECIAL STAINS GROUP 1: CPT | Mod: 26,,, | Performed by: PATHOLOGY

## 2019-01-31 PROCEDURE — 20000000 HC ICU ROOM

## 2019-01-31 PROCEDURE — 94761 N-INVAS EAR/PLS OXIMETRY MLT: CPT

## 2019-01-31 PROCEDURE — 27200966 HC CLOSED SUCTION SYSTEM

## 2019-01-31 PROCEDURE — 94660 CPAP INITIATION&MGMT: CPT

## 2019-01-31 PROCEDURE — 85007 BL SMEAR W/DIFF WBC COUNT: CPT

## 2019-01-31 PROCEDURE — 25000003 PHARM REV CODE 250: Performed by: REGISTERED NURSE

## 2019-01-31 PROCEDURE — 87206 SMEAR FLUORESCENT/ACID STAI: CPT

## 2019-01-31 PROCEDURE — 83605 ASSAY OF LACTIC ACID: CPT | Mod: 91

## 2019-01-31 PROCEDURE — 85027 COMPLETE CBC AUTOMATED: CPT

## 2019-01-31 PROCEDURE — 87210 SMEAR WET MOUNT SALINE/INK: CPT

## 2019-01-31 PROCEDURE — 86592 SYPHILIS TEST NON-TREP QUAL: CPT

## 2019-01-31 PROCEDURE — 88312 CYTOLOGY SPECIMEN- MEDICAL CYTOLOGY (FLUID/WASH/BRUSH): ICD-10-PCS | Mod: 26,,, | Performed by: PATHOLOGY

## 2019-01-31 RX ORDER — ETOMIDATE 2 MG/ML
INJECTION INTRAVENOUS
Status: SHIPPED | OUTPATIENT
Start: 2019-01-31

## 2019-01-31 RX ORDER — FENTANYL CITRATE 50 UG/ML
50 INJECTION, SOLUTION INTRAMUSCULAR; INTRAVENOUS ONCE
Status: COMPLETED | OUTPATIENT
Start: 2019-01-31 | End: 2019-01-31

## 2019-01-31 RX ORDER — PROPOFOL 10 MG/ML
INJECTION, EMULSION INTRAVENOUS
Status: COMPLETED
Start: 2019-01-31 | End: 2019-01-31

## 2019-01-31 RX ORDER — SUCCINYLCHOLINE CHLORIDE 20 MG/ML
INJECTION INTRAMUSCULAR; INTRAVENOUS
Status: SHIPPED | OUTPATIENT
Start: 2019-01-31

## 2019-01-31 RX ORDER — FLUCONAZOLE 100 MG/1
400 TABLET ORAL DAILY
Status: DISCONTINUED | OUTPATIENT
Start: 2019-02-01 | End: 2019-02-01

## 2019-01-31 RX ORDER — LIDOCAINE HCL/PF 100 MG/5ML
SYRINGE (ML) INTRAVENOUS
Status: SHIPPED | OUTPATIENT
Start: 2019-01-31

## 2019-01-31 RX ORDER — FENTANYL CITRATE-0.9 % NACL/PF 10 MCG/ML
25 PLASTIC BAG, INJECTION (ML) INTRAVENOUS CONTINUOUS
Status: DISCONTINUED | OUTPATIENT
Start: 2019-01-31 | End: 2019-02-03

## 2019-01-31 RX ORDER — LIDOCAINE HYDROCHLORIDE 40 MG/ML
SOLUTION TOPICAL
Status: DISPENSED
Start: 2019-01-31 | End: 2019-01-31

## 2019-01-31 RX ORDER — PROPOFOL 10 MG/ML
5 INJECTION, EMULSION INTRAVENOUS CONTINUOUS
Status: DISCONTINUED | OUTPATIENT
Start: 2019-01-31 | End: 2019-02-03

## 2019-01-31 RX ADMIN — IPRATROPIUM BROMIDE AND ALBUTEROL SULFATE 3 ML: .5; 3 SOLUTION RESPIRATORY (INHALATION) at 11:01

## 2019-01-31 RX ADMIN — FAMOTIDINE 20 MG: 20 TABLET ORAL at 08:01

## 2019-01-31 RX ADMIN — LIDOCAINE HYDROCHLORIDE 100 MG: 20 INJECTION, SOLUTION INTRAVENOUS at 09:01

## 2019-01-31 RX ADMIN — IPRATROPIUM BROMIDE AND ALBUTEROL SULFATE 3 ML: .5; 3 SOLUTION RESPIRATORY (INHALATION) at 03:01

## 2019-01-31 RX ADMIN — METHYLPREDNISOLONE SODIUM SUCCINATE 60 MG: 125 INJECTION, POWDER, FOR SOLUTION INTRAMUSCULAR; INTRAVENOUS at 11:01

## 2019-01-31 RX ADMIN — IPRATROPIUM BROMIDE AND ALBUTEROL SULFATE 3 ML: .5; 3 SOLUTION RESPIRATORY (INHALATION) at 07:01

## 2019-01-31 RX ADMIN — Medication 25 MCG/HR: at 11:01

## 2019-01-31 RX ADMIN — FLUCONAZOLE 100 MG: 100 TABLET ORAL at 08:01

## 2019-01-31 RX ADMIN — FAMOTIDINE 20 MG: 20 TABLET ORAL at 09:01

## 2019-01-31 RX ADMIN — PROPOFOL 25 MCG/KG/MIN: 10 INJECTION, EMULSION INTRAVENOUS at 10:01

## 2019-01-31 RX ADMIN — SULFAMETHOXAZOLE AND TRIMETHOPRIM 295 MG: 80; 16 INJECTION, SOLUTION, CONCENTRATE INTRAVENOUS at 05:01

## 2019-01-31 RX ADMIN — PROPOFOL 50 MCG/KG/MIN: 10 INJECTION, EMULSION INTRAVENOUS at 03:01

## 2019-01-31 RX ADMIN — SULFAMETHOXAZOLE AND TRIMETHOPRIM 295 MG: 80; 16 INJECTION, SOLUTION, CONCENTRATE INTRAVENOUS at 09:01

## 2019-01-31 RX ADMIN — METHYLPREDNISOLONE SODIUM SUCCINATE 60 MG: 125 INJECTION, POWDER, FOR SOLUTION INTRAMUSCULAR; INTRAVENOUS at 06:01

## 2019-01-31 RX ADMIN — PROPOFOL 45 MCG/KG/MIN: 10 INJECTION, EMULSION INTRAVENOUS at 07:01

## 2019-01-31 RX ADMIN — ETOMIDATE 14 MG: 2 INJECTION, SOLUTION INTRAVENOUS at 09:01

## 2019-01-31 RX ADMIN — FENTANYL CITRATE 50 MCG: 50 INJECTION INTRAMUSCULAR; INTRAVENOUS at 10:01

## 2019-01-31 RX ADMIN — SUCCINYLCHOLINE CHLORIDE 120 MG: 20 INJECTION, SOLUTION INTRAMUSCULAR; INTRAVENOUS at 09:01

## 2019-01-31 RX ADMIN — SULFAMETHOXAZOLE AND TRIMETHOPRIM 295 MG: 80; 16 INJECTION, SOLUTION, CONCENTRATE INTRAVENOUS at 12:01

## 2019-01-31 RX ADMIN — PROPOFOL 30 MG: 10 INJECTION, EMULSION INTRAVENOUS at 09:01

## 2019-01-31 RX ADMIN — METHYLPREDNISOLONE SODIUM SUCCINATE 60 MG: 125 INJECTION, POWDER, FOR SOLUTION INTRAMUSCULAR; INTRAVENOUS at 05:01

## 2019-01-31 NOTE — PROGRESS NOTES
"[facility] Medicine  Progress Note    Patient Name: Lior Prado  MRN: [id number]  Patient Class: IP- Inpatient   Admission Date: 1/28/2019  Length of Stay: 2 days  Attending Physician: Nhung Carter MD  Primary Care Provider: Primary Doctor No        Subjective:     Principal Problem:HIV disease    HPI:  Mr. Donnelly is a 44 yo M with no reported past medical history- presents with a CC of weakness and some SOB for one week. He notes that he was treated with abx for a "pneumonia" 1/13-1/23.  He presents to the ED and is found to have thrush. Further, his HIV is +. The patient was started on treatment for PJP pneumonia and ID was consulted. The patient is thin and ill appearing.      Hospital Course:  Mr. Donnelly is a 44 yo M with no reported past medical history- presents with a CC of weakness and some SOB for one week. He notes that he was treated with abx for a "pneumonia" 1/13-1/23.  He presents to the ED and is found to have thrush. Further, his HIV is +. The patient was started on treatment for PJP pneumonia and ID was consulted. The patient had a CTA of his chest as well.     Pt was transferred to ICU due to high amounts of O2 requirements and rising lactate/marginal BP.  Pancytopenia worsening and will need one unit platelet transfused.  On IVF and no indication for vasopressor. Pulmonology consulted for possible bronch in new Dx HIV, concern for PJP. Awaiting HIV workup/CD4, etc. ID consulted and assisting with antibacterial meds. Awaiting cultures.     1/30 CD4 count = 11; Pt O2 requirements going up, no won bipap; platelets 20k and will prepare for intubation for bronch tomorrow; antibiotics managed by ID - currently on bactrim and rocephin treating CAP and probable PJP. Diflucan for OP candida. Pancytopenia likely due to sepsis and possible opportunistic infections. Transaminases and lactate/LDH rising. Sepsis with out shock but prognosis is guarded.     Interval " History: pt doesn't have new complaints     Review of Systems   Constitutional: Positive for fatigue. Negative for chills, diaphoresis and fever.   HENT:        +oral thrush   Respiratory: Positive for shortness of breath. Negative for cough.    Cardiovascular: Negative for chest pain and leg swelling.   Gastrointestinal: Negative for abdominal pain, diarrhea, nausea and rectal pain.   Genitourinary: Negative for dysuria.   Musculoskeletal: Positive for myalgias.   Neurological: Positive for weakness.   All other systems reviewed and are negative.    Objective:     Vital Signs (Most Recent):  Temp: 97.8 °F (36.6 °C) (01/30/19 1600)  Pulse: 110 (01/30/19 1800)  Resp: (!) 28 (01/30/19 1800)  BP: (!) 108/53 (01/30/19 1800)  SpO2: (!) 94 % (01/30/19 1800) Vital Signs (24h Range):  Temp:  [97.2 °F (36.2 °C)-100.3 °F (37.9 °C)] 97.8 °F (36.6 °C)  Pulse:  [] 110  Resp:  [21-33] 28  SpO2:  [90 %-98 %] 94 %  BP: (100-116)/(53-65) 108/53     Weight: 59.8 kg (131 lb 13.4 oz)  Body mass index is 21.28 kg/m².    Intake/Output Summary (Last 24 hours) at 1/30/2019 1846  Last data filed at 1/30/2019 1700  Gross per 24 hour   Intake 2800 ml   Output 2075 ml   Net 725 ml      Physical Exam   Constitutional: He is oriented to person, place, and time. No distress.   Thin, ill appearing   HENT:   Head: Normocephalic.   Oral thrush noted   Eyes: Pupils are equal, round, and reactive to light.   Cardiovascular: Regular rhythm and normal heart sounds. Exam reveals no friction rub.   No murmur heard.  Pulmonary/Chest: Effort normal. No stridor. No respiratory distress. He has no wheezes.   Abdominal: Soft. He exhibits no distension. There is no tenderness. There is no guarding.   Musculoskeletal: Normal range of motion. He exhibits no edema, tenderness or deformity.   Neurological: He is alert and oriented to person, place, and time.   Skin: Skin is warm. He is not diaphoretic. No erythema. No pallor.   Psychiatric: He has a normal  mood and affect.   Vitals reviewed.      Significant Labs:   ABGs:   Recent Labs   Lab 01/30/19  1111   PH 7.394   PCO2 27.3*   HCO3 16.7*   POCSATURATED 83*   BE -7     Blood Culture: No results for input(s): LABBLOO in the last 48 hours.  CBC:   Recent Labs   Lab 01/30/19  0630 01/30/19  1024 01/30/19  1358   WBC 2.50* 2.97* 3.03*   HGB 9.1* 9.2* 9.8*   HCT 27.4* 27.5* 29.2*   PLT 14* 20* 22*     CMP:   Recent Labs   Lab 01/29/19  0653 01/30/19  0630   * 133*   K 3.9 4.3    107   CO2 22* 22*   * 136*   BUN 14 16   CREATININE 0.7 0.7   CALCIUM 7.2* 7.3*   PROT 4.9* 4.8*   ALBUMIN 1.2* 1.3*   BILITOT 0.4 0.3   ALKPHOS 130 151*   AST 97* 130*   ALT 32 33   ANIONGAP 8 4*   EGFRNONAA >60 >60     Coagulation:   Recent Labs   Lab 01/29/19  1117 01/30/19  1024   INR 1.3* 1.3*   APTT 34.5*  --      Lactic Acid:   Recent Labs   Lab 01/30/19  0012 01/30/19  0630 01/30/19  1024   LACTATE 3.7* 3.9* 4.5*     Magnesium: No results for input(s): MG in the last 48 hours.  Respiratory Culture: No results for input(s): GSRESP, RESPIRATORYC in the last 48 hours.  Urine Culture: No results for input(s): LABURIN in the last 48 hours.    Significant Imaging: I have reviewed and interpreted all pertinent imaging results/findings within the past 24 hours.    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, no visits to [place] in 7 years, no other sick contacts     ID consulted  - currently on bactrim, azithromycin, zosyn and vancomycin, oral diflucan added today for thrush      Acute hypoxemic respiratory failure    Secondary to probable CAP and/or PJP, cannot rule out TB  Appreciate pulmonology recs   Plan for bronch in am, will require  intubation  IV steroids, nebs, bipap PRN, antibiotics        Pancytopenia    Secondary to HIV/?AIDS  Thrombocytopenia 12k - transfuse one unit platelets - anticipate bronch    - Coags pending - TTP common with HIV        Elevated LFTs    Not sure of cause- from ETOH? Vs other - + HIV/sepsis   Hepatitis panel negative  No abdominal pain and bilirubin not suggesting obstructive pattern or HIV cholangiopathy          Malnutrition of moderate degree    Pt did not comment on changes in diet  + loose stools         Tobacco abuse    Smoking cessation education   Nicotine patch offered        Hyponatremia    Likely from lung involvement ?  SIADH?    Improved with IVF       Weakness    Will consider PT/OT consult when stable        Thrush    Started on diflucan        Pneumonia, unspecified organism    With hypoxia and sepsis. Will have broad spectrum Abx + treatment for PJP.  +/- steroids. Will discuss with ID.   Rocephin - CAP  Bactrim - probable PJP - concern with low platelets that alternative may be indicated if continues to drop - appreciate hematology input and recs   pulm consulted - should have bronch for PJP, TB concerns  Wean oxygen as tolerated             VTE Risk Mitigation (From admission, onward)        Ordered     IP VTE LOW RISK PATIENT  Once      01/28/19 1927     Place DENY hose  Until discontinued      01/28/19 1927          Critical care time spent on the evaluation and treatment of severe organ dysfunction, review of pertinent labs and imaging studies, discussions with consulting providers and discussions with patient/family: 30 minutes.    Nhung Carter MD  Department of Hospital Medicine   [facility]

## 2019-01-31 NOTE — SUBJECTIVE & OBJECTIVE
Interval History: pt doesn't have new complaints     Review of Systems   Constitutional: Positive for fatigue. Negative for chills, diaphoresis and fever.   HENT:        +oral thrush   Respiratory: Positive for shortness of breath. Negative for cough.    Cardiovascular: Negative for chest pain and leg swelling.   Gastrointestinal: Negative for abdominal pain, diarrhea, nausea and rectal pain.   Genitourinary: Negative for dysuria.   Musculoskeletal: Positive for myalgias.   Neurological: Positive for weakness.   All other systems reviewed and are negative.    Objective:     Vital Signs (Most Recent):  Temp: 97.8 °F (36.6 °C) (01/30/19 1600)  Pulse: 110 (01/30/19 1800)  Resp: (!) 28 (01/30/19 1800)  BP: (!) 108/53 (01/30/19 1800)  SpO2: (!) 94 % (01/30/19 1800) Vital Signs (24h Range):  Temp:  [97.2 °F (36.2 °C)-100.3 °F (37.9 °C)] 97.8 °F (36.6 °C)  Pulse:  [] 110  Resp:  [21-33] 28  SpO2:  [90 %-98 %] 94 %  BP: (100-116)/(53-65) 108/53     Weight: 59.8 kg (131 lb 13.4 oz)  Body mass index is 21.28 kg/m².    Intake/Output Summary (Last 24 hours) at 1/30/2019 1846  Last data filed at 1/30/2019 1700  Gross per 24 hour   Intake 2800 ml   Output 2075 ml   Net 725 ml      Physical Exam   Constitutional: He is oriented to person, place, and time. No distress.   Thin, ill appearing   HENT:   Head: Normocephalic.   Oral thrush noted   Eyes: Pupils are equal, round, and reactive to light.   Cardiovascular: Regular rhythm and normal heart sounds. Exam reveals no friction rub.   No murmur heard.  Pulmonary/Chest: Effort normal. No stridor. No respiratory distress. He has no wheezes.   Abdominal: Soft. He exhibits no distension. There is no tenderness. There is no guarding.   Musculoskeletal: Normal range of motion. He exhibits no edema, tenderness or deformity.   Neurological: He is alert and oriented to person, place, and time.   Skin: Skin is warm. He is not diaphoretic. No erythema. No pallor.   Psychiatric: He has a  normal mood and affect.   Vitals reviewed.      Significant Labs:   ABGs:   Recent Labs   Lab 01/30/19  1111   PH 7.394   PCO2 27.3*   HCO3 16.7*   POCSATURATED 83*   BE -7     Blood Culture: No results for input(s): LABBLOO in the last 48 hours.  CBC:   Recent Labs   Lab 01/30/19  0630 01/30/19  1024 01/30/19  1358   WBC 2.50* 2.97* 3.03*   HGB 9.1* 9.2* 9.8*   HCT 27.4* 27.5* 29.2*   PLT 14* 20* 22*     CMP:   Recent Labs   Lab 01/29/19  0653 01/30/19  0630   * 133*   K 3.9 4.3    107   CO2 22* 22*   * 136*   BUN 14 16   CREATININE 0.7 0.7   CALCIUM 7.2* 7.3*   PROT 4.9* 4.8*   ALBUMIN 1.2* 1.3*   BILITOT 0.4 0.3   ALKPHOS 130 151*   AST 97* 130*   ALT 32 33   ANIONGAP 8 4*   EGFRNONAA >60 >60     Coagulation:   Recent Labs   Lab 01/29/19  1117 01/30/19  1024   INR 1.3* 1.3*   APTT 34.5*  --      Lactic Acid:   Recent Labs   Lab 01/30/19  0012 01/30/19  0630 01/30/19  1024   LACTATE 3.7* 3.9* 4.5*     Magnesium: No results for input(s): MG in the last 48 hours.  Respiratory Culture: No results for input(s): GSRESP, RESPIRATORYC in the last 48 hours.  Urine Culture: No results for input(s): LABURIN in the last 48 hours.    Significant Imaging: I have reviewed and interpreted all pertinent imaging results/findings within the past 24 hours.

## 2019-01-31 NOTE — SUBJECTIVE & OBJECTIVE
Interval History:   Intubated  Bronchoscopy today  Cultures pending      Review of Systems   Unable to perform ROS: Intubated     Objective:     Vital Signs (Most Recent):  Temp: 96.7 °F (35.9 °C) (01/31/19 1115)  Pulse: 74 (01/31/19 1409)  Resp: (!) 29 (01/31/19 1409)  BP: (!) 92/53 (01/31/19 1402)  SpO2: 100 % (01/31/19 1409) Vital Signs (24h Range):  Temp:  [96.7 °F (35.9 °C)-99 °F (37.2 °C)] 96.7 °F (35.9 °C)  Pulse:  [] 74  Resp:  [14-34] 29  SpO2:  [84 %-100 %] 100 %  BP: ()/(49-93) 92/53     Weight: 59.8 kg (131 lb 13.4 oz)  Body mass index is 21.28 kg/m².    Estimated Creatinine Clearance: 134.3 mL/min (based on SCr of 0.6 mg/dL).    Physical Exam   Constitutional: No distress.   Thin, ill appearing   HENT:   Head: Normocephalic.   Oral thrush noted  Intubated  sedated   Eyes: Pupils are equal, round, and reactive to light.   Cardiovascular: Regular rhythm and normal heart sounds. Exam reveals no friction rub.   No murmur heard.  Pulmonary/Chest: No stridor. No respiratory distress. He has no wheezes.   intubated   Abdominal: Soft. He exhibits no distension. There is no tenderness. There is no guarding.   Musculoskeletal: He exhibits no tenderness.   Skin: Skin is warm. He is not diaphoretic. No erythema. No pallor.   Psychiatric: He has a normal mood and affect.   Vitals reviewed.      Significant Labs:   Blood Culture:   Recent Labs   Lab 01/28/19  1023 01/28/19  1028   LABBLOO No Growth to date  No Growth to date  No Growth to date  No Growth to date No Growth to date  No Growth to date  No Growth to date  No Growth to date     CBC:   Recent Labs   Lab 01/30/19  1024 01/30/19  1358 01/31/19  0608   WBC 2.97* 3.03* 2.62*   HGB 9.2* 9.8* 8.2*   HCT 27.5* 29.2* 24.7*   PLT 20* 22* 22*     CMP:   Recent Labs   Lab 01/30/19  0630 01/31/19  0608   * 132*   K 4.3 4.1    102   CO2 22* 25   * 142*   BUN 16 16   CREATININE 0.7 0.6   CALCIUM 7.3* 7.4*   PROT 4.8* 4.8*   ALBUMIN  1.3* 1.3*   BILITOT 0.3 0.3   ALKPHOS 151* 320*   * 373*   ALT 33 148*   ANIONGAP 4* 5*   EGFRNONAA >60 >60     Hepatitis Panel: No results for input(s): HEPBSAG, HEPAIGM, HEPCAB in the last 48 hours.    Invalid input(s): HAPBIGM  HIV 1/2 Antibodies: No results for input(s): COD70BPIO in the last 48 hours.  HIV Rapid: No results for input(s): HIVRAPID in the last 48 hours.  Lactic Acid:   Recent Labs   Lab 01/31/19  0008 01/31/19  0623 01/31/19  1215   LACTATE 3.0* 3.9* 3.1*     Procalcitonin: No results for input(s): PROCAL in the last 48 hours.  Quantiferon: No results for input(s): NIL, TBAG, TBAGNIL, MITOGENNIL, TBGOLD in the last 48 hours.  Respiratory Culture:   Recent Labs   Lab 01/31/19  1035   GSRESP <10 epithelial cells per low power field.  Many WBC's  Many budding yeast     Wound Culture: No results for input(s): LABAERO in the last 4320 hours.  All pertinent labs within the past 24 hours have been reviewed.    Significant Imaging: I have reviewed all pertinent imaging results/findings within the past 24 hours.

## 2019-01-31 NOTE — ASSESSMENT & PLAN NOTE
pancytopenia during hospitalization likely related to sepsis/infection and/or drug-induced resulting in marrow suppression/infiltration. Pt may have bm infiltration from opportunistic infections.   Extensive workup pending including workup for deficiency states  Unremarkable and No  evidence of hemolysis.       Review of peripheral smear reveals anemia with hypochromic microcytosis, scattered schistocytes and spherocytes.  Medications such as bactrim known SE of thrombocytopenia.   Coagulopathy resolved  Cont to monitor

## 2019-01-31 NOTE — ASSESSMENT & PLAN NOTE
Secondary to probable CAP and/or PJP, cannot rule out TB  Appreciate pulmonology recs   Plan for bronch in am, will require intubation  IV steroids, nebs, bipap PRN, antibiotics

## 2019-01-31 NOTE — SUBJECTIVE & OBJECTIVE
Interval History: Pt s/p bronchoscopy. Pt remains intubated.    Oncology Treatment Plan:   [No treatment plan]    Medications:  Continuous Infusions:   fentanyl 25 mcg/hr (01/31/19 1536)    propofol 49.889 mcg/kg/min (01/31/19 1536)     Scheduled Meds:   albuterol-ipratropium  3 mL Nebulization Q4H    famotidine  20 mg Oral BID    [START ON 2/1/2019] fluconazole  400 mg Oral Daily    lidocaine HCL 4%        methylPREDNISolone sodium succinate  60 mg Intravenous Q6H    trimethoprim-sulfamethoxasole (BACTRIM) IVPB (custom)  295 mg Intravenous Q8H     PRN Meds:acetaminophen, ondansetron     Review of Systems   Unable to perform ROS: Intubated     Objective:     Vital Signs (Most Recent):  Temp: 97.5 °F (36.4 °C) (01/31/19 1530)  Pulse: 84 (01/31/19 1600)  Resp: 13 (01/31/19 1600)  BP: (!) 89/52 (01/31/19 1600)  SpO2: 96 % (01/31/19 1600) Vital Signs (24h Range):  Temp:  [96.7 °F (35.9 °C)-99 °F (37.2 °C)] 97.5 °F (36.4 °C)  Pulse:  [] 84  Resp:  [13-34] 13  SpO2:  [84 %-100 %] 96 %  BP: ()/(49-93) 89/52     Weight: 59.8 kg (131 lb 13.4 oz)  Body mass index is 21.28 kg/m².  Body surface area is 1.67 meters squared.      Intake/Output Summary (Last 24 hours) at 1/31/2019 1656  Last data filed at 1/31/2019 1536  Gross per 24 hour   Intake 971.33 ml   Output 1810 ml   Net -838.67 ml       Physical Exam   Constitutional: He has a sickly appearance. He is intubated.   sedated   HENT:   Head: Normocephalic.   intubated   Eyes: Conjunctivae are normal. No scleral icterus.   Cardiovascular: Normal rate, regular rhythm and normal heart sounds.   No murmur heard.  Pulmonary/Chest: He is intubated. He has no wheezes. He has no rhonchi.   Abdominal: Soft. Bowel sounds are normal. There is no hepatosplenomegaly. There is no tenderness. There is no guarding.   Musculoskeletal: He exhibits no edema.   Neurological:   sedated   Skin: No rash noted. No erythema.       Significant Labs:   All pertinent labs within the  past 24 hours have been reviewed  Results for CHARLIE MCKEON (MRN [id number]) as of 1/31/2019 17:01   Ref. Range 1/31/2019 06:08   Protime Latest Ref Range: 9.0 - 12.5 sec 12.4   Coumadin Monitoring INR Latest Ref Range: 0.8 - 1.2  1.2   aPTT Latest Ref Range: 21.0 - 32.0 sec 29.6   Fibrinogen Latest Ref Range: 182 - 366 mg/dL 122 (L)     Diagnostic Results:  I have reviewed and interpreted all pertinent imaging results/findings within the past 24 hours

## 2019-01-31 NOTE — ASSESSMENT & PLAN NOTE
--appreciate Heme/Onc assistance.   --bone marrow suppression likely from acute infection vs HIV; less likely medication induced (on bactrim, famotidine; however, patient thrombocytopenic prior to receiving these meds)  --labs not indicative of hemolysis   --DIC unlikely as haptoglobin less than 150; but, ptt not prolonged  --no signs of bleeding at this time  --would only transfuse if Hgb <7, platelets <10k or platelets <50K with active bleeeing

## 2019-01-31 NOTE — ASSESSMENT & PLAN NOTE
42 y/o [place]ian speaking male with no significant medical history presents with progressively worsening SOB, cough, weakness and 6kg weight loss. Denies hemoptysis or sputum production. He was found to be positive for HIV. We are consulted further evaluation and management.     Blood cultures NGTD  Infectious workup pending  S/p bronchoscopy.         1. Continue IV Bactrim for PJP pneumonia prophylaxis. D/c ceftriaxone at this time. respiratory cultures no WBCs seen.  Will hold off adding therapy for MAC prophylaxis (macrolides,fqs) as monotherapy for MAC can increase drug resistance.   2. HIV positive CD4 count 11   3. Bronchoscopy this AM.Cultures sent for fungal, RVP, gram stain and aerobic/anaeoribc. Added AFB cultures + TB PCR. Discussed with microlab. Lab received specimen and orders. Discussed with pathology to perform PCP smear on samples sent to pathology today.   4. Discussed with nursing to draw AFB blood cultures -pending   5. Additional studies ordered - toxoplasma IgG, fungal studies, quantiferon gold TB pending and RPR. quantgold indeterminant. Repeat quantgold.   5. CTA chest 1.6 cm cavitation lesion of RUL.  6. Increased dose 400 mg fluconazole   7. ID will follow closely with you.   8. Social situation at home difficult as without permanent residence.

## 2019-01-31 NOTE — PROGRESS NOTES
[facility]  Infectious Disease  Progress Note    Patient Name: Lior Prado  MRN: [id number]  Admission Date: 1/28/2019  Length of Stay: 3 days  Attending Physician: Nhung Carter MD  Primary Care Provider: Primary Doctor No    Isolation Status: Special Contact  Assessment/Plan:      * HIV disease    44 y/o Occitan speaking male with no significant medical history presents with progressively worsening SOB, cough, weakness and 6kg weight loss. Denies hemoptysis or sputum production. He was found to be positive for HIV. We are consulted further evaluation and management.     Blood cultures NGTD  Infectious workup pending  S/p bronchoscopy.         1. Continue IV Bactrim for PJP pneumonia prophylaxis. D/c ceftriaxone at this time. respiratory cultures no WBCs seen.  Will hold off adding therapy for MAC prophylaxis (macrolides,fqs) as monotherapy for MAC can increase drug resistance.   2. HIV positive CD4 count 11   3. Bronchoscopy this AM.Cultures sent for fungal, RVP, gram stain and aerobic/anaeoribc. Added AFB cultures + TB PCR. Discussed with microlab. Lab received specimen and orders. Discussed with pathology to perform PCP smear on samples sent to pathology today.   4. Discussed with nursing to draw AFB blood cultures -pending   5. Additional studies ordered - toxoplasma IgG, fungal studies, quantiferon gold TB pending and RPR. quantgold indeterminant. Repeat quantgold.   5. CTA chest 1.6 cm cavitation lesion of RUL.  6. Increased dose 400 mg fluconazole   7. ID will follow closely with you.   8. Social situation at home difficult as without permanent residence.         Thank you for your consult. I will follow-up with patient. Please contact us if you have any additional questions.    Dania Rogers PA-C  Infectious Disease  [facility]  Pgr [phone]    Subjective:     Principal Problem:HIV disease    HPI: 44 y/o Occitan speaking male with no significant medical  history presents with progressively worsening SOB and weakness for the past week. Associated symptoms include dizziness. He reports having a cough (mostly at nights) for the past week. Denies hemoptysis or sputum production. He denies having any fever chills or night sweats at home. No n/v/d,dysuria. He reports having dyspnea on exertion and 6kg weight loss. On admission he was found to be febrile and significantly diaphoretic. His HIV rapid test is positive. Concerns for PJP pneumonia. He is thin and ill appearing.    He works as a . He denies incarceration or homelessness. He denies knowing about HIV diagnosis. He smokes, denies IVDU or illicit drug use. He denies exposure to TB. No significant allergies at this time.     Blood cultures NGTD  He is febrile 101 and tachycardic  Interval History:   Intubated  Bronchoscopy today  Cultures pending      Review of Systems   Unable to perform ROS: Intubated     Objective:     Vital Signs (Most Recent):  Temp: 96.7 °F (35.9 °C) (01/31/19 1115)  Pulse: 74 (01/31/19 1409)  Resp: (!) 29 (01/31/19 1409)  BP: (!) 92/53 (01/31/19 1402)  SpO2: 100 % (01/31/19 1409) Vital Signs (24h Range):  Temp:  [96.7 °F (35.9 °C)-99 °F (37.2 °C)] 96.7 °F (35.9 °C)  Pulse:  [] 74  Resp:  [14-34] 29  SpO2:  [84 %-100 %] 100 %  BP: ()/(49-93) 92/53     Weight: 59.8 kg (131 lb 13.4 oz)  Body mass index is 21.28 kg/m².    Estimated Creatinine Clearance: 134.3 mL/min (based on SCr of 0.6 mg/dL).    Physical Exam   Constitutional: No distress.   Thin, ill appearing   HENT:   Head: Normocephalic.   Oral thrush noted  Intubated  sedated   Eyes: Pupils are equal, round, and reactive to light.   Cardiovascular: Regular rhythm and normal heart sounds. Exam reveals no friction rub.   No murmur heard.  Pulmonary/Chest: No stridor. No respiratory distress. He has no wheezes.   intubated   Abdominal: Soft. He exhibits no distension. There is no tenderness. There is no guarding.    Musculoskeletal: He exhibits no tenderness.   Skin: Skin is warm. He is not diaphoretic. No erythema. No pallor.   Psychiatric: He has a normal mood and affect.   Vitals reviewed.      Significant Labs:   Blood Culture:   Recent Labs   Lab 01/28/19  1023 01/28/19  1028   LABBLOO No Growth to date  No Growth to date  No Growth to date  No Growth to date No Growth to date  No Growth to date  No Growth to date  No Growth to date     CBC:   Recent Labs   Lab 01/30/19  1024 01/30/19  1358 01/31/19  0608   WBC 2.97* 3.03* 2.62*   HGB 9.2* 9.8* 8.2*   HCT 27.5* 29.2* 24.7*   PLT 20* 22* 22*     CMP:   Recent Labs   Lab 01/30/19  0630 01/31/19  0608   * 132*   K 4.3 4.1    102   CO2 22* 25   * 142*   BUN 16 16   CREATININE 0.7 0.6   CALCIUM 7.3* 7.4*   PROT 4.8* 4.8*   ALBUMIN 1.3* 1.3*   BILITOT 0.3 0.3   ALKPHOS 151* 320*   * 373*   ALT 33 148*   ANIONGAP 4* 5*   EGFRNONAA >60 >60     Hepatitis Panel: No results for input(s): HEPBSAG, HEPAIGM, HEPCAB in the last 48 hours.    Invalid input(s): HAPBIGM  HIV 1/2 Antibodies: No results for input(s): [id number] in the last 48 hours.  HIV Rapid: No results for input(s): HIVRAPID in the last 48 hours.  Lactic Acid:   Recent Labs   Lab 01/31/19  0008 01/31/19  0623 01/31/19  1215   LACTATE 3.0* 3.9* 3.1*     Procalcitonin: No results for input(s): PROCAL in the last 48 hours.  Quantiferon: No results for input(s): NIL, TBAG, TBAGNIL, MITOGENNIL, TBGOLD in the last 48 hours.  Respiratory Culture:   Recent Labs   Lab 01/31/19  1035   GSRESP <10 epithelial cells per low power field.  Many WBC's  Many budding yeast     Wound Culture: No results for input(s): LABAERO in the last 4320 hours.  All pertinent labs within the past 24 hours have been reviewed.    Significant Imaging: I have reviewed all pertinent imaging results/findings within the past 24 hours.

## 2019-01-31 NOTE — ASSESSMENT & PLAN NOTE
With hypoxia and sepsis. Will have broad spectrum Abx + treatment for PJP.  +/- steroids. Will discuss with ID.   Rocephin - CAP  Bactrim - probable PJP - concern with low platelets that alternative may be indicated if continues to drop - appreciate hematology input and recs   pulm consulted - should have bronch for PJP, TB concerns  Wean oxygen as tolerated

## 2019-01-31 NOTE — ASSESSMENT & PLAN NOTE
Pulm following  Pt treated with  antibiotics including bactrim and  steroids for suspected PJP and PNA  concerns for TB  CTA shows1.6 cm cavitation lesion of RUL  S/p FOB  - cultures sent

## 2019-01-31 NOTE — SUBJECTIVE & OBJECTIVE
Review of Systems   Constitutional: Positive for diaphoresis, fatigue and unexpected weight change.   Respiratory: Positive for shortness of breath. Negative for cough.    Cardiovascular: Positive for palpitations. Negative for chest pain and leg swelling.   Gastrointestinal: Negative for diarrhea and nausea.   Skin: Negative for rash.   Neurological: Positive for weakness.       Objective:     Vital Signs (Most Recent):  Temp: 98.6 °F (37 °C) (01/31/19 0949)  Pulse: 89 (01/31/19 1045)  Resp: (!) 33 (01/31/19 1045)  BP: 118/76 (01/31/19 1035)  SpO2: 97 % (01/31/19 1045) Vital Signs (24h Range):  Temp:  [97.2 °F (36.2 °C)-99 °F (37.2 °C)] 98.6 °F (37 °C)  Pulse:  [] 89  Resp:  [14-33] 33  SpO2:  [84 %-99 %] 97 %  BP: (104-142)/(53-93) 118/76   Weight: 59.8 kg (131 lb 13.4 oz)  Body mass index is 21.28 kg/m².      Intake/Output Summary (Last 24 hours) at 1/31/2019 1051  Last data filed at 1/31/2019 0800  Gross per 24 hour   Intake 1530 ml   Output 2735 ml   Net -1205 ml       Physical Exam   Constitutional: He is oriented to person, place, and time. Vital signs are normal. He appears cachectic. He is cooperative. He has a sickly appearance. No distress. Nasal cannula in place.   HENT:   Head: Normocephalic and atraumatic.   Eyes: Conjunctivae are normal. Pupils are equal, round, and reactive to light. Right eye exhibits no exudate. Left eye exhibits no exudate. Right conjunctiva has no hemorrhage. Left conjunctiva has no hemorrhage. No scleral icterus. Right eye exhibits no nystagmus. Left eye exhibits no nystagmus.   Neck: Trachea normal. No neck rigidity. No tracheal deviation present.   Cardiovascular: Normal rate, regular rhythm and normal heart sounds. PMI is not displaced. Exam reveals no gallop and no friction rub.   No murmur heard.  Pulses:       Radial pulses are 2+ on the right side, and 2+ on the left side.        Dorsalis pedis pulses are 1+ on the right side, and 1+ on the left side.    Pulmonary/Chest: No accessory muscle usage. Tachypnea noted. He is in respiratory distress. He has no wheezes. He has rhonchi. He has rales in the right middle field, the right lower field, the left middle field and the left lower field.   Abdominal: Soft. Normal appearance and bowel sounds are normal. There is no tenderness.   Musculoskeletal: Normal range of motion.   Neurological: He is alert and oriented to person, place, and time. No cranial nerve deficit or sensory deficit. GCS eye subscore is 4. GCS verbal subscore is 5. GCS motor subscore is 6.   Skin: Skin is warm and dry. Capillary refill takes 2 to 3 seconds. He is not diaphoretic. No cyanosis. Nails show no clubbing.   Psychiatric: He has a normal mood and affect. His behavior is normal.   Nursing note and vitals reviewed.      Vents:  Vent Mode: PRVC (01/31/19 1000)  Ventilator Initiated: Yes (01/31/19 1000)  Set Rate: 15 bmp (01/31/19 1000)  Vt Set: 430 mL (01/31/19 1000)  Pressure Support: 7 cmH20 (01/30/19 1111)  PEEP/CPAP: 5 cmH20 (01/31/19 1000)  Oxygen Concentration (%): 70 (01/31/19 1045)  Peak Airway Pressure: 17.7 cmH2O (01/31/19 1000)  Total Ve: 6.4 mL (01/31/19 1000)  F/VT Ratio<105 (RSBI): (!) 33.57 (01/31/19 1000)  Lines/Drains/Airways     Drain                 Urethral Catheter 01/31/19 1010 Non-latex less than 1 day          Airway                 Airway - Non-Surgical 01/31/19 0955 Endotracheal Tube less than 1 day          Peripheral Intravenous Line                 Peripheral IV - Single Lumen 01/29/19 0913 Right Hand 2 days         Peripheral IV - Single Lumen 01/30/19 0230 Right Forearm 1 day              Significant Labs:    CBC/Anemia Profile:  Recent Labs   Lab 01/30/19  1024 01/30/19  1358 01/31/19  0608   WBC 2.97* 3.03* 2.62*   HGB 9.2* 9.8* 8.2*   HCT 27.5* 29.2* 24.7*   PLT 20* 22* 22*   MCV 74* 74* 74*   RDW 14.7* 14.6* 14.5   IRON 73  --   --    RETIC 0.8  --   --    [id number] 1035*  --   --         Chemistries:  Recent  Labs   Lab 01/30/19  0630 01/31/19  0608   * 132*   K 4.3 4.1    102   CO2 22* 25   BUN 16 16   CREATININE 0.7 0.6   CALCIUM 7.3* 7.4*   ALBUMIN 1.3* 1.3*   PROT 4.8* 4.8*   BILITOT 0.3 0.3   ALKPHOS 151* 320*   ALT 33 148*   * 373*       All pertinent labs within the past 24 hours have been reviewed.    Significant Imaging:  I have reviewed all pertinent imaging results/findings within the past 24 hours.

## 2019-01-31 NOTE — PROCEDURES
[facility]  Bronchoscopy   Procedure Note    SUMMARY     Date of Procedure: 1/31/2019    Procedure: Bronchoscopy, Diagnostic    Provider: Gigi Powers MD    Assisting Provider: Keaton Pritchett    Pre-Procedure Diagnosis: pneumonia    Post-Procedure Diagnosis: pneumonia    Indication:  HIV with pneumonia    Anesthesia: General Anesthesia    Technical Procedures Used: BAL    Description of the Findings of the Procedure:     Patient was consented for the procedure with all risk and benefit of the procedure explained in detail.  Patient was given the opportunity to ask questions and all concerns were answered.  The bronchocope was inserted into the main airway via the endotracheal tube. An anatomical survey was done of the main airways and the subsegmental bronchus.  The findings are reported above.  A bronchialalveolar lavage was performed using aliquots of normal saline instilled into the airways then aspirated back.    Significant Surgical Tasks Conducted by the Assistant(s), if Applicable: none    Complications: None; patient tolerated the procedure well.    Estimated Blood Loss (EBL): Minimal           Implants: none    Specimens: 120 cc fluid in and 35 cc fluid aspirated via rml and rll.  Sent purulent fluid       Condition: Stable        Disposition: ICU - intubated and critically ill.    Attestation: I was present for the entire procedure.

## 2019-01-31 NOTE — PROGRESS NOTES
[facility]  Hematology/Oncology  Progress Note    Patient Name: Lior Prado  Admission Date: 1/28/2019  Hospital Length of Stay: 3 days  Code Status: Full Code     Subjective:     HPI:  Pt is a Chinese speaking 44 y/o male with no pmhx presented with generalized weakness and increasing SOB and cough x 1 week.  No hemoptysis/chest pain. Patient reports he has been losing weight.  He also reports fevers night sweats for the past week.  In the ED patient was also diagnosed with thrush.  He is HIV positive.  He was started on treatment for  PJP pneumonia. Pt in ICU for 02 requirements secondary to hypoxia.  CBC on admit 01/28/2018 reveals white blood cell count of 3.57 hemoglobin of 10.5 grams/deciliter hematocrit 30.7% and a platelet count of 75k.  This platelet count trending during hospitalization to 12 K.  Patient has undergone platelet transfusion with recent platelet counts of 20 K. No active bleeding.  Consulted for thrombocytopenia.    Interval History: Pt s/p bronchoscopy. Pt remains intubated.    Oncology Treatment Plan:   [No treatment plan]    Medications:  Continuous Infusions:   fentanyl 25 mcg/hr (01/31/19 1536)    propofol 49.889 mcg/kg/min (01/31/19 1536)     Scheduled Meds:   albuterol-ipratropium  3 mL Nebulization Q4H    famotidine  20 mg Oral BID    [START ON 2/1/2019] fluconazole  400 mg Oral Daily    lidocaine HCL 4%        methylPREDNISolone sodium succinate  60 mg Intravenous Q6H    trimethoprim-sulfamethoxasole (BACTRIM) IVPB (custom)  295 mg Intravenous Q8H     PRN Meds:acetaminophen, ondansetron     Review of Systems   Unable to perform ROS: Intubated     Objective:     Vital Signs (Most Recent):  Temp: 97.5 °F (36.4 °C) (01/31/19 1530)  Pulse: 84 (01/31/19 1600)  Resp: 13 (01/31/19 1600)  BP: (!) 89/52 (01/31/19 1600)  SpO2: 96 % (01/31/19 1600) Vital Signs (24h Range):  Temp:  [96.7 °F (35.9 °C)-99 °F (37.2 °C)] 97.5 °F (36.4 °C)  Pulse:  []  84  Resp:  [13-34] 13  SpO2:  [84 %-100 %] 96 %  BP: ()/(49-93) 89/52     Weight: 59.8 kg (131 lb 13.4 oz)  Body mass index is 21.28 kg/m².  Body surface area is 1.67 meters squared.      Intake/Output Summary (Last 24 hours) at 1/31/2019 1656  Last data filed at 1/31/2019 1536  Gross per 24 hour   Intake 971.33 ml   Output 1810 ml   Net -838.67 ml       Physical Exam   Constitutional: He has a sickly appearance. He is intubated.   sedated   HENT:   Head: Normocephalic.   intubated   Eyes: Conjunctivae are normal. No scleral icterus.   Cardiovascular: Normal rate, regular rhythm and normal heart sounds.   No murmur heard.  Pulmonary/Chest: He is intubated. He has no wheezes. He has no rhonchi.   Abdominal: Soft. Bowel sounds are normal. There is no hepatosplenomegaly. There is no tenderness. There is no guarding.   Musculoskeletal: He exhibits no edema.   Neurological:   sedated   Skin: No rash noted. No erythema.       Significant Labs:   All pertinent labs within the past 24 hours have been reviewed  Results for CHARLIE MCKEON (MRN [id number]) as of 1/31/2019 17:01   Ref. Range 1/31/2019 06:08   Protime Latest Ref Range: 9.0 - 12.5 sec 12.4   Coumadin Monitoring INR Latest Ref Range: 0.8 - 1.2  1.2   aPTT Latest Ref Range: 21.0 - 32.0 sec 29.6   Fibrinogen Latest Ref Range: 182 - 366 mg/dL 122 (L)     Diagnostic Results:  I have reviewed and interpreted all pertinent imaging results/findings within the past 24 hours    Assessment/Plan:     * HIV disease    Followed by ID   CD4 count 11  Pt undergoing IV Bactrim for PJP pneumonia prophylaxis.   He is undergoing ceftriaxone empirically for CAP     Acute hypoxemic respiratory failure    Pulm following  Pt treated with  antibiotics including bactrim and  steroids for suspected PJP and PNA  concerns for TB  CTA shows1.6 cm cavitation lesion of RUL  S/p FOB  - cultures sent     Pancytopenia     pancytopenia during hospitalization likely related to  sepsis/infection and/or drug-induced resulting in marrow suppression/infiltration. Pt may have bm infiltration from opportunistic infections.   Extensive workup pending including workup for deficiency states  Unremarkable and No  evidence of hemolysis.       Review of peripheral smear reveals anemia with hypochromic microcytosis, scattered schistocytes and spherocytes.  Medications such as bactrim known SE of thrombocytopenia.   Coagulopathy resolved  Cont to monitor             Caro Rendon MD  Hematology/Oncology  [facility]

## 2019-01-31 NOTE — PLAN OF CARE
Problem: Adult Inpatient Plan of Care  Goal: Plan of Care Review  Outcome: Ongoing (interventions implemented as appropriate)  Pt remains in ICU. Intubated this shift for bronchoscopy and plan is to keep intubated due to high oxygen requirements. Bronchoscopy specimens sent for culture and TB screening. Propofol and fentanyl for sedation. Restrained due to pulling lines. Dick with adequate output. Remains free from fall, injury, or breakdown throughout shift.

## 2019-01-31 NOTE — PLAN OF CARE
Problem: Adult Inpatient Plan of Care  Goal: Plan of Care Review  Outcome: Ongoing (interventions implemented as appropriate)  Pt remains in ICU overnight on BiPAP. AAO. U/O adequate. No BM during this shift. ABX given per orders. Pt remains free of any falls, injuries, or skin breakdown during this shift.

## 2019-01-31 NOTE — ASSESSMENT & PLAN NOTE
--new diagnosis, no disclosure to anyone, source of transmission unknown. Denies intercourse x 7 years, no IV drug use, no incarceration, no blood transfusions, no other sick contacts, he did get tattoos in last 7 years here in [place].  --continue bactrim, azithromycin, zosyn and vancomycin, oral diflucan   --CD4 count 10.7%  --ID following

## 2019-01-31 NOTE — PROGRESS NOTES
"[facility]  Critical Care Medicine  Progress Note    Patient Name: Lior Prado  MRN: [id number]  Admission Date: 1/28/2019  Hospital Length of Stay: 3 days  Code Status: Full Code  Attending Provider: Nhung Carter MD  Primary Care Provider: Primary Doctor No   Principal Problem: HIV disease    Subjective:     HPI:  Mr. Donnelly is a 44 yo M with no reported past medical history- presents with weakness and SOB for one week. He was treated with abx for a "pneumonia" 1/13-1/23 at outpatient facility. In ED, he was found to have thrush and rapid HIV+. CTA of his chest with a large RUL cavitary lesion and numerous smaller nodules noted to bilateral lungs concerning for PJP. Bactrim started with broad spectrum abx and ID was consulted for antibiotic management. He was transferred to ICU due to high amounts of O2 requirements and increaed lactate.  Pancytopenia worsening and one unit platelets transfused. Pulmonology consulted for possible bronch in new Dx HIV, concern for PJP. CD4 count pending.     Oxygen requirements present increased risk for safe bronch. Use 3% saline to induce sputum production to obtain sample. If respiratory status deteriorates and he requires intubation, bronchoscopy with BAL will be completed. Alternate high flow nasal cannula with BIPAP every 4 hours as tolerated. Plan is to intubate and bronch this am    *Mr. Donnelly is Frisian speaking only. Language line utilized for subjective questioning and to communicate plan of care    Hospital/ICU Course:  No notes on file    Review of Systems   Constitutional: Positive for diaphoresis, fatigue and unexpected weight change.   Respiratory: Positive for shortness of breath. Negative for cough.    Cardiovascular: Positive for palpitations. Negative for chest pain and leg swelling.   Gastrointestinal: Negative for diarrhea and nausea.   Skin: Negative for rash.   Neurological: Positive for weakness.       Objective: "     Vital Signs (Most Recent):  Temp: 98.6 °F (37 °C) (01/31/19 0949)  Pulse: 89 (01/31/19 1045)  Resp: (!) 33 (01/31/19 1045)  BP: 118/76 (01/31/19 1035)  SpO2: 97 % (01/31/19 1045) Vital Signs (24h Range):  Temp:  [97.2 °F (36.2 °C)-99 °F (37.2 °C)] 98.6 °F (37 °C)  Pulse:  [] 89  Resp:  [14-33] 33  SpO2:  [84 %-99 %] 97 %  BP: (104-142)/(53-93) 118/76   Weight: 59.8 kg (131 lb 13.4 oz)  Body mass index is 21.28 kg/m².      Intake/Output Summary (Last 24 hours) at 1/31/2019 1051  Last data filed at 1/31/2019 0800  Gross per 24 hour   Intake 1530 ml   Output 2735 ml   Net -1205 ml       Physical Exam   Constitutional: He is oriented to person, place, and time. Vital signs are normal. He appears cachectic. He is cooperative. He has a sickly appearance. No distress. Nasal cannula in place.   HENT:   Head: Normocephalic and atraumatic.   Eyes: Conjunctivae are normal. Pupils are equal, round, and reactive to light. Right eye exhibits no exudate. Left eye exhibits no exudate. Right conjunctiva has no hemorrhage. Left conjunctiva has no hemorrhage. No scleral icterus. Right eye exhibits no nystagmus. Left eye exhibits no nystagmus.   Neck: Trachea normal. No neck rigidity. No tracheal deviation present.   Cardiovascular: Normal rate, regular rhythm and normal heart sounds. PMI is not displaced. Exam reveals no gallop and no friction rub.   No murmur heard.  Pulses:       Radial pulses are 2+ on the right side, and 2+ on the left side.        Dorsalis pedis pulses are 1+ on the right side, and 1+ on the left side.   Pulmonary/Chest: No accessory muscle usage. Tachypnea noted. He is in respiratory distress. He has no wheezes. He has rhonchi. He has rales in the right middle field, the right lower field, the left middle field and the left lower field.   Abdominal: Soft. Normal appearance and bowel sounds are normal. There is no tenderness.   Musculoskeletal: Normal range of motion.   Neurological: He is alert and  oriented to person, place, and time. No cranial nerve deficit or sensory deficit. GCS eye subscore is 4. GCS verbal subscore is 5. GCS motor subscore is 6.   Skin: Skin is warm and dry. Capillary refill takes 2 to 3 seconds. He is not diaphoretic. No cyanosis. Nails show no clubbing.   Psychiatric: He has a normal mood and affect. His behavior is normal.   Nursing note and vitals reviewed.      Vents:  Vent Mode: PRVC (01/31/19 1000)  Ventilator Initiated: Yes (01/31/19 1000)  Set Rate: 15 bmp (01/31/19 1000)  Vt Set: 430 mL (01/31/19 1000)  Pressure Support: 7 cmH20 (01/30/19 1111)  PEEP/CPAP: 5 cmH20 (01/31/19 1000)  Oxygen Concentration (%): 70 (01/31/19 1045)  Peak Airway Pressure: 17.7 cmH2O (01/31/19 1000)  Total Ve: 6.4 mL (01/31/19 1000)  F/VT Ratio<105 (RSBI): (!) 33.57 (01/31/19 1000)  Lines/Drains/Airways     Drain                 Urethral Catheter 01/31/19 1010 Non-latex less than 1 day          Airway                 Airway - Non-Surgical 01/31/19 0955 Endotracheal Tube less than 1 day          Peripheral Intravenous Line                 Peripheral IV - Single Lumen 01/29/19 0913 Right Hand 2 days         Peripheral IV - Single Lumen 01/30/19 0230 Right Forearm 1 day              Significant Labs:    CBC/Anemia Profile:  Recent Labs   Lab 01/30/19  1024 01/30/19  1358 01/31/19  0608   WBC 2.97* 3.03* 2.62*   HGB 9.2* 9.8* 8.2*   HCT 27.5* 29.2* 24.7*   PLT 20* 22* 22*   MCV 74* 74* 74*   RDW 14.7* 14.6* 14.5   IRON 73  --   --    RETIC 0.8  --   --    [id number] 1035*  --   --         Chemistries:  Recent Labs   Lab 01/30/19  0630 01/31/19  0608   * 132*   K 4.3 4.1    102   CO2 22* 25   BUN 16 16   CREATININE 0.7 0.6   CALCIUM 7.3* 7.4*   ALBUMIN 1.3* 1.3*   PROT 4.8* 4.8*   BILITOT 0.3 0.3   ALKPHOS 151* 320*   ALT 33 148*   * 373*       All pertinent labs within the past 24 hours have been reviewed.    Significant Imaging:  I have reviewed all pertinent imaging results/findings  within the past 24 hours.      ABG  Recent Labs   Lab 01/30/19  1111   PH 7.394   PO2 46*   PCO2 27.3*   HCO3 16.7*   BE -7     Assessment/Plan:     Pulmonary   Acute hypoxemic respiratory failure    --suspect 2/2 PCP. Appreciate ID assistance. Continue bactrim and steroids along with vancomycin, ceftriaxone for now. Also getting fungal coverage with fluconazole.   --intubation/bronch planned for today.   --in meantime, continue alternating Bipap 12/5, 50% with comfort flow (start at 30 L/min and 60% FiO2); titrate to maintain saturations 88-90%        Pneumonia, unspecified organism    --treating for PJP with bactrim  --azithromycin, rocephin and vancomycin as well  --concern for TB, AFB x3 ordered  --bronchoscopy on hold 2/2 high risk for complications due to increased oxygenation requirements and thrombocytopenia  --3% saline neb for sputum induction  --alternate BIPAP with high flow nasal cannula every 4 hours   --continue duo nebs q4         ID   * HIV disease    --new diagnosis, no disclosure to anyone, source of transmission unknown. Denies intercourse x 7 years, no IV drug use, no incarceration, no blood transfusions, no other sick contacts, he did get tattoos in last 7 years here in [place].  --continue bactrim, azithromycin, zosyn and vancomycin, oral diflucan   --CD4 count 10.7%  --ID following      Oncology   Pancytopenia     --appreciate Heme/Onc assistance.   --bone marrow suppression likely from acute infection vs HIV; less likely medication induced (on bactrim, famotidine; however, patient thrombocytopenic prior to receiving these meds)  --labs not indicative of hemolysis   --DIC unlikely as haptoglobin less than 150; but, ptt not prolonged  --no signs of bleeding at this time  --would only transfuse if Hgb <7, platelets <10k or platelets <50K with active bleeeing                Critical Care Time: 50 minutes  Critical secondary to Patient has a condition that poses threat to life and bodily function:  Acute hypoxemic respiratory failure      Critical care was time spent personally by me on the following activities: development of treatment plan with patient or surrogate and bedside caregivers, discussions with consultants, evaluation of patient's response to treatment, examination of patient, ordering and performing treatments and interventions, ordering and review of laboratory studies, ordering and review of radiographic studies, pulse oximetry, re-evaluation of patient's condition. This critical care time did not overlap with that of any other provider or involve time for any procedures.     Keaton Pritchett NP  Critical Care Medicine  [facility]

## 2019-01-31 NOTE — ASSESSMENT & PLAN NOTE
Not sure of cause- from ETOH? Vs other - + HIV/sepsis   Hepatitis panel negative  No abdominal pain and bilirubin not suggesting obstructive pattern or HIV cholangiopathy

## 2019-01-31 NOTE — ANESTHESIA PROCEDURE NOTES
Intubation    Diagnosis: respiratory failure  Doctor requesting consult: Dr. Powers  Patient location during procedure: ICU  Procedure start time: 1/31/2019 9:55 AM  Timeout: 1/31/2019 9:54 AM  Procedure end time: 1/31/2019 9:55 AM  Staffing  Resident/CRNA: Jemma Godinez CRNA  Performed: resident/CRNA   Preanesthetic Checklist  Completed: patient identified, surgical consent, pre-op evaluation, timeout performed, IV checked, risks and benefits discussed and monitors and equipment checked  Intubation  Indication: respiratory failure  Pre-oxygenation. Induction: intravenous, mask ventilation: easy mask.  Intubation: postinduction, laryngoscopy direct, Agustin 2.  Endotracheal Tube: 8.0 mm ID, cuffed (inflated to minimal occlusive pressure)  Attempts: 1, Grade I - full view of cords  Complicating Factors: none  Tube secured at 24 cm at the lips.  Findings post-intubation: bilateral breath sounds, atraumatic / condition of teeth unchanged  Position Confirmation: auscultation  Additional Notes  Per Dr. Powers, pt to stay intubated following bronchoscopy

## 2019-01-31 NOTE — ASSESSMENT & PLAN NOTE
--suspect 2/2 PCP. Appreciate ID assistance. Continue bactrim and steroids along with vancomycin, ceftriaxone for now. Also getting fungal coverage with fluconazole.   --intubation/bronch planned for today.   --in meantime, continue alternating Bipap 12/5, 50% with comfort flow (start at 30 L/min and 60% FiO2); titrate to maintain saturations 88-90%

## 2019-02-01 LAB
1,3 BETA GLUCAN SPEC-MCNC: 363 PG/ML
ACID FAST MOD KINY STN SPEC: NORMAL
ALBUMIN SERPL BCP-MCNC: 1.4 G/DL
ALLENS TEST: ABNORMAL
ALLENS TEST: ABNORMAL
ALP SERPL-CCNC: 264 U/L
ALT SERPL W/O P-5'-P-CCNC: 152 U/L
ANION GAP SERPL CALC-SCNC: 6 MMOL/L
AST SERPL-CCNC: 171 U/L
BASOPHILS # BLD AUTO: 0.01 K/UL
BASOPHILS NFR BLD: 0.4 %
BILIRUB SERPL-MCNC: 0.3 MG/DL
BLD PROD TYP BPU: NORMAL
BLOOD UNIT EXPIRATION DATE: NORMAL
BLOOD UNIT TYPE CODE: 5100
BLOOD UNIT TYPE: NORMAL
BUN SERPL-MCNC: 16 MG/DL
CALCIUM SERPL-MCNC: 7.6 MG/DL
CHLORIDE SERPL-SCNC: 105 MMOL/L
CO2 SERPL-SCNC: 25 MMOL/L
CODING SYSTEM: NORMAL
CREAT SERPL-MCNC: 0.7 MG/DL
DELSYS: ABNORMAL
DELSYS: ABNORMAL
DIFFERENTIAL METHOD: ABNORMAL
DISPENSE STATUS: NORMAL
EOSINOPHIL # BLD AUTO: 0 K/UL
EOSINOPHIL NFR BLD: 0 %
ERYTHROCYTE [DISTWIDTH] IN BLOOD BY AUTOMATED COUNT: 15 %
ERYTHROCYTE [SEDIMENTATION RATE] IN BLOOD BY WESTERGREN METHOD: 15 MM/H
EST. GFR  (AFRICAN AMERICAN): >60 ML/MIN/1.73 M^2
EST. GFR  (NON AFRICAN AMERICAN): >60 ML/MIN/1.73 M^2
FIO2: 40
FIO2: 40
GLUCOSE SERPL-MCNC: 152 MG/DL
HCO3 UR-SCNC: 21 MMOL/L (ref 24–28)
HCO3 UR-SCNC: 22.8 MMOL/L (ref 24–28)
HCT VFR BLD AUTO: 24.3 %
HGB BLD-MCNC: 8 G/DL
LACTATE SERPL-SCNC: 3.4 MMOL/L
LACTATE SERPL-SCNC: 3.8 MMOL/L
LYMPHOCYTES # BLD AUTO: 0.3 K/UL
LYMPHOCYTES NFR BLD: 10.2 %
MCH RBC QN AUTO: 25 PG
MCHC RBC AUTO-ENTMCNC: 32.9 G/DL
MCV RBC AUTO: 76 FL
MIN VOL: 20
MIN VOL: 7.4
MODE: ABNORMAL
MODE: ABNORMAL
MONOCYTES # BLD AUTO: 0.1 K/UL
MONOCYTES NFR BLD: 2.1 %
NEUTROPHILS # BLD AUTO: 2.5 K/UL
NEUTROPHILS NFR BLD: 87.3 %
PCO2 BLDA: 31.2 MMHG (ref 35–45)
PCO2 BLDA: 36.7 MMHG (ref 35–45)
PEEP: 5
PEEP: 5
PH SMN: 7.4 [PH] (ref 7.35–7.45)
PH SMN: 7.44 [PH] (ref 7.35–7.45)
PIP: 12
PLATELET # BLD AUTO: 8 K/UL
PLATELET BLD QL SMEAR: ABNORMAL
PMV BLD AUTO: ABNORMAL FL
PO2 BLDA: 76 MMHG (ref 80–100)
PO2 BLDA: 81 MMHG (ref 80–100)
POC BE: -2 MMOL/L
POC BE: -3 MMOL/L
POC SATURATED O2: 95 % (ref 95–100)
POC SATURATED O2: 96 % (ref 95–100)
POC TCO2: 22 MMOL/L (ref 23–27)
POC TCO2: 24 MMOL/L (ref 23–27)
POTASSIUM SERPL-SCNC: 4.6 MMOL/L
PROT SERPL-MCNC: 4.8 G/DL
PS: 5
RBC # BLD AUTO: 3.2 M/UL
RPR SER QL: NORMAL
SAMPLE: ABNORMAL
SAMPLE: ABNORMAL
SITE: ABNORMAL
SITE: ABNORMAL
SODIUM SERPL-SCNC: 136 MMOL/L
SP02: 100
SP02: 93
TRANS PLATPHERESIS VOL PATIENT: NORMAL ML
VT: 430
WBC # BLD AUTO: 2.83 K/UL

## 2019-02-01 PROCEDURE — 25000003 PHARM REV CODE 250: Performed by: EMERGENCY MEDICINE

## 2019-02-01 PROCEDURE — 83605 ASSAY OF LACTIC ACID: CPT

## 2019-02-01 PROCEDURE — 36600 WITHDRAWAL OF ARTERIAL BLOOD: CPT

## 2019-02-01 PROCEDURE — 27000190 HC CPAP FULL FACE MASK W/VALVE

## 2019-02-01 PROCEDURE — 94761 N-INVAS EAR/PLS OXIMETRY MLT: CPT

## 2019-02-01 PROCEDURE — 94660 CPAP INITIATION&MGMT: CPT

## 2019-02-01 PROCEDURE — 99233 PR SUBSEQUENT HOSPITAL CARE,LEVL III: ICD-10-PCS | Mod: ,,, | Performed by: INTERNAL MEDICINE

## 2019-02-01 PROCEDURE — 80053 COMPREHEN METABOLIC PANEL: CPT

## 2019-02-01 PROCEDURE — 63600175 PHARM REV CODE 636 W HCPCS: Performed by: INTERNAL MEDICINE

## 2019-02-01 PROCEDURE — 99291 PR CRITICAL CARE, E/M 30-74 MINUTES: ICD-10-PCS | Mod: ,,, | Performed by: INTERNAL MEDICINE

## 2019-02-01 PROCEDURE — 82955 ASSAY OF G6PD ENZYME: CPT

## 2019-02-01 PROCEDURE — 25000242 PHARM REV CODE 250 ALT 637 W/ HCPCS: Performed by: NURSE PRACTITIONER

## 2019-02-01 PROCEDURE — 85025 COMPLETE CBC W/AUTO DIFF WBC: CPT

## 2019-02-01 PROCEDURE — 36430 TRANSFUSION BLD/BLD COMPNT: CPT

## 2019-02-01 PROCEDURE — P9035 PLATELET PHERES LEUKOREDUCED: HCPCS

## 2019-02-01 PROCEDURE — 82803 BLOOD GASES ANY COMBINATION: CPT

## 2019-02-01 PROCEDURE — 99900026 HC AIRWAY MAINTENANCE (STAT)

## 2019-02-01 PROCEDURE — 94640 AIRWAY INHALATION TREATMENT: CPT

## 2019-02-01 PROCEDURE — 20000000 HC ICU ROOM

## 2019-02-01 PROCEDURE — 25000003 PHARM REV CODE 250: Performed by: INTERNAL MEDICINE

## 2019-02-01 PROCEDURE — 99900035 HC TECH TIME PER 15 MIN (STAT)

## 2019-02-01 PROCEDURE — 27200966 HC CLOSED SUCTION SYSTEM

## 2019-02-01 PROCEDURE — 83605 ASSAY OF LACTIC ACID: CPT | Mod: 91

## 2019-02-01 PROCEDURE — 99291 CRITICAL CARE FIRST HOUR: CPT | Mod: ,,, | Performed by: INTERNAL MEDICINE

## 2019-02-01 PROCEDURE — 36415 COLL VENOUS BLD VENIPUNCTURE: CPT

## 2019-02-01 PROCEDURE — S0039 INJECTION, SULFAMETHOXAZOLE: HCPCS | Performed by: INTERNAL MEDICINE

## 2019-02-01 PROCEDURE — 99233 SBSQ HOSP IP/OBS HIGH 50: CPT | Mod: ,,, | Performed by: INTERNAL MEDICINE

## 2019-02-01 PROCEDURE — 25000003 PHARM REV CODE 250: Performed by: PHYSICIAN ASSISTANT

## 2019-02-01 RX ORDER — FLUCONAZOLE 2 MG/ML
400 INJECTION, SOLUTION INTRAVENOUS
Status: DISCONTINUED | OUTPATIENT
Start: 2019-02-01 | End: 2019-02-04

## 2019-02-01 RX ORDER — DEXMEDETOMIDINE HYDROCHLORIDE 4 UG/ML
0.2 INJECTION, SOLUTION INTRAVENOUS CONTINUOUS
Status: DISCONTINUED | OUTPATIENT
Start: 2019-02-01 | End: 2019-02-03

## 2019-02-01 RX ORDER — HYDROCODONE BITARTRATE AND ACETAMINOPHEN 500; 5 MG/1; MG/1
TABLET ORAL
Status: DISCONTINUED | OUTPATIENT
Start: 2019-02-01 | End: 2019-02-03

## 2019-02-01 RX ORDER — OLANZAPINE 10 MG/2ML
5 INJECTION, POWDER, FOR SOLUTION INTRAMUSCULAR 3 TIMES DAILY PRN
Status: DISCONTINUED | OUTPATIENT
Start: 2019-02-01 | End: 2019-02-03

## 2019-02-01 RX ADMIN — IPRATROPIUM BROMIDE AND ALBUTEROL SULFATE 3 ML: .5; 3 SOLUTION RESPIRATORY (INHALATION) at 04:02

## 2019-02-01 RX ADMIN — DEXMEDETOMIDINE HYDROCHLORIDE 0.2 MCG/KG/HR: 4 INJECTION, SOLUTION INTRAVENOUS at 10:02

## 2019-02-01 RX ADMIN — IPRATROPIUM BROMIDE AND ALBUTEROL SULFATE 3 ML: .5; 3 SOLUTION RESPIRATORY (INHALATION) at 11:02

## 2019-02-01 RX ADMIN — FLUCONAZOLE, SODIUM CHLORIDE 400 MG: 2 INJECTION INTRAVENOUS at 01:02

## 2019-02-01 RX ADMIN — METHYLPREDNISOLONE SODIUM SUCCINATE 60 MG: 125 INJECTION, POWDER, FOR SOLUTION INTRAMUSCULAR; INTRAVENOUS at 12:02

## 2019-02-01 RX ADMIN — FAMOTIDINE 20 MG: 20 TABLET ORAL at 09:02

## 2019-02-01 RX ADMIN — METHYLPREDNISOLONE SODIUM SUCCINATE 60 MG: 125 INJECTION, POWDER, FOR SOLUTION INTRAMUSCULAR; INTRAVENOUS at 05:02

## 2019-02-01 RX ADMIN — FLUCONAZOLE 400 MG: 100 TABLET ORAL at 08:02

## 2019-02-01 RX ADMIN — IPRATROPIUM BROMIDE AND ALBUTEROL SULFATE 3 ML: .5; 3 SOLUTION RESPIRATORY (INHALATION) at 07:02

## 2019-02-01 RX ADMIN — METHYLPREDNISOLONE SODIUM SUCCINATE 60 MG: 125 INJECTION, POWDER, FOR SOLUTION INTRAMUSCULAR; INTRAVENOUS at 06:02

## 2019-02-01 RX ADMIN — SULFAMETHOXAZOLE AND TRIMETHOPRIM 295 MG: 80; 16 INJECTION, SOLUTION, CONCENTRATE INTRAVENOUS at 01:02

## 2019-02-01 RX ADMIN — FAMOTIDINE 20 MG: 20 TABLET ORAL at 08:02

## 2019-02-01 RX ADMIN — SULFAMETHOXAZOLE AND TRIMETHOPRIM 295 MG: 80; 16 INJECTION, SOLUTION, CONCENTRATE INTRAVENOUS at 05:02

## 2019-02-01 RX ADMIN — SULFAMETHOXAZOLE AND TRIMETHOPRIM 295 MG: 80; 16 INJECTION, SOLUTION, CONCENTRATE INTRAVENOUS at 09:02

## 2019-02-01 RX ADMIN — METHYLPREDNISOLONE SODIUM SUCCINATE 60 MG: 125 INJECTION, POWDER, FOR SOLUTION INTRAMUSCULAR; INTRAVENOUS at 01:02

## 2019-02-01 NOTE — SUBJECTIVE & OBJECTIVE
Interval History: no acute issue.  Currently on 40% fio2.  Alert and interactive off sedation.      Objective:     Vital Signs (Most Recent):  Temp: 97.6 °F (36.4 °C) (02/01/19 0800)  Pulse: 80 (02/01/19 0900)  Resp: (!) 24 (02/01/19 0900)  BP: (!) 95/57 (02/01/19 0900)  SpO2: (!) 93 % (02/01/19 0900) Vital Signs (24h Range):  Temp:  [96.7 °F (35.9 °C)-98.5 °F (36.9 °C)] 97.6 °F (36.4 °C)  Pulse:  [] 80  Resp:  [12-34] 24  SpO2:  [92 %-100 %] 93 %  BP: ()/(49-93) 95/57     Weight: 59.6 kg (131 lb 6.3 oz)  Body mass index is 21.21 kg/m².      Intake/Output Summary (Last 24 hours) at 2/1/2019 0959  Last data filed at 2/1/2019 0908  Gross per 24 hour   Intake 890.08 ml   Output 1610 ml   Net -719.92 ml       Physical Exam   Constitutional: He has a sickly appearance. He is intubated.   emanciated   HENT:   Head: Normocephalic.   intubated   Eyes: Conjunctivae and EOM are normal. Pupils are equal, round, and reactive to light. No scleral icterus.   Cardiovascular: Normal rate, regular rhythm and normal heart sounds.   No murmur heard.  Pulmonary/Chest: Effort normal and breath sounds normal. He is intubated. He has no wheezes. He has no rhonchi.   Abdominal: Soft. Bowel sounds are normal. There is no hepatosplenomegaly. There is no tenderness. There is no guarding.   Musculoskeletal: He exhibits no edema.   Neurological:   sedated   Skin: No rash noted. No erythema.       Vents:  Vent Mode: PRVC (02/01/19 0507)  Ventilator Initiated: Yes (01/31/19 1000)  Set Rate: 15 bmp (02/01/19 0700)  Vt Set: 430 mL (02/01/19 0700)  Pressure Support: 7 cmH20 (01/30/19 1111)  PEEP/CPAP: 5 cmH20 (02/01/19 0700)  Oxygen Concentration (%): 40 (02/01/19 0900)  Peak Airway Pressure: 12.9 cmH2O (02/01/19 0700)  Total Ve: 8.9 mL (02/01/19 0700)  F/VT Ratio<105 (RSBI): 178.86 (02/01/19 0700)    Lines/Drains/Airways     Drain                 NG/OG Tube 01/31/19 1115 [place] sump 12 Fr. Right mouth less than 1 day         Urethral  Catheter 01/31/19 1010 Non-latex less than 1 day          Airway                 Airway - Non-Surgical 01/31/19 0955 Endotracheal Tube 1 day          Peripheral Intravenous Line                 Peripheral IV - Single Lumen 01/31/19 1243 Right Upper Arm less than 1 day         Peripheral IV - Single Lumen 01/31/19 1244 Anterior;Proximal;Right Forearm less than 1 day                Significant Labs:    CBC/Anemia Profile:  Recent Labs   Lab 01/30/19  1024 01/30/19  1358 01/31/19  0608 02/01/19  0621   WBC 2.97* 3.03* 2.62* 2.83*   HGB 9.2* 9.8* 8.2* 8.0*   HCT 27.5* 29.2* 24.7* 24.3*   PLT 20* 22* 22* 8*   MCV 74* 74* 74* 76*   RDW 14.7* 14.6* 14.5 15.0*   IRON 73  --   --   --    RETIC 0.8  --   --   --    [id number] 1035*  --   --   --         Chemistries:  Recent Labs   Lab 01/31/19  0608 02/01/19  0621   * 136   K 4.1 4.6    105   CO2 25 25   BUN 16 16   CREATININE 0.6 0.7   CALCIUM 7.4* 7.6*   ALBUMIN 1.3* 1.4*   PROT 4.8* 4.8*   BILITOT 0.3 0.3   ALKPHOS 320* 264*   * 152*   * 171*       ABGs:   Recent Labs   Lab 02/01/19  0431   PH 7.401   PCO2 36.7   HCO3 22.8*   POCSATURATED 95   BE -2       Significant Imaging:  CXR: I have reviewed all pertinent results/findings within the past 24 hours and my personal findings are:  no acute changes

## 2019-02-01 NOTE — CARE UPDATE
Pt is on vent settings rr 15.vt 430,peep 5,fio2 60%. Ambu bag is at HOB. All alarms are working and functioning. Pt is resting.

## 2019-02-01 NOTE — PROGRESS NOTES
Results for CHARLIE MCKEON (MRN [id number]) as of 2/1/2019 13:09   Ref. Range 2/1/2019 10:15   POC PH Latest Ref Range: 7.35 - 7.45  7.436   POC PCO2 Latest Ref Range: 35 - 45 mmHg 31.2 (L)   POC PO2 Latest Ref Range: 80 - 100 mmHg 81   POC BE Latest Ref Range: -2 to 2 mmol/L -3   POC HCO3 Latest Ref Range: 24 - 28 mmol/L 21.0 (L)   POC SATURATED O2 Latest Ref Range: 95 - 100 % 96   POC TCO2 Latest Ref Range: 23 - 27 mmol/L 22 (L)   FiO2 Unknown 40   PEEP Unknown 5   Sample Unknown ARTERIAL   DelSys Unknown Adult Vent   Allens Test Unknown Pass   Site Unknown RR   Mode Unknown CPAP

## 2019-02-01 NOTE — SUBJECTIVE & OBJECTIVE
Interval History: *Pt extubated. Pt comfortable eating lunch. Pt alert    Oncology Treatment Plan:   [No treatment plan]    Medications:  Continuous Infusions:   dexmedetomidine (PRECEDEX) infusion Stopped (02/01/19 1114)    fentanyl Stopped (02/01/19 1004)    propofol Stopped (02/01/19 1030)     Scheduled Meds:   albuterol-ipratropium  3 mL Nebulization Q4H    famotidine  20 mg Oral BID    fluconazole (DIFLUCAN) IVPB  400 mg Intravenous Q24H    methylPREDNISolone sodium succinate  60 mg Intravenous Q6H    trimethoprim-sulfamethoxasole (BACTRIM) IVPB (custom)  295 mg Intravenous Q8H     PRN Meds:acetaminophen, OLANZapine, ondansetron       Objective:     Vital Signs (Most Recent):  Temp: 97.6 °F (36.4 °C) (02/01/19 0800)  Pulse: (!) 149 (02/01/19 1030)  Resp: (!) 34 (02/01/19 1030)  BP: 108/79 (02/01/19 1000)  SpO2: (!) 88 % (02/01/19 1030) Vital Signs (24h Range):  Temp:  [97.5 °F (36.4 °C)-98.5 °F (36.9 °C)] 97.6 °F (36.4 °C)  Pulse:  [] 149  Resp:  [12-34] 34  SpO2:  [88 %-100 %] 88 %  BP: ()/(51-79) 108/79     Weight: 59.6 kg (131 lb 6.3 oz)  Body mass index is 21.21 kg/m².  Body surface area is 1.67 meters squared.      Intake/Output Summary (Last 24 hours) at 2/1/2019 1428  Last data filed at 2/1/2019 0908  Gross per 24 hour   Intake 568.33 ml   Output 1310 ml   Net -741.67 ml       Physical Exam   Constitutional: He has a sickly appearance.   HENT:   Head: Normocephalic.   Eyes: Conjunctivae are normal. No scleral icterus.   Cardiovascular: Normal rate, regular rhythm and normal heart sounds.   No murmur heard.  Pulmonary/Chest: Effort normal and breath sounds normal. He has no wheezes. He has no rhonchi.   Abdominal: Soft. Bowel sounds are normal. There is no hepatosplenomegaly. There is no tenderness. There is no guarding.   Musculoskeletal: He exhibits no edema.   Neurological: He is alert.   Skin: No rash noted. No erythema.       Significant Labs:   All pertinent labs within the past  24 hours have been reviewed    Diagnostic Results:  I have reviewed and interpreted all pertinent imaging results/findings within the past 24 hours

## 2019-02-01 NOTE — ASSESSMENT & PLAN NOTE
With hypoxia and sepsis. Will have broad spectrum Abx + treatment for PJP.  +/- steroids. Will discuss with ID.   Rocephin dc'd by ID  Bactrim - probable PJP - concern with low platelets that alternative may be indicated if continues to drop - appreciate hematology input and recs   pulm consulted - should have bronch for PJP, TB concerns  Wean oxygen as tolerated  Vent management per pulmonary

## 2019-02-01 NOTE — PROGRESS NOTES
Pt extubated. Calm during extubation. Shortly after, pt very agitated, attempted to climb out bed and screaming for the police. Security called and MD Powers at bedside. Ukrainian speaking interpretors at bedside for optimal communication. Pt able to state name and where he is at. Requesting to go home immediately.

## 2019-02-01 NOTE — ASSESSMENT & PLAN NOTE
--appreciate Heme/Onc assistance.   -?bone marrow suppression from acute infection vs HIV vs medication induced (bactrim, famotidine for thrombocytopenia...however, patient was thrombocytopenic prior to receiving these medications)  --no signs of bleeding at this time  --would only transfuse if Hgb <7, platelets <10k or platelets <50K with active bleeeing.  Would recommend platelet transfusion.

## 2019-02-01 NOTE — PROGRESS NOTES
Patient extubated. Patient refusing oxygen delivery devices and very agitated. Patient sat on room air is 94%.

## 2019-02-01 NOTE — SUBJECTIVE & OBJECTIVE
Interval History: await bronch results     Review of Systems   Unable to perform ROS: Intubated     Objective:     Vital Signs (Most Recent):  Temp: 97.5 °F (36.4 °C) (01/31/19 1530)  Pulse: 78 (01/31/19 2126)  Resp: 20 (01/31/19 2126)  BP: (!) 92/54 (01/31/19 2126)  SpO2: 97 % (01/31/19 2126) Vital Signs (24h Range):  Temp:  [96.7 °F (35.9 °C)-98.7 °F (37.1 °C)] 97.5 °F (36.4 °C)  Pulse:  [] 78  Resp:  [12-34] 20  SpO2:  [84 %-100 %] 97 %  BP: ()/(49-93) 92/54     Weight: 59.8 kg (131 lb 13.4 oz)  Body mass index is 21.28 kg/m².    Intake/Output Summary (Last 24 hours) at 1/31/2019 2144  Last data filed at 1/31/2019 1800  Gross per 24 hour   Intake 632.61 ml   Output 1785 ml   Net -1152.39 ml      Physical Exam   Constitutional: He is oriented to person, place, and time. Vital signs are normal. He appears cachectic. He is cooperative. He has a sickly appearance. No distress. Nasal cannula in place.   HENT:   Head: Normocephalic and atraumatic.   Eyes: Conjunctivae are normal. Pupils are equal, round, and reactive to light. Right eye exhibits no exudate. Left eye exhibits no exudate. Right conjunctiva has no hemorrhage. Left conjunctiva has no hemorrhage. No scleral icterus. Right eye exhibits no nystagmus. Left eye exhibits no nystagmus.   Neck: Trachea normal. No neck rigidity. No tracheal deviation present.   Cardiovascular: Normal rate, regular rhythm and normal heart sounds. PMI is not displaced. Exam reveals no gallop and no friction rub.   No murmur heard.  Pulses:       Radial pulses are 2+ on the right side, and 2+ on the left side.        Dorsalis pedis pulses are 1+ on the right side, and 1+ on the left side.   Pulmonary/Chest: No accessory muscle usage. Tachypnea noted. He is in respiratory distress. He has no wheezes. He has rhonchi. He has rales in the right middle field, the right lower field, the left middle field and the left lower field.   Abdominal: Soft. Normal appearance and bowel  sounds are normal. There is no tenderness.   Musculoskeletal: Normal range of motion.   Neurological: He is alert and oriented to person, place, and time. No cranial nerve deficit or sensory deficit. GCS eye subscore is 4. GCS verbal subscore is 5. GCS motor subscore is 6.   Skin: Skin is warm and dry. Capillary refill takes 2 to 3 seconds. He is not diaphoretic. No cyanosis. Nails show no clubbing.   Psychiatric: He has a normal mood and affect. His behavior is normal.   Nursing note and vitals reviewed.      Significant Labs:   Blood Culture: No results for input(s): LABBLOO in the last 48 hours.  BMP:   Recent Labs   Lab 01/31/19  0608   *   *   K 4.1      CO2 25   BUN 16   CREATININE 0.6   CALCIUM 7.4*     CBC:   Recent Labs   Lab 01/30/19  1024 01/30/19  1358 01/31/19  0608   WBC 2.97* 3.03* 2.62*   HGB 9.2* 9.8* 8.2*   HCT 27.5* 29.2* 24.7*   PLT 20* 22* 22*     POCT Glucose: No results for input(s): POCTGLUCOSE in the last 48 hours.  Respiratory Culture:   Recent Labs   Lab 01/31/19  1035   GSRESP <10 epithelial cells per low power field.  Many WBC's  Many budding yeast     Urine Culture: No results for input(s): LABURIN in the last 48 hours.    Significant Imaging: I have reviewed all pertinent imaging results/findings within the past 24 hours.

## 2019-02-01 NOTE — SIGNIFICANT EVENT
Patient was agitated briefly after extubation.  After multiple discussion, patient agreed to continue his care with [facility].  afb smear from bal was negative.  Culture still pending.  pcp silver stain still pending.  In light of negative afb smear, negative pressure room is not required.

## 2019-02-01 NOTE — PLAN OF CARE
Problem: Adult Inpatient Plan of Care  Goal: Plan of Care Review  Outcome: Ongoing (interventions implemented as appropriate)  Pt remains in ICU. Extubated today, shortly after extubation pt became very agitated. Once plan of care was communicated to patient and he understood fully, he was more cooperative and in agreement to stay and be treated with medication, labs, oxygen, etc. Tolerating regular diet. Dick removed, voiding adequately. One unit Platelets given due to PLT count of 8 this morning. Wearing Bipap at his request otherwise on hiflo nasal cannula. Remains free from fall, injury, or breakdown throughout shift.

## 2019-02-01 NOTE — ASSESSMENT & PLAN NOTE
pancytopenia during hospitalization likely related to sepsis/infection and/or drug-induced resulting in marrow suppression/infiltration. Pt may have bm infiltration from opportunistic infections.   Extensive workup pending including workup for deficiency states  Unremarkable and No  evidence of hemolysis.       Review of peripheral smear reveals anemia with hypochromic microcytosis, scattered schistocytes and spherocytes.  Medications such as bactrim known SE of thrombocytopenia.   Worsening thrombocytopenia- no bleeding  Plan plt transfusion   Coagulopathy resolved  Cont to monitor

## 2019-02-01 NOTE — PROGRESS NOTES
[facility]  Hematology/Oncology  Progress Note    Patient Name: Lior Prado  Admission Date: 1/28/2019  Hospital Length of Stay: 4 days  Code Status: Full Code     Subjective:     HPI:  Pt is a Peruvian speaking 44 y/o male with no pmhx presented with generalized weakness and increasing SOB and cough x 1 week.  No hemoptysis/chest pain. Patient reports he has been losing weight.  He also reports fevers night sweats for the past week.  In the ED patient was also diagnosed with thrush.  He is HIV positive.  He was started on treatment for  PJP pneumonia. Pt in ICU for 02 requirements secondary to hypoxia.  CBC on admit 01/28/2018 reveals white blood cell count of 3.57 hemoglobin of 10.5 grams/deciliter hematocrit 30.7% and a platelet count of 75k.  This platelet count trending during hospitalization to 12 K.  Patient has undergone platelet transfusion with recent platelet counts of 20 K. No active bleeding.  Consulted for thrombocytopenia.    Interval History: *Pt extubated. Pt comfortable eating lunch. Pt alert    Oncology Treatment Plan:   [No treatment plan]    Medications:  Continuous Infusions:   dexmedetomidine (PRECEDEX) infusion Stopped (02/01/19 1114)    fentanyl Stopped (02/01/19 1004)    propofol Stopped (02/01/19 1030)     Scheduled Meds:   albuterol-ipratropium  3 mL Nebulization Q4H    famotidine  20 mg Oral BID    fluconazole (DIFLUCAN) IVPB  400 mg Intravenous Q24H    methylPREDNISolone sodium succinate  60 mg Intravenous Q6H    trimethoprim-sulfamethoxasole (BACTRIM) IVPB (custom)  295 mg Intravenous Q8H     PRN Meds:acetaminophen, OLANZapine, ondansetron       Objective:     Vital Signs (Most Recent):  Temp: 97.6 °F (36.4 °C) (02/01/19 0800)  Pulse: (!) 149 (02/01/19 1030)  Resp: (!) 34 (02/01/19 1030)  BP: 108/79 (02/01/19 1000)  SpO2: (!) 88 % (02/01/19 1030) Vital Signs (24h Range):  Temp:  [97.5 °F (36.4 °C)-98.5 °F (36.9 °C)] 97.6 °F (36.4 °C)  Pulse:   [] 149  Resp:  [12-34] 34  SpO2:  [88 %-100 %] 88 %  BP: ()/(51-79) 108/79     Weight: 59.6 kg (131 lb 6.3 oz)  Body mass index is 21.21 kg/m².  Body surface area is 1.67 meters squared.      Intake/Output Summary (Last 24 hours) at 2/1/2019 1428  Last data filed at 2/1/2019 0908  Gross per 24 hour   Intake 568.33 ml   Output 1310 ml   Net -741.67 ml       Physical Exam   Constitutional: He has a sickly appearance.   HENT:   Head: Normocephalic.   Eyes: Conjunctivae are normal. No scleral icterus.   Cardiovascular: Normal rate, regular rhythm and normal heart sounds.   No murmur heard.  Pulmonary/Chest: Effort normal and breath sounds normal. He has no wheezes. He has no rhonchi.   Abdominal: Soft. Bowel sounds are normal. There is no hepatosplenomegaly. There is no tenderness. There is no guarding.   Musculoskeletal: He exhibits no edema.   Neurological: He is alert.   Skin: No rash noted. No erythema.       Significant Labs:   All pertinent labs within the past 24 hours have been reviewed    Diagnostic Results:  I have reviewed and interpreted all pertinent imaging results/findings within the past 24 hours    Assessment/Plan:     * HIV disease    Followed by ID   CD4 count 11  Pt undergoing IV Bactrim for PJP pneumonia prophylaxis.   He is undergoing ceftriaxone empirically for CAP     Acute hypoxemic respiratory failure    Pulm following  Pt treated with  antibiotics including bactrim and  steroids for suspected PJP and PNA  concerns for TB  CTA shows 1.6 cm cavitation lesion of RUL  S/p FOB  - cultures pending  Pt extubated       Pancytopenia     pancytopenia during hospitalization likely related to sepsis/infection and/or drug-induced resulting in marrow suppression/infiltration. Pt may have bm infiltration from opportunistic infections.   Extensive workup pending including workup for deficiency states  Unremarkable and No  evidence of hemolysis.       Review of peripheral smear reveals anemia with  hypochromic microcytosis, scattered schistocytes and spherocytes.  Medications such as bactrim known SE of thrombocytopenia.   Worsening thrombocytopenia- no bleeding  Plan plt transfusion   Coagulopathy resolved  Cont to monitor             Caro Rendon MD  Hematology/Oncology  [facility] [place]

## 2019-02-01 NOTE — ASSESSMENT & PLAN NOTE
--suspect 2/2 PCP. Appreciate ID assistance. Continue bactrim and steroids along with vancomycin, ceftriaxone for now. Also getting fungal coverage with fluconazole.   --s.p fob.  Await culture result.    --will sbt with plan to extubate to comfort flow.

## 2019-02-01 NOTE — ASSESSMENT & PLAN NOTE
Secondary to probable CAP and/or PJP, cannot rule out TB  Appreciate pulmonology recs   S/p bronch  IV steroids, nebs,antibiotic

## 2019-02-01 NOTE — PLAN OF CARE
02/01/19 1644   Discharge Assessment   Assessment Type Discharge Planning Assessment   Confirmed/corrected address and phone number on facesheet? Yes   Assessment information obtained from? Patient   Prior to hospitilization cognitive status: Alert/Oriented   Prior to hospitalization functional status: Independent   Current cognitive status: Alert/Oriented   Current Functional Status: Independent   Facility Arrived From: Home    Lives With friend(s)   Able to Return to Prior Arrangements other (see comments)  (TBD)   Is patient able to care for self after discharge? Unable to determine at this time (comments)   Who are your caregiver(s) and their phone number(s)? Pt provided a number but no name [phone]   Patient's perception of discharge disposition admitted as an inpatient   Readmission Within the Last 30 Days no previous admission in last 30 days   Patient currently being followed by outpatient case management? No   Patient currently receives any other outside agency services? No   Equipment Currently Used at Home none   Do you have any problems affording any of your prescribed medications? TBD   Is the patient taking medications as prescribed? (Per pt, he is not on any home medications. )   Does the patient have transportation home? Yes   Transportation Anticipated family or friend will provide;car, drives self   Dialysis Name and Scheduled days N/A    Does the patient receive services at the [facility]? No   Discharge Plan A Home   Discharge Plan B Other  (TBD)   DME Needed Upon Discharge  other (see comments)  (TBD)   Patient/Family in Agreement with Plan yes     SW to pt's room to discuss discharge planning and help at home. Pt Danish speaking only, SW used [employer]. Interpretors name, Bebe, and ID number [id number]. Pt explains via interpretor, he is from [place], he is here illegally, and he has a wife in [place]. Pt explains he is here for work, he lives with friends. Per pt, if or when he discharges,  he wish to return to his home with friends.

## 2019-02-01 NOTE — PROGRESS NOTES
[facility]  Pulmonology  Progress Note    Patient Name: Lior Prado  MRN: [id number]  Admission Date: 1/28/2019  Hospital Length of Stay: 4 days  Code Status: Full Code  Attending Provider: Nhung Carter MD  Primary Care Provider: Primary Doctor No   Principal Problem: HIV disease    Subjective:     Interval History: no acute issue.  Currently on 40% fio2.  Alert and interactive off sedation.      Objective:     Vital Signs (Most Recent):  Temp: 97.6 °F (36.4 °C) (02/01/19 0800)  Pulse: 80 (02/01/19 0900)  Resp: (!) 24 (02/01/19 0900)  BP: (!) 95/57 (02/01/19 0900)  SpO2: (!) 93 % (02/01/19 0900) Vital Signs (24h Range):  Temp:  [96.7 °F (35.9 °C)-98.5 °F (36.9 °C)] 97.6 °F (36.4 °C)  Pulse:  [] 80  Resp:  [12-34] 24  SpO2:  [92 %-100 %] 93 %  BP: ()/(49-93) 95/57     Weight: 59.6 kg (131 lb 6.3 oz)  Body mass index is 21.21 kg/m².      Intake/Output Summary (Last 24 hours) at 2/1/2019 0959  Last data filed at 2/1/2019 0908  Gross per 24 hour   Intake 890.08 ml   Output 1610 ml   Net -719.92 ml       Physical Exam   Constitutional: He has a sickly appearance. He is intubated.   emanciated   HENT:   Head: Normocephalic.   intubated   Eyes: Conjunctivae and EOM are normal. Pupils are equal, round, and reactive to light. No scleral icterus.   Cardiovascular: Normal rate, regular rhythm and normal heart sounds.   No murmur heard.  Pulmonary/Chest: Effort normal and breath sounds normal. He is intubated. He has no wheezes. He has no rhonchi.   Abdominal: Soft. Bowel sounds are normal. There is no hepatosplenomegaly. There is no tenderness. There is no guarding.   Musculoskeletal: He exhibits no edema.   Neurological:   sedated   Skin: No rash noted. No erythema.       Vents:  Vent Mode: PRVC (02/01/19 0507)  Ventilator Initiated: Yes (01/31/19 1000)  Set Rate: 15 bmp (02/01/19 0700)  Vt Set: 430 mL (02/01/19 0700)  Pressure Support: 7 cmH20 (01/30/19 1111)  PEEP/CPAP: 5 cmH20  (02/01/19 0700)  Oxygen Concentration (%): 40 (02/01/19 0900)  Peak Airway Pressure: 12.9 cmH2O (02/01/19 0700)  Total Ve: 8.9 mL (02/01/19 0700)  F/VT Ratio<105 (RSBI): 178.86 (02/01/19 0700)    Lines/Drains/Airways     Drain                 NG/OG Tube 01/31/19 1115 [place] sump 12 Fr. Right mouth less than 1 day         Urethral Catheter 01/31/19 1010 Non-latex less than 1 day          Airway                 Airway - Non-Surgical 01/31/19 0955 Endotracheal Tube 1 day          Peripheral Intravenous Line                 Peripheral IV - Single Lumen 01/31/19 1243 Right Upper Arm less than 1 day         Peripheral IV - Single Lumen 01/31/19 1244 Anterior;Proximal;Right Forearm less than 1 day                Significant Labs:    CBC/Anemia Profile:  Recent Labs   Lab 01/30/19  1024 01/30/19  1358 01/31/19  0608 02/01/19  0621   WBC 2.97* 3.03* 2.62* 2.83*   HGB 9.2* 9.8* 8.2* 8.0*   HCT 27.5* 29.2* 24.7* 24.3*   PLT 20* 22* 22* 8*   MCV 74* 74* 74* 76*   RDW 14.7* 14.6* 14.5 15.0*   IRON 73  --   --   --    RETIC 0.8  --   --   --    [id number] 1035*  --   --   --         Chemistries:  Recent Labs   Lab 01/31/19  0608 02/01/19  0621   * 136   K 4.1 4.6    105   CO2 25 25   BUN 16 16   CREATININE 0.6 0.7   CALCIUM 7.4* 7.6*   ALBUMIN 1.3* 1.4*   PROT 4.8* 4.8*   BILITOT 0.3 0.3   ALKPHOS 320* 264*   * 152*   * 171*       ABGs:   Recent Labs   Lab 02/01/19  0431   PH 7.401   PCO2 36.7   HCO3 22.8*   POCSATURATED 95   BE -2       Significant Imaging:  CXR: I have reviewed all pertinent results/findings within the past 24 hours and my personal findings are:  no acute changes    Assessment/Plan:     * HIV disease    Per id.  Situation is difficult due to social barriers.       Acute hypoxemic respiratory failure    --suspect 2/2 PCP. Appreciate ID assistance. Continue bactrim and steroids along with vancomycin, ceftriaxone for now. Also getting fungal coverage with fluconazole.   --s.p fob.  Await  culture result.    --will sbt with plan to extubate to comfort flow.        Pancytopenia    --appreciate Heme/Onc assistance.   -?bone marrow suppression from acute infection vs HIV vs medication induced (bactrim, famotidine for thrombocytopenia...however, patient was thrombocytopenic prior to receiving these medications)  --no signs of bleeding at this time  --would only transfuse if Hgb <7, platelets <10k or platelets <50K with active bleeeing.  Would recommend platelet transfusion.         Pneumonia, unspecified organism    -- suspect PJP.  Await cyto and culture result.  Antibiotics per id.              Gigi Powers MD  Pulmonology  [facility]    Critical Care Time: 35  minutes  Critical secondary to respiratory failure     Critical care was time spent personally by me on the following activities: development of treatment plan with patient or surrogate and bedside caregivers, discussions with consultants, evaluation of patient's response to treatment, examination of patient, ordering and performing treatments and interventions, ordering and review of laboratory studies, ordering and review of radiographic studies, pulse oximetry, re-evaluation of patient's condition.  This critical care time did not overlap with that of any other provider or involve time for any procedures.

## 2019-02-01 NOTE — PROGRESS NOTES
Patient remains intubated on the servo-i vent with the following settings: PRVC, 15, 430 +5, 40%. AMBU bag at [facility], all alarms are set and functioning . Will continue to monitor and wean as tolerated.Patient has an 8.0 ETT at the 24 cm tonja at the lip on the right.

## 2019-02-01 NOTE — ASSESSMENT & PLAN NOTE
Pulm following  Pt treated with  antibiotics including bactrim and  steroids for suspected PJP and PNA  concerns for TB  CTA shows 1.6 cm cavitation lesion of RUL  S/p FOB  - cultures pending  Pt extubated

## 2019-02-02 LAB
ALBUMIN SERPL BCP-MCNC: 1.7 G/DL
ALP SERPL-CCNC: 341 U/L
ALT SERPL W/O P-5'-P-CCNC: 145 U/L
ANION GAP SERPL CALC-SCNC: 6 MMOL/L
ANISOCYTOSIS BLD QL SMEAR: ABNORMAL
AST SERPL-CCNC: 107 U/L
BACTERIA BLD CULT: NORMAL
BACTERIA BLD CULT: NORMAL
BACTERIA STL CULT: NORMAL
BACTERIA STL CULT: NORMAL
BASOPHILS # BLD AUTO: 0.03 K/UL
BASOPHILS NFR BLD: 0.8 %
BILIRUB SERPL-MCNC: 0.3 MG/DL
BUN SERPL-MCNC: 19 MG/DL
CALCIUM SERPL-MCNC: 7.8 MG/DL
CHLORIDE SERPL-SCNC: 104 MMOL/L
CO2 SERPL-SCNC: 26 MMOL/L
CREAT SERPL-MCNC: 0.6 MG/DL
DIFFERENTIAL METHOD: ABNORMAL
EOSINOPHIL # BLD AUTO: 0 K/UL
EOSINOPHIL NFR BLD: 0 %
ERYTHROCYTE [DISTWIDTH] IN BLOOD BY AUTOMATED COUNT: 15.1 %
EST. GFR  (AFRICAN AMERICAN): >60 ML/MIN/1.73 M^2
EST. GFR  (NON AFRICAN AMERICAN): >60 ML/MIN/1.73 M^2
GLUCOSE SERPL-MCNC: 128 MG/DL
HCT VFR BLD AUTO: 25.8 %
HGB BLD-MCNC: 8.1 G/DL
HYPOCHROMIA BLD QL SMEAR: ABNORMAL
LYMPHOCYTES # BLD AUTO: 0.5 K/UL
LYMPHOCYTES NFR BLD: 13.1 %
MCH RBC QN AUTO: 23.9 PG
MCHC RBC AUTO-ENTMCNC: 31.4 G/DL
MCV RBC AUTO: 76 FL
MONOCYTES # BLD AUTO: 0.1 K/UL
MONOCYTES NFR BLD: 2.8 %
NEUTROPHILS # BLD AUTO: 3.3 K/UL
NEUTROPHILS NFR BLD: 83.3 %
PLATELET # BLD AUTO: 34 K/UL
PLATELET BLD QL SMEAR: ABNORMAL
PMV BLD AUTO: ABNORMAL FL
POIKILOCYTOSIS BLD QL SMEAR: SLIGHT
POLYCHROMASIA BLD QL SMEAR: ABNORMAL
POTASSIUM SERPL-SCNC: 4.4 MMOL/L
PROT SERPL-MCNC: 5.2 G/DL
RBC # BLD AUTO: 3.39 M/UL
SODIUM SERPL-SCNC: 136 MMOL/L
WBC # BLD AUTO: 3.97 K/UL

## 2019-02-02 PROCEDURE — 94640 AIRWAY INHALATION TREATMENT: CPT

## 2019-02-02 PROCEDURE — 99291 CRITICAL CARE FIRST HOUR: CPT | Mod: ,,, | Performed by: INTERNAL MEDICINE

## 2019-02-02 PROCEDURE — 20000000 HC ICU ROOM

## 2019-02-02 PROCEDURE — 63600175 PHARM REV CODE 636 W HCPCS: Performed by: INTERNAL MEDICINE

## 2019-02-02 PROCEDURE — S0039 INJECTION, SULFAMETHOXAZOLE: HCPCS | Performed by: INTERNAL MEDICINE

## 2019-02-02 PROCEDURE — 25000242 PHARM REV CODE 250 ALT 637 W/ HCPCS: Performed by: NURSE PRACTITIONER

## 2019-02-02 PROCEDURE — 25000003 PHARM REV CODE 250: Performed by: EMERGENCY MEDICINE

## 2019-02-02 PROCEDURE — 94761 N-INVAS EAR/PLS OXIMETRY MLT: CPT

## 2019-02-02 PROCEDURE — 99900035 HC TECH TIME PER 15 MIN (STAT)

## 2019-02-02 PROCEDURE — 80053 COMPREHEN METABOLIC PANEL: CPT

## 2019-02-02 PROCEDURE — 86635 COCCIDIOIDES ANTIBODY: CPT

## 2019-02-02 PROCEDURE — 99232 SBSQ HOSP IP/OBS MODERATE 35: CPT | Mod: ,,, | Performed by: INTERNAL MEDICINE

## 2019-02-02 PROCEDURE — 85025 COMPLETE CBC W/AUTO DIFF WBC: CPT

## 2019-02-02 PROCEDURE — 25000003 PHARM REV CODE 250: Performed by: INTERNAL MEDICINE

## 2019-02-02 PROCEDURE — 27000221 HC OXYGEN, UP TO 24 HOURS

## 2019-02-02 PROCEDURE — 27100171 HC OXYGEN HIGH FLOW UP TO 24 HOURS

## 2019-02-02 PROCEDURE — 99232 PR SUBSEQUENT HOSPITAL CARE,LEVL II: ICD-10-PCS | Mod: ,,, | Performed by: INTERNAL MEDICINE

## 2019-02-02 PROCEDURE — 99291 PR CRITICAL CARE, E/M 30-74 MINUTES: ICD-10-PCS | Mod: ,,, | Performed by: INTERNAL MEDICINE

## 2019-02-02 PROCEDURE — 94660 CPAP INITIATION&MGMT: CPT

## 2019-02-02 RX ADMIN — METHYLPREDNISOLONE SODIUM SUCCINATE 60 MG: 125 INJECTION, POWDER, FOR SOLUTION INTRAMUSCULAR; INTRAVENOUS at 05:02

## 2019-02-02 RX ADMIN — METHYLPREDNISOLONE SODIUM SUCCINATE 60 MG: 125 INJECTION, POWDER, FOR SOLUTION INTRAMUSCULAR; INTRAVENOUS at 12:02

## 2019-02-02 RX ADMIN — METHYLPREDNISOLONE SODIUM SUCCINATE 60 MG: 125 INJECTION, POWDER, FOR SOLUTION INTRAMUSCULAR; INTRAVENOUS at 06:02

## 2019-02-02 RX ADMIN — IPRATROPIUM BROMIDE AND ALBUTEROL SULFATE 3 ML: .5; 3 SOLUTION RESPIRATORY (INHALATION) at 11:02

## 2019-02-02 RX ADMIN — FLUCONAZOLE, SODIUM CHLORIDE 400 MG: 2 INJECTION INTRAVENOUS at 10:02

## 2019-02-02 RX ADMIN — IPRATROPIUM BROMIDE AND ALBUTEROL SULFATE 3 ML: .5; 3 SOLUTION RESPIRATORY (INHALATION) at 07:02

## 2019-02-02 RX ADMIN — SULFAMETHOXAZOLE AND TRIMETHOPRIM 295 MG: 80; 16 INJECTION, SOLUTION, CONCENTRATE INTRAVENOUS at 12:02

## 2019-02-02 RX ADMIN — IPRATROPIUM BROMIDE AND ALBUTEROL SULFATE 3 ML: .5; 3 SOLUTION RESPIRATORY (INHALATION) at 03:02

## 2019-02-02 RX ADMIN — SULFAMETHOXAZOLE AND TRIMETHOPRIM 295 MG: 80; 16 INJECTION, SOLUTION, CONCENTRATE INTRAVENOUS at 09:02

## 2019-02-02 RX ADMIN — SULFAMETHOXAZOLE AND TRIMETHOPRIM 295 MG: 80; 16 INJECTION, SOLUTION, CONCENTRATE INTRAVENOUS at 06:02

## 2019-02-02 RX ADMIN — FAMOTIDINE 20 MG: 20 TABLET ORAL at 09:02

## 2019-02-02 NOTE — ASSESSMENT & PLAN NOTE
Pancytopenia - Likely HIV-induced/related.    His thrombocytopenia is expected to improved once he starts Anti-Retroviral Therapy.    Since counts are stable and he has responded well to platelet transfusion support, will hold off on pursuing BMBx for now.    Continue to pursue platelet transfusions if counts drop below 15,000.

## 2019-02-02 NOTE — SUBJECTIVE & OBJECTIVE
Interval History:   Agitated this morning as was placed on restraints.   Pt desires to leave hospital AMA  Discussed with pt risk of severe sickness and death if leaves AMA as pt's health is compromised  After long discussion pt willing to stay for treatment  Cultures pending      Review of Systems   Constitutional: Positive for fatigue. Negative for activity change, appetite change, chills, diaphoresis and fever.   Respiratory: Positive for cough and shortness of breath.    Cardiovascular: Negative for chest pain and leg swelling.   Gastrointestinal: Negative for abdominal pain, constipation, diarrhea, nausea and vomiting.   Genitourinary: Negative for dysuria.   Musculoskeletal: Negative for back pain.   Skin: Negative for pallor, rash and wound.   Psychiatric/Behavioral: Positive for agitation.     Objective:     Vital Signs (Most Recent):  Temp: 97.1 °F (36.2 °C) (02/01/19 1550)  Pulse: 100 (02/01/19 1700)  Resp: 20 (02/01/19 1700)  BP: 102/72 (02/01/19 1700)  SpO2: (!) 93 % (02/01/19 1700) Vital Signs (24h Range):  Temp:  [97 °F (36.1 °C)-98.5 °F (36.9 °C)] 97.1 °F (36.2 °C)  Pulse:  [] 100  Resp:  [14-52] 20  SpO2:  [88 %-99 %] 93 %  BP: ()/(52-79) 102/72     Weight: 59.6 kg (131 lb 6.3 oz)  Body mass index is 21.21 kg/m².    Estimated Creatinine Clearance: 114.7 mL/min (based on SCr of 0.7 mg/dL).    Physical Exam   Constitutional: No distress.   Thin, ill appearing   HENT:   Head: Normocephalic.   Oral thrush noted   Eyes: Pupils are equal, round, and reactive to light.   Cardiovascular: Regular rhythm and normal heart sounds. Exam reveals no friction rub.   No murmur heard.  Pulmonary/Chest: No stridor. No respiratory distress. He has no wheezes.   Abdominal: Soft. He exhibits no distension. There is no tenderness. There is no guarding.   Musculoskeletal: He exhibits no tenderness.   Skin: Skin is warm. He is not diaphoretic. No erythema. No pallor.   Psychiatric: He has a normal mood and  affect.   Vitals reviewed.      Significant Labs:   Blood Culture:   Recent Labs   Lab 01/28/19  1023 01/28/19  1028   LABBLOO No Growth to date  No Growth to date  No Growth to date  No Growth to date  No Growth to date No Growth to date  No Growth to date  No Growth to date  No Growth to date  No Growth to date     C4 Count: No results for input(s): C4 in the last 48 hours.  CBC:   Recent Labs   Lab 01/31/19  0608 02/01/19  0621   WBC 2.62* 2.83*   HGB 8.2* 8.0*   HCT 24.7* 24.3*   PLT 22* 8*     CMP:   Recent Labs   Lab 01/31/19  0608 02/01/19  0621   * 136   K 4.1 4.6    105   CO2 25 25   * 152*   BUN 16 16   CREATININE 0.6 0.7   CALCIUM 7.4* 7.6*   PROT 4.8* 4.8*   ALBUMIN 1.3* 1.4*   BILITOT 0.3 0.3   ALKPHOS 320* 264*   * 171*   * 152*   ANIONGAP 5* 6*   EGFRNONAA >60 >60     Fungus Culture (Blood or Bone Marrow): No results for input(s): FUNGUSCULTUR in the last 4320 hours.  Hepatitis Panel: No results for input(s): HEPBSAG, HEPAIGM, HEPCAB in the last 48 hours.    Invalid input(s): HAPBIGM  HIV 1/2 Antibodies: No results for input(s): [id number] in the last 48 hours.  HIV Rapid: No results for input(s): HIVRAPID in the last 48 hours.  Quantiferon: No results for input(s): NIL, TBAG, TBAGNIL, MITOGENNIL, TBGOLD in the last 48 hours.  Respiratory Culture:   Recent Labs   Lab 01/31/19  1035   GSRESP <10 epithelial cells per low power field.  Many WBC's  Many budding yeast   RESPIRATORYC No Growth     Urine Culture: No results for input(s): LABURIN in the last 4320 hours.  Urine Studies:   Recent Labs   Lab 01/28/19  0734   COLORU Yellow   APPEARANCEUA Clear   PHUR 6.0   SPECGRAV 1.015   PROTEINUA Negative   GLUCUA Negative   KETONESU Negative   BILIRUBINUA Negative   OCCULTUA Negative   NITRITE Negative   UROBILINOGEN 2.0-3.0*   LEUKOCYTESUR Negative   SQUAMEPITHEL 1     Wound Culture: No results for input(s): LABAERO in the last 4320 hours.  All pertinent labs within  the past 24 hours have been reviewed.    Significant Imaging: I have reviewed all pertinent imaging results/findings within the past 24 hours.

## 2019-02-02 NOTE — ASSESSMENT & PLAN NOTE
Secondary to probable CAP and/or PJP, cannot rule out TB  Appreciate pulmonology recs   S/p bronch  IV steroids, nebs,antibiotic    Extubated 2/1

## 2019-02-02 NOTE — PROGRESS NOTES
"[facility]  Progress Note    Patient Name: Lior Prado  MRN: [id number]  Patient Class: IP- Inpatient   Admission Date: 1/28/2019  Length of Stay: 5 days  Attending Physician: Nhung Carter MD  Primary Care Provider: Primary Doctor No        Subjective:     Principal Problem:HIV disease    HPI:  Mr. Donnelly is a 42 yo M with no reported past medical history- presents with a CC of weakness and some SOB for one week. He notes that he was treated with abx for a "pneumonia" 1/13-1/23.  He presents to the ED and is found to have thrush. Further, his HIV is +. The patient was started on treatment for PJP pneumonia and ID was consulted. The patient is thin and ill appearing.      Hospital Course:  Mr. Donnelly is a 42 yo M with no reported past medical history- presents with a CC of weakness and some SOB for one week. He notes that he was treated with abx for a "pneumonia" 1/13-1/23.  He presents to the ED and is found to have thrush. Further, his HIV is +. The patient was started on treatment for PJP pneumonia and ID was consulted. The patient had a CTA of his chest as well.     Pt was transferred to ICU due to high amounts of O2 requirements and rising lactate/marginal BP.  Pancytopenia worsening and will need one unit platelet transfused.  On IVF and no indication for vasopressor. Pulmonology consulted for possible bronch in new Dx HIV, concern for PJP. Awaiting HIV workup/CD4, etc. ID consulted and assisting with antibacterial meds. Awaiting cultures.     1/30 CD4 count = 11; Pt O2 requirements going up, no won bipap; platelets 20k and will prepare for intubation for bronch tomorrow; antibiotics managed by ID - currently on bactrim and rocephin treating CAP and probable PJP. Diflucan for OP candida. Pancytopenia likely due to sepsis and possible opportunistic infections. Transaminases and lactate/LDH rising. Sepsis with out shock but prognosis is guarded.     1/31 - " s/p bronch, remains on ventilator   2/1- extubated and was agitated wanting to leave earlier but calmed down and willing to stay for treatment, rapid AFB stain resulted negative today, taken off isolation, awaiting the rest of workup for final treatment plan; if remains stable can transfer to floor soon. bipap PRn use    Interval History: through language line, pt was told that illness is stable and trying to wean oxygen down    Review of Systems   HENT: Positive for congestion.    Eyes: Negative.    Respiratory: Positive for cough and shortness of breath.    Cardiovascular: Negative.    Gastrointestinal: Negative.    Genitourinary: Negative.    Musculoskeletal: Negative.    Neurological: Positive for weakness.   Psychiatric/Behavioral: The patient is nervous/anxious.      Objective:     Vital Signs (Most Recent):  Temp: 97.7 °F (36.5 °C) (02/02/19 1100)  Pulse: 104 (02/02/19 1300)  Resp: 20 (02/02/19 1300)  BP: 123/76 (02/02/19 1300)  SpO2: (!) 91 % (02/02/19 1300) Vital Signs (24h Range):  Temp:  [96.9 °F (36.1 °C)-97.7 °F (36.5 °C)] 97.7 °F (36.5 °C)  Pulse:  [] 104  Resp:  [14-52] 20  SpO2:  [88 %-100 %] 91 %  BP: ()/(55-94) 123/76     Weight: 60.2 kg (132 lb 11.5 oz)  Body mass index is 21.42 kg/m².    Intake/Output Summary (Last 24 hours) at 2/2/2019 1450  Last data filed at 2/2/2019 1215  Gross per 24 hour   Intake 1190 ml   Output 2050 ml   Net -860 ml      Physical Exam   Constitutional: He appears well-developed and well-nourished. No distress.   HENT:   Head: Normocephalic and atraumatic.   Mouth/Throat: Oropharynx is clear and moist.   Neck: Normal range of motion. Neck supple. No JVD present.   Cardiovascular: Regular rhythm and intact distal pulses.   Tachycardia S1 S2       Significant Labs: All pertinent labs within the past 24 hours have been reviewed.    Significant Imaging: I have reviewed and interpreted all pertinent imaging results/findings within the past 24  hours.    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, no visits to [place] in 7 years, no other sick contacts     ID consulted  - currently on bactrim, azithromycin, zosyn and vancomycin, oral diflucan added today for thrush      Acute hypoxemic respiratory failure    Secondary to probable CAP and/or PJP, cannot rule out TB  Appreciate pulmonology recs   S/p bronch  IV steroids, nebs,antibiotic    Extubated 2/1       Pancytopenia    Secondary to HIV/?AIDS  Thrombocytopenia 12k - transfuse one unit platelets - anticipate bronch    - Coags pending - TTP common with HIV     Transfuse platelets 2/1       Elevated LFTs    Not sure of cause- from ETOH? Vs other - + HIV/sepsis   Hepatitis panel negative  No abdominal pain and bilirubin not suggesting obstructive pattern or HIV cholangiopathy          Malnutrition of moderate degree    Pt did not comment on changes in diet  + loose stools         Tobacco abuse    Smoking cessation education   Nicotine patch offered        Hyponatremia    Likely from lung involvement ?  SIADH?    Improved with IVF       Weakness    Will consider PT/OT consult when stable        Thrush    Started on diflucan        Pneumonia, unspecified organism    With hypoxia and sepsis. Will have broad spectrum Abx + treatment for PJP.  +/- steroids. Will discuss with ID.   Rocephin dc'd by ID  Bactrim - probable PJP - concern with low platelets that alternative may be indicated if continues to drop - appreciate hematology input and recs   pulm consulted - should have bronch for PJP, TB concerns  Wean oxygen as tolerated  Vent management per pulmonary              VTE Risk Mitigation (From admission, onward)         Ordered     IP VTE LOW RISK PATIENT  Once      01/28/19 1927     Place DENY hose  Until discontinued      01/28/19 1927          Critical care time spent on the evaluation and treatment of severe organ dysfunction, review of pertinent labs and imaging studies, discussions with consulting providers and discussions with patient/family: 45 minutes.    Nhung Carter MD  Department of Hospital Medicine   [facility]

## 2019-02-02 NOTE — NURSING
Informed Dr Carter of pt needing to be NPO 6 hours prior to ABD US, ok to place NPO after midnight

## 2019-02-02 NOTE — PROGRESS NOTES
[facility]  Pulmonology  Progress Note    Patient Name: Lior Prado  MRN: [id number]  Admission Date: 1/28/2019  Hospital Length of Stay: 5 days  Code Status: Full Code  Attending Provider: Nhung Carter MD  Primary Care Provider: Primary Doctor No   Principal Problem: HIV disease    Subjective:     Interval History: NEFTALY, Respiratory culture with large number of budding yeast, still requiring HFNC and wearing BIPAP for comfort at night, still with cough, generalized malaise, but no new complaints    Objective:     Vital Signs (Most Recent):  Temp: 97.1 °F (36.2 °C) (02/02/19 1500)  Pulse: 84 (02/02/19 1540)  Resp: 13 (02/02/19 1540)  BP: 116/70 (02/02/19 1500)  SpO2: 95 % (02/02/19 1540) Vital Signs (24h Range):  Temp:  [96.9 °F (36.1 °C)-97.7 °F (36.5 °C)] 97.1 °F (36.2 °C)  Pulse:  [] 84  Resp:  [13-48] 13  SpO2:  [88 %-100 %] 95 %  BP: (102-133)/(55-94) 116/70     Weight: 60.2 kg (132 lb 11.5 oz)  Body mass index is 21.42 kg/m².      Intake/Output Summary (Last 24 hours) at 2/2/2019 1632  Last data filed at 2/2/2019 1400  Gross per 24 hour   Intake 950 ml   Output 2150 ml   Net -1200 ml       Physical Exam   Constitutional: He is oriented to person, place, and time. He has a sickly appearance.   emanciated   HENT:   Head: Normocephalic.   Eyes: Conjunctivae and EOM are normal. Pupils are equal, round, and reactive to light. No scleral icterus.   Cardiovascular: Normal rate, regular rhythm and normal heart sounds.   No murmur heard.  Pulmonary/Chest: Effort normal and breath sounds normal. He has no wheezes. He has no rhonchi.   Abdominal: Soft. Bowel sounds are normal. There is no hepatosplenomegaly. There is no tenderness. There is no guarding.   Musculoskeletal: He exhibits no edema.   Neurological: He is alert and oriented to person, place, and time.   Skin: No rash noted. No erythema.       Vents:  Vent Mode: NIV+ PC (02/02/19 5498)  Ventilator Initiated: Yes (01/31/19  1000)  Set Rate: 15 bmp (02/02/19 0341)  Vt Set: 430 mL (02/01/19 0700)  Pressure Support: 7 cmH20 (01/30/19 1111)  PEEP/CPAP: 5 cmH20 (02/02/19 0341)  Oxygen Concentration (%): 40 (02/02/19 0740)  Peak Airway Pressure: 12 cmH2O (02/02/19 0341)  Total Ve: 12.5 mL (02/02/19 0341)  F/VT Ratio<105 (RSBI): (!) 33.28 (02/02/19 0341)    Lines/Drains/Airways     Peripheral Intravenous Line                 Peripheral IV - Single Lumen 01/31/19 1243 Right Upper Arm 2 days         Peripheral IV - Single Lumen 01/31/19 1244 Anterior;Proximal;Right Forearm 2 days                Significant Labs:    CBC/Anemia Profile:  Recent Labs   Lab 02/01/19  0621 02/02/19  0433   WBC 2.83* 3.97   HGB 8.0* 8.1*   HCT 24.3* 25.8*   PLT 8* 34*   MCV 76* 76*   RDW 15.0* 15.1*        Chemistries:  Recent Labs   Lab 02/01/19  0621 02/02/19  0433    136   K 4.6 4.4    104   CO2 25 26   BUN 16 19   CREATININE 0.7 0.6   CALCIUM 7.6* 7.8*   ALBUMIN 1.4* 1.7*   PROT 4.8* 5.2*   BILITOT 0.3 0.3   ALKPHOS 264* 341*   * 145*   * 107*       ABGs:   Recent Labs   Lab 02/01/19  1015   PH 7.436   PCO2 31.2*   HCO3 21.0*   POCSATURATED 96   BE -3       Significant Imaging:  CXR: I have reviewed all pertinent results/findings within the past 24 hours and my personal findings are:  no acute changes    Assessment/Plan:     Acute hypoxemic respiratory failure    Likely PJP PNA  --Wean HFNC as tolerates, does not really need BIPAP  --Abx/steroids per ID, f/u culture and pathology           I spent 35 minutes of critical care time    Raz Palacios MD  Pulmonology  [facility] [place]

## 2019-02-02 NOTE — PROGRESS NOTES
[facility]  Hematology/Oncology  Progress Note    Patient Name: Lior Prado  Admission Date: 1/28/2019  Hospital Length of Stay: 5 days  Code Status: Full Code     Subjective:     HPI:  Pt is a [place] speaking 42 y/o male with no pmhx presented with generalized weakness and increasing SOB and cough x 1 week, weight loss. In the ED patient was also diagnosed with thrush.  He is HIV positive.  He was started on treatment for PJP pneumonia.     Pt in ICU for 02 requirements secondary to hypoxia.  CBC on admit 01/28/2018 reveals white blood cell count of 3.57 hemoglobin of 10.5 grams/deciliter hematocrit 30.7% and a platelet count of 75k.  This platelet count trending during hospitalization to 12 K.  Patient has undergone platelet transfusion with recent platelet counts of 20 K. No active bleeding.    Interval History: Extubated    Oncology Treatment Plan:   [No treatment plan]    Medications:  Continuous Infusions:   dexmedetomidine (PRECEDEX) infusion Stopped (02/01/19 1114)    fentanyl Stopped (02/01/19 1004)    propofol Stopped (02/01/19 1030)     Scheduled Meds:   albuterol-ipratropium  3 mL Nebulization Q4H    famotidine  20 mg Oral BID    fluconazole (DIFLUCAN) IVPB  400 mg Intravenous Q24H    methylPREDNISolone sodium succinate  60 mg Intravenous Q6H    trimethoprim-sulfamethoxasole (BACTRIM) IVPB (custom)  295 mg Intravenous Q8H     PRN Meds:sodium chloride, acetaminophen, OLANZapine, ondansetron     Review of Systems   Unable to perform ROS: Other     Objective:     Vital Signs (Most Recent):  Temp: 97.7 °F (36.5 °C) (02/02/19 1100)  Pulse: 93 (02/02/19 1400)  Resp: 15 (02/02/19 1400)  BP: 119/68 (02/02/19 1400)  SpO2: 95 % (02/02/19 1400) Vital Signs (24h Range):  Temp:  [96.9 °F (36.1 °C)-97.7 °F (36.5 °C)] 97.7 °F (36.5 °C)  Pulse:  [] 93  Resp:  [14-52] 15  SpO2:  [88 %-100 %] 95 %  BP: ()/(55-94) 119/68     Weight: 60.2 kg (132 lb 11.5 oz)  Body mass index  is 21.42 kg/m².  Body surface area is 1.67 meters squared.      Intake/Output Summary (Last 24 hours) at 2/2/2019 1508  Last data filed at 2/2/2019 1400  Gross per 24 hour   Intake 1190 ml   Output 2450 ml   Net -1260 ml       Physical Exam   Constitutional:  Non-toxic appearance. He does not have a sickly appearance.   HENT:   Mouth/Throat: Oropharynx is clear and moist. No oral lesions. No oropharyngeal exudate.       Neck: Neck supple. No thyroid mass and no thyromegaly present.   Cardiovascular: Normal rate and regular rhythm.   Pulmonary/Chest: Effort normal and breath sounds normal. No accessory muscle usage. No respiratory distress. He has no wheezes. He has no rales.   Abdominal: Bowel sounds are normal. He exhibits no ascites and no mass. There is no hepatosplenomegaly. There is no tenderness.   Lymphadenopathy:     He has no cervical adenopathy.   Neurological: He is alert. He has normal strength.   Skin: No bruising, no lesion and no rash noted.   Psychiatric: Judgment and thought content normal. Cognition and memory are normal.   Vitals reviewed.      Significant Labs:   All pertinent labs from the last 24 hours have been reviewed.    Diagnostic Results:  I have reviewed all pertinent imaging results/findings within the past 24 hours.    Assessment/Plan:     * HIV disease    Followed by ID   CD4 count 11  Pt undergoing IV Bactrim for PJP pneumonia prophylaxis.        Acute hypoxemic respiratory failure    Pulm following  Pt treated with antibiotics including bactrim and steroids for suspected PJP and PNA  Pt extubated       Pancytopenia    Pancytopenia - Likely HIV-induced/related.    His thrombocytopenia is expected to improved once he starts Anti-Retroviral Therapy.    Since counts are stable and he has responded well to platelet transfusion support, will hold off on pursuing BMBx for now.    Continue to pursue platelet transfusions if counts drop below 15,000.           Elevated LFTs    Could be  HIV-related  Await Abdominal Ultrasound study       Cory Jordan MD  Hematology/Oncology  [facility]

## 2019-02-02 NOTE — SUBJECTIVE & OBJECTIVE
Interval History: Extubated    Oncology Treatment Plan:   [No treatment plan]    Medications:  Continuous Infusions:   dexmedetomidine (PRECEDEX) infusion Stopped (02/01/19 1114)    fentanyl Stopped (02/01/19 1004)    propofol Stopped (02/01/19 1030)     Scheduled Meds:   albuterol-ipratropium  3 mL Nebulization Q4H    famotidine  20 mg Oral BID    fluconazole (DIFLUCAN) IVPB  400 mg Intravenous Q24H    methylPREDNISolone sodium succinate  60 mg Intravenous Q6H    trimethoprim-sulfamethoxasole (BACTRIM) IVPB (custom)  295 mg Intravenous Q8H     PRN Meds:sodium chloride, acetaminophen, OLANZapine, ondansetron     Review of Systems   Unable to perform ROS: Other     Objective:     Vital Signs (Most Recent):  Temp: 97.7 °F (36.5 °C) (02/02/19 1100)  Pulse: 93 (02/02/19 1400)  Resp: 15 (02/02/19 1400)  BP: 119/68 (02/02/19 1400)  SpO2: 95 % (02/02/19 1400) Vital Signs (24h Range):  Temp:  [96.9 °F (36.1 °C)-97.7 °F (36.5 °C)] 97.7 °F (36.5 °C)  Pulse:  [] 93  Resp:  [14-52] 15  SpO2:  [88 %-100 %] 95 %  BP: ()/(55-94) 119/68     Weight: 60.2 kg (132 lb 11.5 oz)  Body mass index is 21.42 kg/m².  Body surface area is 1.67 meters squared.      Intake/Output Summary (Last 24 hours) at 2/2/2019 1508  Last data filed at 2/2/2019 1400  Gross per 24 hour   Intake 1190 ml   Output 2450 ml   Net -1260 ml       Physical Exam   Constitutional:  Non-toxic appearance. He does not have a sickly appearance.   HENT:   Mouth/Throat: Oropharynx is clear and moist. No oral lesions. No oropharyngeal exudate.       Neck: Neck supple. No thyroid mass and no thyromegaly present.   Cardiovascular: Normal rate and regular rhythm.   Pulmonary/Chest: Effort normal and breath sounds normal. No accessory muscle usage. No respiratory distress. He has no wheezes. He has no rales.   Abdominal: Bowel sounds are normal. He exhibits no ascites and no mass. There is no hepatosplenomegaly. There is no tenderness.   Lymphadenopathy:      He has no cervical adenopathy.   Neurological: He is alert. He has normal strength.   Skin: No bruising, no lesion and no rash noted.   Psychiatric: Judgment and thought content normal. Cognition and memory are normal.   Vitals reviewed.      Significant Labs:   All pertinent labs from the last 24 hours have been reviewed.    Diagnostic Results:  I have reviewed all pertinent imaging results/findings within the past 24 hours.

## 2019-02-02 NOTE — ASSESSMENT & PLAN NOTE
44 y/o German speaking male with no significant medical history presents with progressively worsening SOB, cough, weakness and 6kg weight loss. Denies hemoptysis or sputum production. He was found to be positive for HIV. We are consulted further evaluation and management.     Blood cultures NGTD  Infectious workup pending  S/p bronchoscopy. -AFB cultures, PCP smear, TB PCR, fungal cultures, rcx pending     1. Continue IV Bactrim for PJP pneumonia prophylaxis. Thrombocytopenia noted. G6PD testing ordered. If continues to decline may consider transitioning to atovaquoneand clindamycin empirically pending G6PD test. Discussed with ID pharmacy.  Will hold off adding therapy for MAC prophylaxis (macrolides,fqs) as monotherapy for MAC can increase drug resistance.   2. HIV positive CD4 count 11   3. Bronchoscopy this AM.Cultures sent for fungal, RVP, gram stain and aerobic/anaeoribc. AFB cultures + TB PCR pending. Discussed with microlab. Lab received specimen and orders. Discussed with pathology to perform PCP smear on samples sent to pathology today. PCP smear pending  4. afb blood cultures pending   5. Additional studies ordered - toxoplasma IgG, fungal studies, quantiferon gold TB and RPR. quantgold indeterminant. Repeat quantgold ( Monday morning as no testing done Friday-Sunday)  5. CTA chest 1.6 cm cavitation lesion of RUL. Pathology results - specimen inadequate.   6. Continue 400 mg fluconazole   7. Will hold off on starting antiviral therapy for now as starting medication can cause risk of IRIS.  8. Social situation at home difficult as without permanent residence. After long discussion pt willing to stay for treatment.

## 2019-02-02 NOTE — SUBJECTIVE & OBJECTIVE
Interval History: NEFTALY, Respiratory culture with large number of budding yeast, still requiring HFNC and wearing BIPAP for comfort at night, still with cough, generalized malaise, but no new complaints    Objective:     Vital Signs (Most Recent):  Temp: 97.1 °F (36.2 °C) (02/02/19 1500)  Pulse: 84 (02/02/19 1540)  Resp: 13 (02/02/19 1540)  BP: 116/70 (02/02/19 1500)  SpO2: 95 % (02/02/19 1540) Vital Signs (24h Range):  Temp:  [96.9 °F (36.1 °C)-97.7 °F (36.5 °C)] 97.1 °F (36.2 °C)  Pulse:  [] 84  Resp:  [13-48] 13  SpO2:  [88 %-100 %] 95 %  BP: (102-133)/(55-94) 116/70     Weight: 60.2 kg (132 lb 11.5 oz)  Body mass index is 21.42 kg/m².      Intake/Output Summary (Last 24 hours) at 2/2/2019 1632  Last data filed at 2/2/2019 1400  Gross per 24 hour   Intake 950 ml   Output 2150 ml   Net -1200 ml       Physical Exam   Constitutional: He is oriented to person, place, and time. He has a sickly appearance.   emanciated   HENT:   Head: Normocephalic.   Eyes: Conjunctivae and EOM are normal. Pupils are equal, round, and reactive to light. No scleral icterus.   Cardiovascular: Normal rate, regular rhythm and normal heart sounds.   No murmur heard.  Pulmonary/Chest: Effort normal and breath sounds normal. He has no wheezes. He has no rhonchi.   Abdominal: Soft. Bowel sounds are normal. There is no hepatosplenomegaly. There is no tenderness. There is no guarding.   Musculoskeletal: He exhibits no edema.   Neurological: He is alert and oriented to person, place, and time.   Skin: No rash noted. No erythema.       Vents:  Vent Mode: NIV+ PC (02/02/19 0341)  Ventilator Initiated: Yes (01/31/19 1000)  Set Rate: 15 bmp (02/02/19 0341)  Vt Set: 430 mL (02/01/19 0700)  Pressure Support: 7 cmH20 (01/30/19 1111)  PEEP/CPAP: 5 cmH20 (02/02/19 0341)  Oxygen Concentration (%): 40 (02/02/19 0740)  Peak Airway Pressure: 12 cmH2O (02/02/19 0341)  Total Ve: 12.5 mL (02/02/19 0341)  F/VT Ratio<105 (RSBI): (!) 33.28 (02/02/19  0341)    Lines/Drains/Airways     Peripheral Intravenous Line                 Peripheral IV - Single Lumen 01/31/19 1243 Right Upper Arm 2 days         Peripheral IV - Single Lumen 01/31/19 1244 Anterior;Proximal;Right Forearm 2 days                Significant Labs:    CBC/Anemia Profile:  Recent Labs   Lab 02/01/19  0621 02/02/19  0433   WBC 2.83* 3.97   HGB 8.0* 8.1*   HCT 24.3* 25.8*   PLT 8* 34*   MCV 76* 76*   RDW 15.0* 15.1*        Chemistries:  Recent Labs   Lab 02/01/19  0621 02/02/19  0433    136   K 4.6 4.4    104   CO2 25 26   BUN 16 19   CREATININE 0.7 0.6   CALCIUM 7.6* 7.8*   ALBUMIN 1.4* 1.7*   PROT 4.8* 5.2*   BILITOT 0.3 0.3   ALKPHOS 264* 341*   * 145*   * 107*       ABGs:   Recent Labs   Lab 02/01/19  1015   PH 7.436   PCO2 31.2*   HCO3 21.0*   POCSATURATED 96   BE -3       Significant Imaging:  CXR: I have reviewed all pertinent results/findings within the past 24 hours and my personal findings are:  no acute changes

## 2019-02-02 NOTE — PROGRESS NOTES
[facility]  Infectious Disease  Progress Note    Patient Name: Lior Prado  MRN: [id number]  Admission Date: 1/28/2019  Length of Stay: 4 days  Attending Physician: Nhung Carter MD  Primary Care Provider: Primary Doctor No    Isolation Status: No active isolations  Assessment/Plan:      * HIV disease    44 y/o Iraqi speaking male with no significant medical history presents with progressively worsening SOB, cough, weakness and 6kg weight loss. Denies hemoptysis or sputum production. He was found to be positive for HIV. We are consulted further evaluation and management.     Blood cultures NGTD  Infectious workup pending  S/p bronchoscopy. -AFB cultures, PCP smear, TB PCR, fungal cultures, rcx pending     1. Continue IV Bactrim for PJP pneumonia prophylaxis. Thrombocytopenia noted. G6PD testing ordered. If continues to decline may consider transitioning to atovaquoneand clindamycin empirically pending G6PD test. Discussed with ID pharmacy.  Will hold off adding therapy for MAC prophylaxis (macrolides,fqs) as monotherapy for MAC can increase drug resistance.   2. HIV positive CD4 count 11   3. Bronchoscopy this AM.Cultures sent for fungal, RVP, gram stain and aerobic/anaeoribc. AFB cultures + TB PCR pending. Discussed with microlab. Lab received specimen and orders. Discussed with pathology to perform PCP smear on samples sent to pathology today. PCP smear pending  4. afb blood cultures pending   5. Additional studies ordered - toxoplasma IgG, fungal studies, quantiferon gold TB and RPR. quantgold indeterminant. Repeat quantgold ( Monday morning as no testing done Friday-Sunday)  5. CTA chest 1.6 cm cavitation lesion of RUL. Pathology results - specimen inadequate.   6. Continue 400 mg fluconazole   7. Will hold off on starting antiviral therapy for now as starting medication can cause risk of IRIS.  8. Social situation at home difficult as without permanent residence. After long  discussion pt willing to stay for treatment.            Thank you for your consult. I will follow up with the patient. If you have any questions over the weekend please ID on call Dr. Lubna Rogers PA-C  Infectious Disease  [facility]  Pgr [phone]    Subjective:     Principal Problem:HIV disease    HPI: 42 y/o Pashto speaking male with no significant medical history presents with progressively worsening SOB and weakness for the past week. Associated symptoms include dizziness. He reports having a cough (mostly at nights) for the past week. Denies hemoptysis or sputum production. He denies having any fever chills or night sweats at home. No n/v/d,dysuria. He reports having dyspnea on exertion and 6kg weight loss. On admission he was found to be febrile and significantly diaphoretic. His HIV rapid test is positive. Concerns for PJP pneumonia. He is thin and ill appearing.    He works as a . He denies incarceration or homelessness. He denies knowing about HIV diagnosis. He smokes, denies IVDU or illicit drug use. He denies exposure to TB. No significant allergies at this time.     Blood cultures NGTD  He is febrile 101 and tachycardic  Interval History:   Agitated this morning as was placed on restraints.   Pt desires to leave hospital AMA  Discussed with pt risk of severe sickness and death if leaves AMA as pt's health is compromised  After long discussion pt willing to stay for treatment  Cultures pending      Review of Systems   Constitutional: Positive for fatigue. Negative for activity change, appetite change, chills, diaphoresis and fever.   Respiratory: Positive for cough and shortness of breath.    Cardiovascular: Negative for chest pain and leg swelling.   Gastrointestinal: Negative for abdominal pain, constipation, diarrhea, nausea and vomiting.   Genitourinary: Negative for dysuria.   Musculoskeletal: Negative for back pain.   Skin: Negative for  pallor, rash and wound.   Psychiatric/Behavioral: Positive for agitation.     Objective:     Vital Signs (Most Recent):  Temp: 97.1 °F (36.2 °C) (02/01/19 1550)  Pulse: 100 (02/01/19 1700)  Resp: 20 (02/01/19 1700)  BP: 102/72 (02/01/19 1700)  SpO2: (!) 93 % (02/01/19 1700) Vital Signs (24h Range):  Temp:  [97 °F (36.1 °C)-98.5 °F (36.9 °C)] 97.1 °F (36.2 °C)  Pulse:  [] 100  Resp:  [14-52] 20  SpO2:  [88 %-99 %] 93 %  BP: ()/(52-79) 102/72     Weight: 59.6 kg (131 lb 6.3 oz)  Body mass index is 21.21 kg/m².    Estimated Creatinine Clearance: 114.7 mL/min (based on SCr of 0.7 mg/dL).    Physical Exam   Constitutional: No distress.   Thin, ill appearing   HENT:   Head: Normocephalic.   Oral thrush noted   Eyes: Pupils are equal, round, and reactive to light.   Cardiovascular: Regular rhythm and normal heart sounds. Exam reveals no friction rub.   No murmur heard.  Pulmonary/Chest: No stridor. No respiratory distress. He has no wheezes.   Abdominal: Soft. He exhibits no distension. There is no tenderness. There is no guarding.   Musculoskeletal: He exhibits no tenderness.   Skin: Skin is warm. He is not diaphoretic. No erythema. No pallor.   Psychiatric: He has a normal mood and affect.   Vitals reviewed.      Significant Labs:   Blood Culture:   Recent Labs   Lab 01/28/19  1023 01/28/19  1028   LABBLOO No Growth to date  No Growth to date  No Growth to date  No Growth to date  No Growth to date No Growth to date  No Growth to date  No Growth to date  No Growth to date  No Growth to date     C4 Count: No results for input(s): C4 in the last 48 hours.  CBC:   Recent Labs   Lab 01/31/19  0608 02/01/19  0621   WBC 2.62* 2.83*   HGB 8.2* 8.0*   HCT 24.7* 24.3*   PLT 22* 8*     CMP:   Recent Labs   Lab 01/31/19  0608 02/01/19  0621   * 136   K 4.1 4.6    105   CO2 25 25   * 152*   BUN 16 16   CREATININE 0.6 0.7   CALCIUM 7.4* 7.6*   PROT 4.8* 4.8*   ALBUMIN 1.3* 1.4*   BILITOT 0.3 0.3    ALKPHOS 320* 264*   * 171*   * 152*   ANIONGAP 5* 6*   EGFRNONAA >60 >60     Fungus Culture (Blood or Bone Marrow): No results for input(s): FUNGUSCULTUR in the last 4320 hours.  Hepatitis Panel: No results for input(s): HEPBSAG, HEPAIGM, HEPCAB in the last 48 hours.    Invalid input(s): HAPBIGM  HIV 1/2 Antibodies: No results for input(s): [id number] in the last 48 hours.  HIV Rapid: No results for input(s): HIVRAPID in the last 48 hours.  Quantiferon: No results for input(s): NIL, TBAG, TBAGNIL, MITOGENNIL, TBGOLD in the last 48 hours.  Respiratory Culture:   Recent Labs   Lab 01/31/19  1035   GSRESP <10 epithelial cells per low power field.  Many WBC's  Many budding yeast   RESPIRATORYC No Growth     Urine Culture: No results for input(s): LABURIN in the last 4320 hours.  Urine Studies:   Recent Labs   Lab 01/28/19  0734   COLORU Yellow   APPEARANCEUA Clear   PHUR 6.0   SPECGRAV 1.015   PROTEINUA Negative   GLUCUA Negative   KETONESU Negative   BILIRUBINUA Negative   OCCULTUA Negative   NITRITE Negative   UROBILINOGEN 2.0-3.0*   LEUKOCYTESUR Negative   SQUAMEPITHEL 1     Wound Culture: No results for input(s): LABAERO in the last 4320 hours.  All pertinent labs within the past 24 hours have been reviewed.    Significant Imaging: I have reviewed all pertinent imaging results/findings within the past 24 hours.

## 2019-02-02 NOTE — PROGRESS NOTES
Pt received on Bipap 12/5 and 40% with NARN, will monitor pt status and wean as tolerated.Pt face and nose checked for redness or breakdown. None noted at this time.

## 2019-02-02 NOTE — ASSESSMENT & PLAN NOTE
Secondary to HIV/?AIDS  Thrombocytopenia 12k - transfuse one unit platelets - anticipate bronch    - Coags pending - TTP common with HIV     Transfuse platelets 2/1

## 2019-02-02 NOTE — PROGRESS NOTES
"[facility] Medicine  Progress Note    Patient Name: Lior Prado  MRN: [id number]  Patient Class: IP- Inpatient   Admission Date: 1/28/2019  Length of Stay: 4 days  Attending Physician: Nhung Carter MD  Primary Care Provider: Primary Doctor No        Subjective:     Principal Problem:HIV disease    HPI:  Mr. Donnelly is a 44 yo M with no reported past medical history- presents with a CC of weakness and some SOB for one week. He notes that he was treated with abx for a "pneumonia" 1/13-1/23.  He presents to the ED and is found to have thrush. Further, his HIV is +. The patient was started on treatment for PJP pneumonia and ID was consulted. The patient is thin and ill appearing.      Hospital Course:  Mr. Donnelly is a 44 yo M with no reported past medical history- presents with a CC of weakness and some SOB for one week. He notes that he was treated with abx for a "pneumonia" 1/13-1/23.  He presents to the ED and is found to have thrush. Further, his HIV is +. The patient was started on treatment for PJP pneumonia and ID was consulted. The patient had a CTA of his chest as well.     Pt was transferred to ICU due to high amounts of O2 requirements and rising lactate/marginal BP.  Pancytopenia worsening and will need one unit platelet transfused.  On IVF and no indication for vasopressor. Pulmonology consulted for possible bronch in new Dx HIV, concern for PJP. Awaiting HIV workup/CD4, etc. ID consulted and assisting with antibacterial meds. Awaiting cultures.     1/30 CD4 count = 11; Pt O2 requirements going up, no won bipap; platelets 20k and will prepare for intubation for bronch tomorrow; antibiotics managed by ID - currently on bactrim and rocephin treating CAP and probable PJP. Diflucan for OP candida. Pancytopenia likely due to sepsis and possible opportunistic infections. Transaminases and lactate/LDH rising. Sepsis with out shock but prognosis is guarded.     1/31 - " s/p bronch, remains on ventilator   2/1- extubated and was agitated wanting to leave earlier but calmed down and willing to stay for treatment, rapid AFB stain resulted negative today, taken off isolation, awaiting the rest of workup for final treatment plan; if remains stable can transfer to floor soon. bipap PRn use    Interval History: pt cooperative in bed resting     Review of Systems   HENT: Positive for congestion.    Eyes: Negative.    Respiratory: Positive for cough and shortness of breath.    Cardiovascular: Negative.    Gastrointestinal: Negative.    Genitourinary: Negative.    Musculoskeletal: Negative.    Neurological: Positive for weakness.   Psychiatric/Behavioral: The patient is nervous/anxious.      Objective:     Vital Signs (Most Recent):  Temp: 97.1 °F (36.2 °C) (02/01/19 1550)  Pulse: 100 (02/01/19 1700)  Resp: 20 (02/01/19 1700)  BP: 102/72 (02/01/19 1700)  SpO2: (!) 93 % (02/01/19 1700) Vital Signs (24h Range):  Temp:  [97 °F (36.1 °C)-98.5 °F (36.9 °C)] 97.1 °F (36.2 °C)  Pulse:  [] 100  Resp:  [14-52] 20  SpO2:  [88 %-99 %] 93 %  BP: ()/(52-79) 102/72     Weight: 59.6 kg (131 lb 6.3 oz)  Body mass index is 21.21 kg/m².    Intake/Output Summary (Last 24 hours) at 2/1/2019 1936  Last data filed at 2/1/2019 1616  Gross per 24 hour   Intake 1192.47 ml   Output 1135 ml   Net 57.47 ml      Physical Exam   Constitutional: He appears well-developed and well-nourished. No distress.   HENT:   Head: Normocephalic and atraumatic.   Mouth/Throat: Oropharynx is clear and moist.   Neck: Normal range of motion. Neck supple. No JVD present.   Cardiovascular: Regular rhythm and intact distal pulses.   Tachycardia S1 S2       Significant Labs:   Blood Culture: No results for input(s): LABBLOO in the last 48 hours.  BMP:   Recent Labs   Lab 02/01/19  0621   *      K 4.6      CO2 25   BUN 16   CREATININE 0.7   CALCIUM 7.6*     CBC:   Recent Labs   Lab 01/31/19  0608 02/01/19  0621    WBC 2.62* 2.83*   HGB 8.2* 8.0*   HCT 24.7* 24.3*   PLT 22* 8*       Significant Imaging: I have reviewed all pertinent imaging results/findings within the past 24 hours.    Assessment/Plan:      * HIV disease    CD4 viral count pending  Pt is unaware of virus and does not want information to be shared with anyone - pt  Has a wife in [place] who is healthy   Pt denies, intercourse x 7 years, no IV drug use, no incarceration, no blood transfusions, professional tattoos with clean needles to his knowledge, no visits to [place] in 7 years, no other sick contacts     ID consulted  - currently on bactrim, azithromycin, zosyn and vancomycin, oral diflucan added today for thrush      Acute hypoxemic respiratory failure    Secondary to probable CAP and/or PJP, cannot rule out TB  Appreciate pulmonology recs   S/p bronch  IV steroids, nebs,antibiotic    Extubated 2/1       Pancytopenia    Secondary to HIV/?AIDS  Thrombocytopenia 12k - transfuse one unit platelets - anticipate bronch    - Coags pending - TTP common with HIV     Transfuse platelets 2/1       Elevated LFTs    Not sure of cause- from ETOH? Vs other - + HIV/sepsis   Hepatitis panel negative  No abdominal pain and bilirubin not suggesting obstructive pattern or HIV cholangiopathy          Malnutrition of moderate degree    Pt did not comment on changes in diet  + loose stools         Tobacco abuse    Smoking cessation education   Nicotine patch offered        Hyponatremia    Likely from lung involvement ?  SIADH?    Improved with IVF       Weakness    Will consider PT/OT consult when stable        Thrush    Started on diflucan        Pneumonia, unspecified organism    With hypoxia and sepsis. Will have broad spectrum Abx + treatment for PJP.  +/- steroids. Will discuss with ID.   Rocephin dc'd by ID  Bactrim - probable PJP - concern with low platelets that alternative may be indicated if continues to drop - appreciate hematology input and recs   pulm consulted -  should have bronch for PJP, TB concerns  Wean oxygen as tolerated  Vent management per pulmonary              VTE Risk Mitigation (From admission, onward)        Ordered     IP VTE LOW RISK PATIENT  Once      01/28/19 1927     Place DENY hose  Until discontinued      01/28/19 1927          Critical care time spent on the evaluation and treatment of severe organ dysfunction, review of pertinent labs and imaging studies, discussions with consulting providers and discussions with patient/family: 40 minutes.    Nhung Carter MD  Department of Hospital Medicine   [facility]

## 2019-02-02 NOTE — SUBJECTIVE & OBJECTIVE
Interval History: through language line, pt was told that illness is stable and trying to wean oxygen down    Review of Systems   HENT: Positive for congestion.    Eyes: Negative.    Respiratory: Positive for cough and shortness of breath.    Cardiovascular: Negative.    Gastrointestinal: Negative.    Genitourinary: Negative.    Musculoskeletal: Negative.    Neurological: Positive for weakness.   Psychiatric/Behavioral: The patient is nervous/anxious.      Objective:     Vital Signs (Most Recent):  Temp: 97.7 °F (36.5 °C) (02/02/19 1100)  Pulse: 104 (02/02/19 1300)  Resp: 20 (02/02/19 1300)  BP: 123/76 (02/02/19 1300)  SpO2: (!) 91 % (02/02/19 1300) Vital Signs (24h Range):  Temp:  [96.9 °F (36.1 °C)-97.7 °F (36.5 °C)] 97.7 °F (36.5 °C)  Pulse:  [] 104  Resp:  [14-52] 20  SpO2:  [88 %-100 %] 91 %  BP: ()/(55-94) 123/76     Weight: 60.2 kg (132 lb 11.5 oz)  Body mass index is 21.42 kg/m².    Intake/Output Summary (Last 24 hours) at 2/2/2019 1450  Last data filed at 2/2/2019 1215  Gross per 24 hour   Intake 1190 ml   Output 2050 ml   Net -860 ml      Physical Exam   Constitutional: He appears well-developed and well-nourished. No distress.   HENT:   Head: Normocephalic and atraumatic.   Mouth/Throat: Oropharynx is clear and moist.   Neck: Normal range of motion. Neck supple. No JVD present.   Cardiovascular: Regular rhythm and intact distal pulses.   Tachycardia S1 S2       Significant Labs: All pertinent labs within the past 24 hours have been reviewed.    Significant Imaging: I have reviewed and interpreted all pertinent imaging results/findings within the past 24 hours.

## 2019-02-02 NOTE — SUBJECTIVE & OBJECTIVE
Interval History: pt cooperative in bed resting     Review of Systems   HENT: Positive for congestion.    Eyes: Negative.    Respiratory: Positive for cough and shortness of breath.    Cardiovascular: Negative.    Gastrointestinal: Negative.    Genitourinary: Negative.    Musculoskeletal: Negative.    Neurological: Positive for weakness.   Psychiatric/Behavioral: The patient is nervous/anxious.      Objective:     Vital Signs (Most Recent):  Temp: 97.1 °F (36.2 °C) (02/01/19 1550)  Pulse: 100 (02/01/19 1700)  Resp: 20 (02/01/19 1700)  BP: 102/72 (02/01/19 1700)  SpO2: (!) 93 % (02/01/19 1700) Vital Signs (24h Range):  Temp:  [97 °F (36.1 °C)-98.5 °F (36.9 °C)] 97.1 °F (36.2 °C)  Pulse:  [] 100  Resp:  [14-52] 20  SpO2:  [88 %-99 %] 93 %  BP: ()/(52-79) 102/72     Weight: 59.6 kg (131 lb 6.3 oz)  Body mass index is 21.21 kg/m².    Intake/Output Summary (Last 24 hours) at 2/1/2019 1936  Last data filed at 2/1/2019 1616  Gross per 24 hour   Intake 1192.47 ml   Output 1135 ml   Net 57.47 ml      Physical Exam   Constitutional: He appears well-developed and well-nourished. No distress.   HENT:   Head: Normocephalic and atraumatic.   Mouth/Throat: Oropharynx is clear and moist.   Neck: Normal range of motion. Neck supple. No JVD present.   Cardiovascular: Regular rhythm and intact distal pulses.   Tachycardia S1 S2       Significant Labs:   Blood Culture: No results for input(s): LABBLOO in the last 48 hours.  BMP:   Recent Labs   Lab 02/01/19  0621   *      K 4.6      CO2 25   BUN 16   CREATININE 0.7   CALCIUM 7.6*     CBC:   Recent Labs   Lab 01/31/19  0608 02/01/19  0621   WBC 2.62* 2.83*   HGB 8.2* 8.0*   HCT 24.7* 24.3*   PLT 22* 8*       Significant Imaging: I have reviewed all pertinent imaging results/findings within the past 24 hours.

## 2019-02-02 NOTE — PLAN OF CARE
Problem: Adult Inpatient Plan of Care  Goal: Plan of Care Review  Outcome: Ongoing (interventions implemented as appropriate)  Pt continues in the ICU.  Cypriot speaking only.  Pt denies any pain. Normal sinus on the monitor.  Pt voiding without difficulty. No falls/injuries this shift.

## 2019-02-03 LAB
ALBUMIN SERPL BCP-MCNC: 1.7 G/DL
ALP SERPL-CCNC: 286 U/L
ALT SERPL W/O P-5'-P-CCNC: 115 U/L
ANION GAP SERPL CALC-SCNC: 8 MMOL/L
ANISOCYTOSIS BLD QL SMEAR: SLIGHT
AST SERPL-CCNC: 55 U/L
BACTERIA SPEC AEROBE CULT: NO GROWTH
BASOPHILS NFR BLD: 0 %
BILIRUB SERPL-MCNC: 0.3 MG/DL
BUN SERPL-MCNC: 18 MG/DL
CALCIUM SERPL-MCNC: 7.9 MG/DL
CHLORIDE SERPL-SCNC: 101 MMOL/L
CO2 SERPL-SCNC: 26 MMOL/L
CREAT SERPL-MCNC: 0.6 MG/DL
DIFFERENTIAL METHOD: ABNORMAL
EOSINOPHIL NFR BLD: 0 %
ERYTHROCYTE [DISTWIDTH] IN BLOOD BY AUTOMATED COUNT: 15 %
EST. GFR  (AFRICAN AMERICAN): >60 ML/MIN/1.73 M^2
EST. GFR  (NON AFRICAN AMERICAN): >60 ML/MIN/1.73 M^2
GALACTOMANNAN AG SERPL IA-ACNC: >=3.75 INDEX
GLUCOSE SERPL-MCNC: 123 MG/DL
GRAM STN SPEC: NORMAL
HCT VFR BLD AUTO: 25.7 %
HGB BLD-MCNC: 8.3 G/DL
LYMPHOCYTES NFR BLD: 1 %
M TB CMPLX DNA SPEC QL NAA+PROBE: NEGATIVE
MCH RBC QN AUTO: 24.6 PG
MCHC RBC AUTO-ENTMCNC: 32.3 G/DL
MCV RBC AUTO: 76 FL
METAMYELOCYTES NFR BLD MANUAL: 2 %
MONOCYTES NFR BLD: 0 %
MYELOCYTES NFR BLD MANUAL: 1 %
NEUTROPHILS NFR BLD: 63 %
NEUTS BAND NFR BLD MANUAL: 33 %
OVALOCYTES BLD QL SMEAR: ABNORMAL
PLATELET # BLD AUTO: 32 K/UL
PLATELET BLD QL SMEAR: ABNORMAL
PMV BLD AUTO: ABNORMAL FL
POLYCHROMASIA BLD QL SMEAR: ABNORMAL
POTASSIUM SERPL-SCNC: 4.2 MMOL/L
PROT SERPL-MCNC: 5.4 G/DL
RBC # BLD AUTO: 3.38 M/UL
SODIUM SERPL-SCNC: 135 MMOL/L
SPECIMEN SOURCE: NORMAL
WBC # BLD AUTO: 4.42 K/UL

## 2019-02-03 PROCEDURE — 25000242 PHARM REV CODE 250 ALT 637 W/ HCPCS: Performed by: NURSE PRACTITIONER

## 2019-02-03 PROCEDURE — S0039 INJECTION, SULFAMETHOXAZOLE: HCPCS | Performed by: INTERNAL MEDICINE

## 2019-02-03 PROCEDURE — 25000003 PHARM REV CODE 250: Performed by: INTERNAL MEDICINE

## 2019-02-03 PROCEDURE — 99233 SBSQ HOSP IP/OBS HIGH 50: CPT | Mod: ,,, | Performed by: INTERNAL MEDICINE

## 2019-02-03 PROCEDURE — 85007 BL SMEAR W/DIFF WBC COUNT: CPT

## 2019-02-03 PROCEDURE — 27000221 HC OXYGEN, UP TO 24 HOURS

## 2019-02-03 PROCEDURE — 63600175 PHARM REV CODE 636 W HCPCS: Performed by: INTERNAL MEDICINE

## 2019-02-03 PROCEDURE — 99233 PR SUBSEQUENT HOSPITAL CARE,LEVL III: ICD-10-PCS | Mod: ,,, | Performed by: INTERNAL MEDICINE

## 2019-02-03 PROCEDURE — 21400001 HC TELEMETRY ROOM

## 2019-02-03 PROCEDURE — 94640 AIRWAY INHALATION TREATMENT: CPT

## 2019-02-03 PROCEDURE — 25000003 PHARM REV CODE 250: Performed by: EMERGENCY MEDICINE

## 2019-02-03 PROCEDURE — 27100171 HC OXYGEN HIGH FLOW UP TO 24 HOURS

## 2019-02-03 PROCEDURE — 36415 COLL VENOUS BLD VENIPUNCTURE: CPT

## 2019-02-03 PROCEDURE — 99900035 HC TECH TIME PER 15 MIN (STAT)

## 2019-02-03 PROCEDURE — 80053 COMPREHEN METABOLIC PANEL: CPT

## 2019-02-03 PROCEDURE — 85027 COMPLETE CBC AUTOMATED: CPT

## 2019-02-03 RX ORDER — ACETAMINOPHEN 325 MG/1
325 TABLET ORAL EVERY 8 HOURS PRN
Status: DISCONTINUED | OUTPATIENT
Start: 2019-02-03 | End: 2019-02-13 | Stop reason: HOSPADM

## 2019-02-03 RX ADMIN — FAMOTIDINE 20 MG: 20 TABLET ORAL at 09:02

## 2019-02-03 RX ADMIN — IPRATROPIUM BROMIDE AND ALBUTEROL SULFATE 3 ML: .5; 3 SOLUTION RESPIRATORY (INHALATION) at 04:02

## 2019-02-03 RX ADMIN — IPRATROPIUM BROMIDE AND ALBUTEROL SULFATE 3 ML: .5; 3 SOLUTION RESPIRATORY (INHALATION) at 03:02

## 2019-02-03 RX ADMIN — METHYLPREDNISOLONE SODIUM SUCCINATE 60 MG: 125 INJECTION, POWDER, FOR SOLUTION INTRAMUSCULAR; INTRAVENOUS at 12:02

## 2019-02-03 RX ADMIN — IPRATROPIUM BROMIDE AND ALBUTEROL SULFATE 3 ML: .5; 3 SOLUTION RESPIRATORY (INHALATION) at 11:02

## 2019-02-03 RX ADMIN — FLUCONAZOLE, SODIUM CHLORIDE 400 MG: 2 INJECTION INTRAVENOUS at 10:02

## 2019-02-03 RX ADMIN — IPRATROPIUM BROMIDE AND ALBUTEROL SULFATE 3 ML: .5; 3 SOLUTION RESPIRATORY (INHALATION) at 08:02

## 2019-02-03 RX ADMIN — METHYLPREDNISOLONE SODIUM SUCCINATE 60 MG: 125 INJECTION, POWDER, FOR SOLUTION INTRAMUSCULAR; INTRAVENOUS at 06:02

## 2019-02-03 RX ADMIN — SULFAMETHOXAZOLE AND TRIMETHOPRIM 295 MG: 80; 16 INJECTION, SOLUTION, CONCENTRATE INTRAVENOUS at 09:02

## 2019-02-03 RX ADMIN — METHYLPREDNISOLONE SODIUM SUCCINATE 60 MG: 125 INJECTION, POWDER, FOR SOLUTION INTRAMUSCULAR; INTRAVENOUS at 11:02

## 2019-02-03 RX ADMIN — IPRATROPIUM BROMIDE AND ALBUTEROL SULFATE 3 ML: .5; 3 SOLUTION RESPIRATORY (INHALATION) at 07:02

## 2019-02-03 RX ADMIN — SULFAMETHOXAZOLE AND TRIMETHOPRIM 295 MG: 80; 16 INJECTION, SOLUTION, CONCENTRATE INTRAVENOUS at 06:02

## 2019-02-03 RX ADMIN — IPRATROPIUM BROMIDE AND ALBUTEROL SULFATE 3 ML: .5; 3 SOLUTION RESPIRATORY (INHALATION) at 12:02

## 2019-02-03 RX ADMIN — SULFAMETHOXAZOLE AND TRIMETHOPRIM 295 MG: 80; 16 INJECTION, SOLUTION, CONCENTRATE INTRAVENOUS at 12:02

## 2019-02-03 RX ADMIN — FAMOTIDINE 20 MG: 20 TABLET ORAL at 08:02

## 2019-02-03 NOTE — PLAN OF CARE
Problem: Adult Inpatient Plan of Care  Goal: Plan of Care Review  Outcome: Ongoing (interventions implemented as appropriate)  Pt continues in the ICU.  Kosovan speaking only.  Pt denies any pain. Normal sinus on the monitor.  Pt voiding without difficulty. No falls/injuries this shift.

## 2019-02-03 NOTE — PLAN OF CARE
Problem: Adult Inpatient Plan of Care  Goal: Plan of Care Review  Outcome: Ongoing (interventions implemented as appropriate)  Pt remains in ICU on 30L/40% comfort mis.sats in low 90's to 95. Pt has not required bipap mask today. He has had no c/o pain or discomfort. Urine output remains adequate. Pt's appetite has increased. Pt given handouts on diagnosis and all questions answered using language line. Skin remains intact and he has had no falls during this shift

## 2019-02-04 LAB
ALBUMIN SERPL BCP-MCNC: 1.8 G/DL
ALP SERPL-CCNC: 232 U/L
ALT SERPL W/O P-5'-P-CCNC: 91 U/L
ANION GAP SERPL CALC-SCNC: 9 MMOL/L
AST SERPL-CCNC: 28 U/L
BASOPHILS # BLD AUTO: 0.01 K/UL
BASOPHILS NFR BLD: 0.2 %
BILIRUB SERPL-MCNC: 0.3 MG/DL
BUN SERPL-MCNC: 15 MG/DL
CALCIUM SERPL-MCNC: 7.8 MG/DL
CHLORIDE SERPL-SCNC: 101 MMOL/L
CO2 SERPL-SCNC: 24 MMOL/L
CREAT SERPL-MCNC: 0.7 MG/DL
DIFFERENTIAL METHOD: ABNORMAL
EOSINOPHIL # BLD AUTO: 0 K/UL
EOSINOPHIL NFR BLD: 0 %
EPO SERPL-ACNC: 17.2 MIU/ML
ERYTHROCYTE [DISTWIDTH] IN BLOOD BY AUTOMATED COUNT: 15 %
EST. GFR  (AFRICAN AMERICAN): >60 ML/MIN/1.73 M^2
EST. GFR  (NON AFRICAN AMERICAN): >60 ML/MIN/1.73 M^2
GLUCOSE SERPL-MCNC: 217 MG/DL
HCT VFR BLD AUTO: 24.4 %
HGB BLD-MCNC: 7.9 G/DL
LYMPHOCYTES # BLD AUTO: 0.2 K/UL
LYMPHOCYTES NFR BLD: 4 %
MCH RBC QN AUTO: 24.8 PG
MCHC RBC AUTO-ENTMCNC: 32.4 G/DL
MCV RBC AUTO: 77 FL
MONOCYTES # BLD AUTO: 0.2 K/UL
MONOCYTES NFR BLD: 4.5 %
NEUTROPHILS # BLD AUTO: 4.6 K/UL
NEUTROPHILS NFR BLD: 91.3 %
PLATELET # BLD AUTO: 34 K/UL
PLATELET BLD QL SMEAR: ABNORMAL
PMV BLD AUTO: ABNORMAL FL
POTASSIUM SERPL-SCNC: 3.9 MMOL/L
PROT SERPL-MCNC: 5.3 G/DL
RBC # BLD AUTO: 3.18 M/UL
SODIUM SERPL-SCNC: 134 MMOL/L
WBC # BLD AUTO: 5.06 K/UL

## 2019-02-04 PROCEDURE — 36415 COLL VENOUS BLD VENIPUNCTURE: CPT

## 2019-02-04 PROCEDURE — 99233 SBSQ HOSP IP/OBS HIGH 50: CPT | Mod: ,,, | Performed by: PHYSICIAN ASSISTANT

## 2019-02-04 PROCEDURE — 86480 TB TEST CELL IMMUN MEASURE: CPT

## 2019-02-04 PROCEDURE — 94761 N-INVAS EAR/PLS OXIMETRY MLT: CPT

## 2019-02-04 PROCEDURE — 99233 SBSQ HOSP IP/OBS HIGH 50: CPT | Mod: ,,, | Performed by: INTERNAL MEDICINE

## 2019-02-04 PROCEDURE — S0039 INJECTION, SULFAMETHOXAZOLE: HCPCS | Performed by: INTERNAL MEDICINE

## 2019-02-04 PROCEDURE — 21400001 HC TELEMETRY ROOM

## 2019-02-04 PROCEDURE — 87385 HISTOPLASMA CAPSUL AG IA: CPT

## 2019-02-04 PROCEDURE — 25000003 PHARM REV CODE 250: Performed by: INTERNAL MEDICINE

## 2019-02-04 PROCEDURE — 25000003 PHARM REV CODE 250: Performed by: PHYSICIAN ASSISTANT

## 2019-02-04 PROCEDURE — 94640 AIRWAY INHALATION TREATMENT: CPT

## 2019-02-04 PROCEDURE — 27000221 HC OXYGEN, UP TO 24 HOURS

## 2019-02-04 PROCEDURE — 85027 COMPLETE CBC AUTOMATED: CPT

## 2019-02-04 PROCEDURE — 99233 PR SUBSEQUENT HOSPITAL CARE,LEVL III: ICD-10-PCS | Mod: ,,, | Performed by: INTERNAL MEDICINE

## 2019-02-04 PROCEDURE — 99233 PR SUBSEQUENT HOSPITAL CARE,LEVL III: ICD-10-PCS | Mod: ,,, | Performed by: PHYSICIAN ASSISTANT

## 2019-02-04 PROCEDURE — 63600175 PHARM REV CODE 636 W HCPCS: Performed by: INTERNAL MEDICINE

## 2019-02-04 PROCEDURE — 85007 BL SMEAR W/DIFF WBC COUNT: CPT

## 2019-02-04 PROCEDURE — 97162 PT EVAL MOD COMPLEX 30 MIN: CPT

## 2019-02-04 PROCEDURE — 25000003 PHARM REV CODE 250: Performed by: EMERGENCY MEDICINE

## 2019-02-04 PROCEDURE — 25000242 PHARM REV CODE 250 ALT 637 W/ HCPCS: Performed by: NURSE PRACTITIONER

## 2019-02-04 PROCEDURE — 80053 COMPREHEN METABOLIC PANEL: CPT

## 2019-02-04 RX ORDER — PREDNISONE 20 MG/1
60 TABLET ORAL DAILY
Status: DISCONTINUED | OUTPATIENT
Start: 2019-02-05 | End: 2019-02-07

## 2019-02-04 RX ORDER — FLUCONAZOLE 100 MG/1
400 TABLET ORAL DAILY
Status: DISCONTINUED | OUTPATIENT
Start: 2019-02-05 | End: 2019-02-07

## 2019-02-04 RX ORDER — SULFAMETHOXAZOLE AND TRIMETHOPRIM 800; 160 MG/1; MG/1
2 TABLET ORAL 3 TIMES DAILY
Status: DISCONTINUED | OUTPATIENT
Start: 2019-02-04 | End: 2019-02-11

## 2019-02-04 RX ADMIN — IPRATROPIUM BROMIDE AND ALBUTEROL SULFATE 3 ML: .5; 3 SOLUTION RESPIRATORY (INHALATION) at 04:02

## 2019-02-04 RX ADMIN — FAMOTIDINE 20 MG: 20 TABLET ORAL at 10:02

## 2019-02-04 RX ADMIN — FAMOTIDINE 20 MG: 20 TABLET ORAL at 08:02

## 2019-02-04 RX ADMIN — METHYLPREDNISOLONE SODIUM SUCCINATE 60 MG: 125 INJECTION, POWDER, FOR SOLUTION INTRAMUSCULAR; INTRAVENOUS at 06:02

## 2019-02-04 RX ADMIN — IPRATROPIUM BROMIDE AND ALBUTEROL SULFATE 3 ML: .5; 3 SOLUTION RESPIRATORY (INHALATION) at 03:02

## 2019-02-04 RX ADMIN — IPRATROPIUM BROMIDE AND ALBUTEROL SULFATE 3 ML: .5; 3 SOLUTION RESPIRATORY (INHALATION) at 08:02

## 2019-02-04 RX ADMIN — SULFAMETHOXAZOLE AND TRIMETHOPRIM 2 TABLET: 800; 160 TABLET ORAL at 08:02

## 2019-02-04 RX ADMIN — IPRATROPIUM BROMIDE AND ALBUTEROL SULFATE 3 ML: .5; 3 SOLUTION RESPIRATORY (INHALATION) at 07:02

## 2019-02-04 RX ADMIN — IPRATROPIUM BROMIDE AND ALBUTEROL SULFATE 3 ML: .5; 3 SOLUTION RESPIRATORY (INHALATION) at 11:02

## 2019-02-04 RX ADMIN — FLUCONAZOLE, SODIUM CHLORIDE 400 MG: 2 INJECTION INTRAVENOUS at 01:02

## 2019-02-04 RX ADMIN — SULFAMETHOXAZOLE AND TRIMETHOPRIM 2 TABLET: 800; 160 TABLET ORAL at 04:02

## 2019-02-04 RX ADMIN — SULFAMETHOXAZOLE AND TRIMETHOPRIM 295 MG: 80; 16 INJECTION, SOLUTION, CONCENTRATE INTRAVENOUS at 05:02

## 2019-02-04 RX ADMIN — METHYLPREDNISOLONE SODIUM SUCCINATE 60 MG: 125 INJECTION, POWDER, FOR SOLUTION INTRAMUSCULAR; INTRAVENOUS at 05:02

## 2019-02-04 RX ADMIN — METHYLPREDNISOLONE SODIUM SUCCINATE 60 MG: 125 INJECTION, POWDER, FOR SOLUTION INTRAMUSCULAR; INTRAVENOUS at 01:02

## 2019-02-04 NOTE — ASSESSMENT & PLAN NOTE
Likely PJP PNA  --Wean HFNC as tolerates, does not really need BIPAP  --Abx/steroids per ID, would switch to prednisone

## 2019-02-04 NOTE — SUBJECTIVE & OBJECTIVE
Interval History: NEFTALY, dyspnea and cough improving, HFNC being weaned    Objective:     Vital Signs (Most Recent):  Temp: 98.2 °F (36.8 °C) (02/03/19 2114)  Pulse: 106 (02/03/19 2255)  Resp: 18 (02/03/19 2114)  BP: 124/73 (02/03/19 2114)  SpO2: (!) 94 % (02/03/19 2255) Vital Signs (24h Range):  Temp:  [97.4 °F (36.3 °C)-98.4 °F (36.9 °C)] 98.2 °F (36.8 °C)  Pulse:  [] 106  Resp:  [13-38] 18  SpO2:  [91 %-97 %] 94 %  BP: ()/(57-74) 124/73     Weight: 59.1 kg (130 lb 4.7 oz)  Body mass index is 21.03 kg/m².      Intake/Output Summary (Last 24 hours) at 2/3/2019 2302  Last data filed at 2/3/2019 1825  Gross per 24 hour   Intake 1300 ml   Output 1500 ml   Net -200 ml       Physical Exam   Constitutional: He is oriented to person, place, and time. He has a sickly appearance.   emanciated   HENT:   Head: Normocephalic.   Eyes: Conjunctivae and EOM are normal. Pupils are equal, round, and reactive to light. No scleral icterus.   Cardiovascular: Normal rate, regular rhythm and normal heart sounds.   No murmur heard.  Pulmonary/Chest: Effort normal and breath sounds normal. He has no wheezes. He has no rhonchi.   Abdominal: Soft. Bowel sounds are normal. There is no hepatosplenomegaly. There is no tenderness. There is no guarding.   Musculoskeletal: He exhibits no edema.   Neurological: He is alert and oriented to person, place, and time.   Skin: No rash noted. No erythema.       Vents:  Vent Mode: NIV+ PC (02/02/19 0341)  Ventilator Initiated: Yes (01/31/19 1000)  Set Rate: 15 bmp (02/02/19 0341)  Vt Set: 430 mL (02/01/19 0700)  Pressure Support: 7 cmH20 (01/30/19 1111)  PEEP/CPAP: 5 cmH20 (02/02/19 0341)  Oxygen Concentration (%): 30 (02/03/19 2041)  Peak Airway Pressure: 12 cmH2O (02/02/19 0341)  Total Ve: 12.5 mL (02/02/19 0341)  F/VT Ratio<105 (RSBI): (!) 33.28 (02/02/19 0341)    Lines/Drains/Airways     Peripheral Intravenous Line                 Peripheral IV - Single Lumen 01/31/19 1243 Right Upper Arm 3  days         Peripheral IV - Single Lumen 01/31/19 1244 Anterior;Proximal;Right Forearm 3 days                Significant Labs:    CBC/Anemia Profile:  Recent Labs   Lab 02/02/19  0433 02/03/19  0458   WBC 3.97 4.42   HGB 8.1* 8.3*   HCT 25.8* 25.7*   PLT 34* 32*   MCV 76* 76*   RDW 15.1* 15.0*        Chemistries:  Recent Labs   Lab 02/02/19  0433 02/03/19 0458    135*   K 4.4 4.2    101   CO2 26 26   BUN 19 18   CREATININE 0.6 0.6   CALCIUM 7.8* 7.9*   ALBUMIN 1.7* 1.7*   PROT 5.2* 5.4*   BILITOT 0.3 0.3   ALKPHOS 341* 286*   * 115*   * 55*       ABGs:   No results for input(s): PH, PCO2, HCO3, POCSATURATED, BE in the last 48 hours.    Significant Imaging:  CXR: I have reviewed all pertinent results/findings within the past 24 hours and my personal findings are:  no acute changes

## 2019-02-04 NOTE — ASSESSMENT & PLAN NOTE
Not sure of cause- from ETOH? Vs other - + HIV/sepsis   Hepatitis panel negative  No abdominal pain and bilirubin not suggesting obstructive pattern or HIV cholangiopathy     U/s suggest fatty liver

## 2019-02-04 NOTE — PROVIDER TRANSFER
"Mr. Donnelly is a 42 yo M with no reported past medical history- presents with a CC of weakness and some SOB for one week. He notes that he was treated with abx for a "pneumonia" 1/13-1/23.  He was rapid HIV + and CD4 count 11. O2 requirements were high flow and Bipap. Pulmonology intubated and performed bronchoscopy. Chest Ct showed cavitary lesion, TB has been ruled out. Off droplet precautions. ID de-escalated antibiotics to bactrim and fluconazole. Thrush improved. PJP is probable cause of pneumonia, studies pending. Pancytopenia improving. Required 3 units of platelets. Hematology following. Liver enzymes elevated and u/s suggest fatty liver.     Dc planning:  -Final ID recs.  Will need 2 weeks PJP treatment and then start HAART .  Problem is patient not documented and affordable will be problematic. ID gave recs for [facility] assistance program. Will need follow up appt as well.  -wean oxygen   -PT/OT recs     "

## 2019-02-04 NOTE — PROGRESS NOTES
Pt transported to the floor, room 315A, from ICU. Pt placed on Comfort flow, 30% FiO2, 10 L . SANDOVAL CALLAHAN at the bedside, Report given to POLINA Hernández RRT.

## 2019-02-04 NOTE — ASSESSMENT & PLAN NOTE
Pt is unaware of virus and does not want information to be shared with anyone - pt  Has a wife in [place] who is healthy   Pt denies, intercourse x 7 years, no IV drug use, no incarceration, no blood transfusions, professional tattoos with clean needles to his knowledge, no visits to [place] in 7 years, no other sick contacts     ID consulted  - bactrim and fluconazole  CD4 count is 11

## 2019-02-04 NOTE — SUBJECTIVE & OBJECTIVE
Interval History: denies pain.     Review of Systems   Unable to perform ROS: Other     Objective:     Vital Signs (Most Recent):  Temp: 98.5 °F (36.9 °C) (02/04/19 1202)  Pulse: 93 (02/04/19 1202)  Resp: 18 (02/04/19 1202)  BP: 102/62 (02/04/19 1202)  SpO2: 97 % (02/04/19 1202) Vital Signs (24h Range):  Temp:  [97.3 °F (36.3 °C)-98.6 °F (37 °C)] 98.5 °F (36.9 °C)  Pulse:  [] 93  Resp:  [15-24] 18  SpO2:  [92 %-97 %] 97 %  BP: (102-126)/(62-79) 102/62     Weight: 56.9 kg (125 lb 7.1 oz)  Body mass index is 20.25 kg/m².    Estimated Creatinine Clearance: 109.5 mL/min (based on SCr of 0.7 mg/dL).    Physical Exam   Constitutional: He appears cachectic. No distress.   Thin, ill appearing   HENT:   Head: Normocephalic.   Oral thrush noted   Eyes: Pupils are equal, round, and reactive to light.   Cardiovascular: Regular rhythm and normal heart sounds. Exam reveals no friction rub.   No murmur heard.  Pulmonary/Chest: No stridor. No respiratory distress. He has no wheezes.   Abdominal: Soft. He exhibits no distension. There is no tenderness. There is no guarding.   Musculoskeletal: He exhibits no tenderness.   Skin: Skin is warm. He is not diaphoretic. No erythema. No pallor.   Psychiatric: He has a normal mood and affect.   Vitals reviewed.      Significant Labs:   Blood Culture:   Recent Labs   Lab 01/28/19  1023 01/28/19  1028   LABBLOO No growth after 5 days. No growth after 5 days.     CBC:   Recent Labs   Lab 02/03/19  0458 02/04/19  0717   WBC 4.42 5.06   HGB 8.3* 7.9*   HCT 25.7* 24.4*   PLT 32* 34*     CMP:   Recent Labs   Lab 02/03/19 0458 02/04/19 0717   * 134*   K 4.2 3.9    101   CO2 26 24   * 217*   BUN 18 15   CREATININE 0.6 0.7   CALCIUM 7.9* 7.8*   PROT 5.4* 5.3*   ALBUMIN 1.7* 1.8*   BILITOT 0.3 0.3   ALKPHOS 286* 232*   AST 55* 28   * 91*   ANIONGAP 8 9   EGFRNONAA >60 >60     HIV 1/2 Antibodies: No results for input(s): SIO08WVFB in the last 48 hours.  Procalcitonin:  No results for input(s): PROCAL in the last 48 hours.  Quantiferon: No results for input(s): NIL, TBAG, TBAGNIL, MITOGENNIL, TBGOLD in the last 48 hours.  Respiratory Culture:   Recent Labs   Lab 01/31/19  1035   GSRESP <10 epithelial cells per low power field.  Many WBC's  Many budding yeast   RESPIRATORYC No growth     Urine Culture: No results for input(s): LABURIN in the last 4320 hours.  Urine Studies:   Recent Labs   Lab 01/28/19  0734   COLORU Yellow   APPEARANCEUA Clear   PHUR 6.0   SPECGRAV 1.015   PROTEINUA Negative   GLUCUA Negative   KETONESU Negative   BILIRUBINUA Negative   OCCULTUA Negative   NITRITE Negative   UROBILINOGEN 2.0-3.0*   LEUKOCYTESUR Negative   SQUAMEPITHEL 1     Wound Culture: No results for input(s): LABAERO in the last 4320 hours.    Significant Imaging: I have reviewed all pertinent imaging results/findings within the past 24 hours.

## 2019-02-04 NOTE — PLAN OF CARE
Problem: Fall Injury Risk  Goal: Absence of Fall and Fall-Related Injury    Intervention: Identify and Manage Contributors to Fall Injury Risk   02/04/19 0409   Manage Acute Allergic Reaction   Medication Review/Management medications reviewed;high risk medications identified   Identify and Manage Contributors to Fall Injury Risk   Self-Care Promotion BADL personal objects within reach;BADL personal routines maintained     Intervention: Promote Injury-Free Environment   02/04/19 1130 02/04/19 1233   Optimize Lag[place] and Functional Mobility   Environmental Safety Modification --  assistive device/personal items within reach;clutter free environment maintained;room organization consistent;lighting adjusted   Optimize Balance and Safe Activity   Safety Promotion/Fall Prevention bed alarm set;commode/urinal/bedpan at bedside;Fall Risk reviewed with patient/family;lighting adjusted;medications reviewed;nonskid shoes/socks when out of bed;room near unit station;side rails raised x 2;instructed to call staff for mobility --

## 2019-02-04 NOTE — PLAN OF CARE
Problem: Adult Inpatient Plan of Care  Goal: Plan of Care Review  Outcome: Ongoing (interventions implemented as appropriate)  Pt remains in ICU, comfort mis decreased to 20L, pt tolerated well for >3 hours, now at 10L and 40%. Pt has had no c/o pain, urine output has been adequate, skin has remained intact, and no falls experienced on this shift. Pt appetite continues to increase and pt eats nearly 100% of food with each meal

## 2019-02-04 NOTE — SUBJECTIVE & OBJECTIVE
Interval History: pt without new complaints     Review of Systems   HENT: Positive for congestion.    Eyes: Negative.    Respiratory: Positive for cough and shortness of breath.    Cardiovascular: Negative.    Gastrointestinal: Negative.    Genitourinary: Negative.    Musculoskeletal: Negative.    Neurological: Positive for weakness.   Psychiatric/Behavioral: The patient is nervous/anxious.      Objective:     Vital Signs (Most Recent):  Temp: 98.4 °F (36.9 °C) (02/03/19 1500)  Pulse: 107 (02/03/19 1600)  Resp: 18 (02/03/19 1600)  BP: 107/66 (02/03/19 1600)  SpO2: 96 % (02/03/19 1600) Vital Signs (24h Range):  Temp:  [97.4 °F (36.3 °C)-98.4 °F (36.9 °C)] 98.4 °F (36.9 °C)  Pulse:  [] 107  Resp:  [13-38] 18  SpO2:  [86 %-97 %] 96 %  BP: ()/(57-76) 107/66     Weight: 59.1 kg (130 lb 4.7 oz)  Body mass index is 21.03 kg/m².    Intake/Output Summary (Last 24 hours) at 2/3/2019 1804  Last data filed at 2/3/2019 1300  Gross per 24 hour   Intake 1550 ml   Output 1300 ml   Net 250 ml      Physical Exam   Constitutional: He is oriented to person, place, and time. He has a sickly appearance.   emanciated   HENT:   Head: Normocephalic.   Eyes: Conjunctivae and EOM are normal. Pupils are equal, round, and reactive to light. No scleral icterus.   Cardiovascular: Normal rate, regular rhythm and normal heart sounds.   No murmur heard.  Pulmonary/Chest: Effort normal and breath sounds normal. He has no wheezes. He has no rhonchi.   Abdominal: Soft. Bowel sounds are normal. There is no hepatosplenomegaly. There is no tenderness. There is no guarding.   Musculoskeletal: He exhibits no edema.   Neurological: He is alert and oriented to person, place, and time.   Skin: No rash noted. No erythema.       Significant Labs:   Blood Culture: No results for input(s): LABBLOO in the last 48 hours.  BMP:   Recent Labs   Lab 02/03/19  0458   *   *   K 4.2      CO2 26   BUN 18   CREATININE 0.6   CALCIUM 7.9*      CBC:   Recent Labs   Lab 02/02/19  0433 02/03/19  0458   WBC 3.97 4.42   HGB 8.1* 8.3*   HCT 25.8* 25.7*   PLT 34* 32*       Significant Imaging: I have reviewed and interpreted all pertinent imaging results/findings within the past 24 hours.

## 2019-02-04 NOTE — PROGRESS NOTES
[facility]  Infectious Disease  Progress Note    Patient Name: Lior Prado  MRN: [id number]  Admission Date: 1/28/2019  Length of Stay: 7 days  Attending Physician: Mckenzie Rodriguez MD  Primary Care Provider: Primary Doctor No    Isolation Status: No active isolations  Assessment/Plan:      * AIDS    44 y/o Luxembourger speaking male with no significant medical history presents with progressively worsening SOB, cough, weakness and weight loss. Denied hemoptysis or sputum production. He was found to be positive for HIV with CD4 count of 11. ID work up initiated:    Blood cultures 1/28 NGTD  CXR with concerns for PCP pneumonia; CTA shows 1.8 cm irregular, cavitary opacity within the anterior segment of the right upper lobe  Pt now S/p bronchoscopy; AFB cultures NGTD, cytology not sent??  fungitell positive  TB PCR NGTD; quant gold indeterminant; repeated today  Crypto negative  c diff negative; stool culture negative  RPR/toxo negative  Aspergillus antigen positive    Plan:  1. Continue bactrim for suspected PCP pneumonia; would change from IV to PO (2 DS tablets three times daily); monitor thrombocytopenia; improved from a few days ago; can use alternate therapy if platelets decline; G6PD testing pending  2. Hold off on MAC prophylaxis for now but will need to start as outpatient given CD4 of 11  3. F/u on remaining studies  4. Continue fluconazole 400 mg daily- will switch from IV to PO  5. Will need prednisone taper  6. Will hold off on starting antiviral therapy for now as starting medication can cause risk of IRIS; pt will need f/u as outpatient          Case discussed with ID staff, Dr. Poon  Thank you for your consult. I will follow-up with patient. Please contact us if you have any additional questions.  ANN Marc Pager: [phone]  Infectious Disease  [facility]    Subjective:     Principal Problem:AIDS    HPI: 44 y/o Luxembourger speaking male with no significant  medical history presents with progressively worsening SOB and weakness for the past week. Associated symptoms include dizziness. He reports having a cough (mostly at nights) for the past week. Denies hemoptysis or sputum production. He denies having any fever chills or night sweats at home. No n/v/d,dysuria. He reports having dyspnea on exertion and 6kg weight loss. On admission he was found to be febrile and significantly diaphoretic. His HIV rapid test is positive. Concerns for PJP pneumonia. He is thin and ill appearing.    He works as a . He denies incarceration or homelessness. He denies knowing about HIV diagnosis. He smokes, denies IVDU or illicit drug use. He denies exposure to TB. No significant allergies at this time.     Blood cultures NGTD  He is febrile 101 and tachycardic  Interval History: denies pain.     Review of Systems   Unable to perform ROS: Other     Objective:     Vital Signs (Most Recent):  Temp: 98.5 °F (36.9 °C) (02/04/19 1202)  Pulse: 93 (02/04/19 1202)  Resp: 18 (02/04/19 1202)  BP: 102/62 (02/04/19 1202)  SpO2: 97 % (02/04/19 1202) Vital Signs (24h Range):  Temp:  [97.3 °F (36.3 °C)-98.6 °F (37 °C)] 98.5 °F (36.9 °C)  Pulse:  [] 93  Resp:  [15-24] 18  SpO2:  [92 %-97 %] 97 %  BP: (102-126)/(62-79) 102/62     Weight: 56.9 kg (125 lb 7.1 oz)  Body mass index is 20.25 kg/m².    Estimated Creatinine Clearance: 109.5 mL/min (based on SCr of 0.7 mg/dL).    Physical Exam   Constitutional: He appears cachectic. No distress.   Thin, ill appearing   HENT:   Head: Normocephalic.   Oral thrush noted   Eyes: Pupils are equal, round, and reactive to light.   Cardiovascular: Regular rhythm and normal heart sounds. Exam reveals no friction rub.   No murmur heard.  Pulmonary/Chest: No stridor. No respiratory distress. He has no wheezes.   Abdominal: Soft. He exhibits no distension. There is no tenderness. There is no guarding.   Musculoskeletal: He exhibits no tenderness.    Skin: Skin is warm. He is not diaphoretic. No erythema. No pallor.   Psychiatric: He has a normal mood and affect.   Vitals reviewed.      Significant Labs:   Blood Culture:   Recent Labs   Lab 01/28/19  1023 01/28/19  1028   LABBLOO No growth after 5 days. No growth after 5 days.     CBC:   Recent Labs   Lab 02/03/19  0458 02/04/19  0717   WBC 4.42 5.06   HGB 8.3* 7.9*   HCT 25.7* 24.4*   PLT 32* 34*     CMP:   Recent Labs   Lab 02/03/19  0458 02/04/19  0717   * 134*   K 4.2 3.9    101   CO2 26 24   * 217*   BUN 18 15   CREATININE 0.6 0.7   CALCIUM 7.9* 7.8*   PROT 5.4* 5.3*   ALBUMIN 1.7* 1.8*   BILITOT 0.3 0.3   ALKPHOS 286* 232*   AST 55* 28   * 91*   ANIONGAP 8 9   EGFRNONAA >60 >60     HIV 1/2 Antibodies: No results for input(s): BWK50TOSM in the last 48 hours.  Procalcitonin: No results for input(s): PROCAL in the last 48 hours.  Quantiferon: No results for input(s): NIL, TBAG, TBAGNIL, MITOGENNIL, TBGOLD in the last 48 hours.  Respiratory Culture:   Recent Labs   Lab 01/31/19  1035   GSRESP <10 epithelial cells per low power field.  Many WBC's  Many budding yeast   RESPIRATORYC No growth     Urine Culture: No results for input(s): LABURIN in the last 4320 hours.  Urine Studies:   Recent Labs   Lab 01/28/19  0734   COLORU Yellow   APPEARANCEUA Clear   PHUR 6.0   SPECGRAV 1.015   PROTEINUA Negative   GLUCUA Negative   KETONESU Negative   BILIRUBINUA Negative   OCCULTUA Negative   NITRITE Negative   UROBILINOGEN 2.0-3.0*   LEUKOCYTESUR Negative   SQUAMEPITHEL 1     Wound Culture: No results for input(s): LABAERO in the last 4320 hours.    Significant Imaging: I have reviewed all pertinent imaging results/findings within the past 24 hours.

## 2019-02-04 NOTE — PROGRESS NOTES
"[facility]  Progress Note    Patient Name: Lior Prado  MRN: [id number]  Patient Class: IP- Inpatient   Admission Date: 1/28/2019  Length of Stay: 6 days  Attending Physician: Nhung Carter MD  Primary Care Provider: Primary Doctor No        Subjective:     Principal Problem:AIDS    HPI:  Mr. Donnelly is a 42 yo M with no reported past medical history- presents with a CC of weakness and some SOB for one week. He notes that he was treated with abx for a "pneumonia" 1/13-1/23.  He presents to the ED and is found to have thrush. Further, his HIV is +. The patient was started on treatment for PJP pneumonia and ID was consulted. The patient is thin and ill appearing.      Hospital Course:  Mr. Donnelly is a 42 yo M with no reported past medical history- presents with a CC of weakness and some SOB for one week. He notes that he was treated with abx for a "pneumonia" 1/13-1/23.  He presents to the ED and is found to have thrush. Further, his HIV is +. The patient was started on treatment for PJP pneumonia and ID was consulted. The patient had a CTA of his chest as well.     Pt was transferred to ICU due to high amounts of O2 requirements and rising lactate/marginal BP.  Pancytopenia worsening and will need one unit platelet transfused.  On IVF and no indication for vasopressor. Pulmonology consulted for possible bronch in new Dx HIV, concern for PJP. Awaiting HIV workup/CD4, etc. ID consulted and assisting with antibacterial meds. Awaiting cultures.     1/30 CD4 count = 11; Pt O2 requirements going up, no won bipap; platelets 20k and will prepare for intubation for bronch tomorrow; antibiotics managed by ID - currently on bactrim and rocephin treating CAP and probable PJP. Diflucan for OP candida. Pancytopenia likely due to sepsis and possible opportunistic infections. Transaminases and lactate/LDH rising. Sepsis with out shock but prognosis is guarded.     1/31 - s/p " bronch, remains on ventilator   2/1- extubated and was agitated wanting to leave earlier but calmed down and willing to stay for treatment, rapid AFB stain resulted negative today, taken off isolation, awaiting the rest of workup for final treatment plan; if remains stable can transfer to floor soon. bipap PRn use  2/2- droplet precautions dc'd TB negative  2/3- weaning oxygen, went from 30 liters to 20 liters on comfort flow.  Awaiting bronch results. On bactrim for presumed PJP. Diflucan for thrush. Pancytopenia improved. Liver enzymes elevated and u/s suggest fatty liver with trace ascites.     Interval History: pt without new complaints     Review of Systems   HENT: Positive for congestion.    Eyes: Negative.    Respiratory: Positive for cough and shortness of breath.    Cardiovascular: Negative.    Gastrointestinal: Negative.    Genitourinary: Negative.    Musculoskeletal: Negative.    Neurological: Positive for weakness.   Psychiatric/Behavioral: The patient is nervous/anxious.      Objective:     Vital Signs (Most Recent):  Temp: 98.4 °F (36.9 °C) (02/03/19 1500)  Pulse: 107 (02/03/19 1600)  Resp: 18 (02/03/19 1600)  BP: 107/66 (02/03/19 1600)  SpO2: 96 % (02/03/19 1600) Vital Signs (24h Range):  Temp:  [97.4 °F (36.3 °C)-98.4 °F (36.9 °C)] 98.4 °F (36.9 °C)  Pulse:  [] 107  Resp:  [13-38] 18  SpO2:  [86 %-97 %] 96 %  BP: ()/(57-76) 107/66     Weight: 59.1 kg (130 lb 4.7 oz)  Body mass index is 21.03 kg/m².    Intake/Output Summary (Last 24 hours) at 2/3/2019 1804  Last data filed at 2/3/2019 1300  Gross per 24 hour   Intake 1550 ml   Output 1300 ml   Net 250 ml      Physical Exam   Constitutional: He is oriented to person, place, and time. He has a sickly appearance.   emanciated   HENT:   Head: Normocephalic.   Eyes: Conjunctivae and EOM are normal. Pupils are equal, round, and reactive to light. No scleral icterus.   Cardiovascular: Normal rate, regular rhythm and normal heart sounds.   No  murmur heard.  Pulmonary/Chest: Effort normal and breath sounds normal. He has no wheezes. He has no rhonchi.   Abdominal: Soft. Bowel sounds are normal. There is no hepatosplenomegaly. There is no tenderness. There is no guarding.   Musculoskeletal: He exhibits no edema.   Neurological: He is alert and oriented to person, place, and time.   Skin: No rash noted. No erythema.       Significant Labs:   Blood Culture: No results for input(s): LABBLOO in the last 48 hours.  BMP:   Recent Labs   Lab 02/03/19  0458   *   *   K 4.2      CO2 26   BUN 18   CREATININE 0.6   CALCIUM 7.9*     CBC:   Recent Labs   Lab 02/02/19  0433 02/03/19  0458   WBC 3.97 4.42   HGB 8.1* 8.3*   HCT 25.8* 25.7*   PLT 34* 32*       Significant Imaging: I have reviewed and interpreted all pertinent imaging results/findings within the past 24 hours.    Assessment/Plan:      * AIDS      Pt is unaware of virus and does not want information to be shared with anyone - pt  Has a wife in [place] who is healthy   Pt denies, intercourse x 7 years, no IV drug use, no incarceration, no blood transfusions, professional tattoos with clean needles to his knowledge, no visits to [place] in 7 years, no other sick contacts     ID consulted  - bactrim and fluconazole  CD4 count is 11       Acute hypoxemic respiratory failure    Secondary to probable CAP and/or PJP  TB ruled out  Appreciate pulmonology recs   S/p bronch  IV steroids, nebs,BActrim, fluconazole    Extubated 2/1  Weaning oxygen - comfort flow at 20 liters, bipap PRN         Pancytopenia    Secondary to HIV/?AIDS  Thrombocytopenia 12k - transfuse one unit platelets - anticipate bronch    - Coags pending - TTP common with HIV     Transfuse platelets 2/1       Elevated LFTs    Not sure of cause- from ETOH? Vs other - + HIV/sepsis   Hepatitis panel negative  No abdominal pain and bilirubin not suggesting obstructive pattern or HIV cholangiopathy     U/s suggest fatty liver           Malnutrition of moderate degree    Pt did not comment on changes in diet  + loose stools         Tobacco abuse    Smoking cessation education   Nicotine patch offered        Hyponatremia    Likely from lung involvement ?  SIADH?    Improved with IVF       Weakness    PT/OT consult for dc planning        Thrush    Started on diflucan        Pneumonia, unspecified organism    With hypoxia and sepsis.Presumed PJP insetting of AIDS  Rocephin dc'd by ID  Bactrim continued   Wean oxygen as tolerated  IV steroids and nebs                VTE Risk Mitigation (From admission, onward)        Ordered     IP VTE LOW RISK PATIENT  Once      01/28/19 1927     Place DENY hose  Until discontinued      01/28/19 1927          Critical care time spent on the evaluation and treatment of severe organ dysfunction, review of pertinent labs and imaging studies, discussions with consulting providers and discussions with patient/family: 40 minutes.    Nhung Carter MD  Department of Hospital Medicine   [facility]

## 2019-02-04 NOTE — NURSING
Hand off received from SANDOVAL Leonard.     Received patient from ICU, per wheelchair, on comfort flow at 30% FI02 10L. Denies complaints, not in any signs of distress. Assessment done. Safety precautions maintained. Call light within reach. Will continue to monitor.

## 2019-02-04 NOTE — PROGRESS NOTES
[facility]  Pulmonology  Progress Note    Patient Name: Lior Prado  MRN: [id number]  Admission Date: 1/28/2019  Hospital Length of Stay: 6 days  Code Status: Full Code  Attending Provider: Nhung Carter MD  Primary Care Provider: Primary Doctor No   Principal Problem: AIDS    Subjective:     Interval History: NEFTALY, dyspnea and cough improving, HFNC being weaned    Objective:     Vital Signs (Most Recent):  Temp: 98.2 °F (36.8 °C) (02/03/19 2114)  Pulse: 106 (02/03/19 2255)  Resp: 18 (02/03/19 2114)  BP: 124/73 (02/03/19 2114)  SpO2: (!) 94 % (02/03/19 2255) Vital Signs (24h Range):  Temp:  [97.4 °F (36.3 °C)-98.4 °F (36.9 °C)] 98.2 °F (36.8 °C)  Pulse:  [] 106  Resp:  [13-38] 18  SpO2:  [91 %-97 %] 94 %  BP: ()/(57-74) 124/73     Weight: 59.1 kg (130 lb 4.7 oz)  Body mass index is 21.03 kg/m².      Intake/Output Summary (Last 24 hours) at 2/3/2019 2302  Last data filed at 2/3/2019 1825  Gross per 24 hour   Intake 1300 ml   Output 1500 ml   Net -200 ml       Physical Exam   Constitutional: He is oriented to person, place, and time. He has a sickly appearance.   emanciated   HENT:   Head: Normocephalic.   Eyes: Conjunctivae and EOM are normal. Pupils are equal, round, and reactive to light. No scleral icterus.   Cardiovascular: Normal rate, regular rhythm and normal heart sounds.   No murmur heard.  Pulmonary/Chest: Effort normal and breath sounds normal. He has no wheezes. He has no rhonchi.   Abdominal: Soft. Bowel sounds are normal. There is no hepatosplenomegaly. There is no tenderness. There is no guarding.   Musculoskeletal: He exhibits no edema.   Neurological: He is alert and oriented to person, place, and time.   Skin: No rash noted. No erythema.       Vents:  Vent Mode: NIV+ PC (02/02/19 0341)  Ventilator Initiated: Yes (01/31/19 1000)  Set Rate: 15 bmp (02/02/19 0341)  Vt Set: 430 mL (02/01/19 0700)  Pressure Support: 7 cmH20 (01/30/19 1111)  PEEP/CPAP: 5 cmH20  (02/02/19 0341)  Oxygen Concentration (%): 30 (02/03/19 2041)  Peak Airway Pressure: 12 cmH2O (02/02/19 0341)  Total Ve: 12.5 mL (02/02/19 0341)  F/VT Ratio<105 (RSBI): (!) 33.28 (02/02/19 0341)    Lines/Drains/Airways     Peripheral Intravenous Line                 Peripheral IV - Single Lumen 01/31/19 1243 Right Upper Arm 3 days         Peripheral IV - Single Lumen 01/31/19 1244 Anterior;Proximal;Right Forearm 3 days                Significant Labs:    CBC/Anemia Profile:  Recent Labs   Lab 02/02/19 0433 02/03/19 0458   WBC 3.97 4.42   HGB 8.1* 8.3*   HCT 25.8* 25.7*   PLT 34* 32*   MCV 76* 76*   RDW 15.1* 15.0*        Chemistries:  Recent Labs   Lab 02/02/19 0433 02/03/19 0458    135*   K 4.4 4.2    101   CO2 26 26   BUN 19 18   CREATININE 0.6 0.6   CALCIUM 7.8* 7.9*   ALBUMIN 1.7* 1.7*   PROT 5.2* 5.4*   BILITOT 0.3 0.3   ALKPHOS 341* 286*   * 115*   * 55*       ABGs:   No results for input(s): PH, PCO2, HCO3, POCSATURATED, BE in the last 48 hours.    Significant Imaging:  CXR: I have reviewed all pertinent results/findings within the past 24 hours and my personal findings are:  no acute changes    Assessment/Plan:     Acute hypoxemic respiratory failure    Likely PJP PNA  --Wean HFNC as tolerates, does not really need BIPAP  --Abx/steroids per ID, would switch to prednisone              Raz Palacios MD  Pulmonology  [facility] [place], [place]

## 2019-02-04 NOTE — PLAN OF CARE
Problem: Fall Injury Risk  Goal: Absence of Fall and Fall-Related Injury  Outcome: Ongoing (interventions implemented as appropriate)  Intervention: Identify and Manage Contributors to Fall Injury Risk   02/04/19 0409   Manage Acute Allergic Reaction   Medication Review/Management medications reviewed;high risk medications identified   Identify and Manage Contributors to Fall Injury Risk   Self-Care Promotion BADL personal objects within reach;BADL personal routines maintained         Problem: Skin Injury Risk Increased  Goal: Skin Health and Integrity  Outcome: Ongoing (interventions implemented as appropriate)  Intervention: Optimize Skin Protection   02/04/19 0409   Prevent Additional Skin Injury   Head of Bed (HOB) HOB at 30-45 degrees   Pressure Reduction Techniques rest period provided between sit times   Monitor and Manage Hypervolemia   Skin Protection tubing/devices free from skin contact;incontinence pads utilized

## 2019-02-04 NOTE — PROGRESS NOTES
[facility]  Hematology/Oncology  Progress Note    Patient Name: Lior Prado  Admission Date: 1/28/2019  Hospital Length of Stay: 7 days  Code Status: Full Code     Subjective:     HPI:  Pt is a Beninese speaking 42 y/o male with no pmhx presented with generalized weakness and increasing SOB and cough x 1 week.  No hemoptysis/chest pain. Patient reports he has been losing weight.  He also reports fevers night sweats for the past week.  In the ED patient was also diagnosed with thrush.  He is HIV positive.  He was started on treatment for  PJP pneumonia. Pt in ICU for 02 requirements secondary to hypoxia.  CBC on admit 01/28/2018 reveals white blood cell count of 3.57 hemoglobin of 10.5 grams/deciliter hematocrit 30.7% and a platelet count of 75k.  This platelet count trending during hospitalization to 12 K.  Patient has undergone platelet transfusion with recent platelet counts of 20 K. No active bleeding.  Consulted for thrombocytopenia.      Interval History : Pt OOB in chair in no distress.     Oncology Treatment Plan:   [No treatment plan]    Medications:  Continuous Infusions:  Scheduled Meds:   albuterol-ipratropium  3 mL Nebulization Q4H    famotidine  20 mg Oral BID    fluconazole (DIFLUCAN) IVPB  400 mg Intravenous Q24H    methylPREDNISolone sodium succinate  60 mg Intravenous Q6H    trimethoprim-sulfamethoxasole (BACTRIM) IVPB (custom)  295 mg Intravenous Q8H     PRN Meds:acetaminophen, ondansetron     Review of patient's allergies indicates:  No Known Allergies     Past Medical History:   Diagnosis Date    Cigarette smoker     HIV (human immunodeficiency virus infection) 01/28/2019    Pneumonia 01/28/2019    Unintentional weight loss 01/28/2019    Weakness 01/28/2019     Past Surgical History:   Procedure Laterality Date    APPENDECTOMY      Bronchoscopy N/A 1/31/2019    Performed by Gigi Powers MD at [facility] ENDO     Family History     Problem Relation (Age of  Onset)    No Known Problems Mother, Father        Tobacco Use    Smoking status: Current Every Day Smoker     Packs/day: 1.00    Smokeless tobacco: Never Used   Substance and Sexual Activity    Alcohol use: Yes    Drug use: No    Sexual activity: Yes     Partners: Female       Review of Systems  Objective:     Vital Signs (Most Recent):  Temp: 98.5 °F (36.9 °C) (02/04/19 1202)  Pulse: 93 (02/04/19 1202)  Resp: 18 (02/04/19 1202)  BP: 102/62 (02/04/19 1202)  SpO2: 97 % (02/04/19 1202) Vital Signs (24h Range):  Temp:  [97.3 °F (36.3 °C)-98.6 °F (37 °C)] 98.5 °F (36.9 °C)  Pulse:  [] 93  Resp:  [15-24] 18  SpO2:  [92 %-97 %] 97 %  BP: (102-126)/(62-79) 102/62     Weight: 56.9 kg (125 lb 7.1 oz)  Body mass index is 20.25 kg/m².  Body surface area is 1.63 meters squared.      Intake/Output Summary (Last 24 hours) at 2/4/2019 1242  Last data filed at 2/4/2019 1022  Gross per 24 hour   Intake 980 ml   Output 3030 ml   Net -2050 ml       Physical Exam   Constitutional: He has a sickly appearance.   HENT:   Head: Normocephalic.   Eyes: Conjunctivae are normal. No scleral icterus.   Cardiovascular: Normal rate, regular rhythm and normal heart sounds.   No murmur heard.  Pulmonary/Chest: Effort normal and breath sounds normal. He has no wheezes. He has no rhonchi.   Abdominal: Soft. Bowel sounds are normal. There is no hepatosplenomegaly. There is no tenderness. There is no guarding.   Musculoskeletal: He exhibits no edema.   Neurological: He is alert.   Skin: No rash noted. No erythema.       Significant Labs:   All pertinent labs within the past 24 hours have been reviewed    Diagnostic Results:  I have reviewed and interpreted all pertinent imaging results/findings within the past 24 hours    Assessment/Plan:     * AIDS    Followed by ID   CD4 count 11  Pt undergoing IV Bactrim for PJP pneumonia prophylaxis.   HAART therapy planned in future       Acute hypoxemic respiratory failure    Pulm following  Pt treated  with  antibiotics including bactrim and  steroids for suspected PJP and PNA  concerns for TB  CTA shows 1.6 cm cavitation lesion of RUL  S/p FOB  - cultures pending  Pt extubated       Pancytopenia    Pancytopenia - multifactorial  Pt possibly with baseline pancytopenia HIV induced with worsening thrombocytopenia- drug induced +/-infection  Worsening thrombocytopenia possibly related to Bactrim   Plt ct 34k - cont to monitor , no bleeding    .     Elevated LFTs    Could be HIV-related  Abdominal Ultrasound study reveals fatty liver             Caro Rendon MD  Hematology/Oncology  [facility] [place]

## 2019-02-04 NOTE — PT/OT/SLP EVAL
Physical Therapy Evaluation    Patient Name:  Lior Prado   MRN:  [id number]    Recommendations:     Discharge Recommendations:  (outpatient PT services)   Discharge Equipment Recommendations: none   Barriers to discharge: Pt on high flow O2 at this time.    Assessment:     Lior Prado is a 43 y.o. male admitted with a medical diagnosis of AIDS.  He presents with the following impairments/functional limitations:  weakness, impaired cardiopulmonary response to activity, impaired endurance.    Rehab Prognosis: Fair+; patient would benefit from acute skilled PT services to address these deficits and reach maximum level of function.    Recent Surgery: Procedure(s) (LRB):  Bronchoscopy (N/A) 4 Days Post-Op    Plan:     During this hospitalization, patient to be seen 3 x/week(M-F) to address the identified rehab impairments via gait training, therapeutic activities, therapeutic exercises and progress toward the following goals:    · Plan of Care Expires:  02/18/19    Subjective     Chief Complaint: N/A  Patient/Family Comments/goals: Pt did not state.   Pain/Comfort:  · Pain Rating 1: 0/10    Living Environment:  Per chart, pt lives with friends.    Prior to admission, patients level of function was independent.  Equipment used at home: none.  Upon discharge, patient will have assistance from friends (?).    Objective:     Communicated with nurse Alegria prior to session.  Patient found in bed with all lines intact and call button in reach oxygen, peripheral IV, telemetry  upon PT entry to room.    General Precautions: Standard, fall, respiratory   Orthopedic Precautions:N/A   Braces: N/A     Exams:  · Cognitive Exam:  Patient was able to follow commands.  Pt speaks mostly German.    · Gross Motor Coordination:  WFL  · Postural Exam:  Patient presented with the following abnormalities:    · -       No postural abnormalities identified  · Skin Integrity/Edema:      · -       Skin integrity: Visible skin  intact  · -       Edema: None noted BLE  · BLE ROM: WFL  · BLE Strength: WFL    Functional Mobility:  · Bed Mobility:     · Scooting: supervision  · Supine to Sit: supervision with HOB elevated   · Transfers:     · Sit to Stand:  CGA-stand by assistance with no AD  · Bed to Chair: CGA-stand by assistance with  no AD  using  Step Transfer  · Gait: Pt ambulated ~3-4 steps from bed>chair with CGA-SBA using no AD and on high flow O2.  Pt with no major gait deviations.  Gait limited 2* high flow O2.    · Balance: Pt with fair+ balance.       Therapeutic Activities and Exercises:  BLE standing therex 10 reps: marches and calf raises    BLE seated therex 10 reps: marches, LAQ, and AP    AM-PAC 6 CLICK MOBILITY  Total Score:22     Patient left up in chair with all lines intact, call button in reach and nurse Airam notified.    GOALS:   Multidisciplinary Problems     Physical Therapy Goals        Problem: Physical Therapy Goal    Goal Priority Disciplines Outcome Goal Variances Interventions   Physical Therapy Goal     PT, PT/OT      Description:  Goals to be met by: 19     Patient will increase functional independence with mobility by performin. Supine to sit with [place]  2. Rolling to Left and Right with [place]  3. Sit to stand transfer with [place]  4. Bed to chair transfer with [place]   5. Gait  >250 feet with [place]   6. Upper/Lower extremity exercise program 3 sets x10 reps per handout, with independence                      History:     Past Medical History:   Diagnosis Date    Cigarette smoker     HIV (human immunodeficiency virus infection) 2019    Pneumonia 2019    Unintentional weight loss 2019    Weakness 2019       Past Surgical History:   Procedure Laterality Date    APPENDECTOMY      Bronchoscopy N/A 2019    Performed by Gigi Powers MD at [facility] ENDO       Clinical Decision Making:     History  Co-morbidities and personal factors that  may impact the plan of care Examination  Body Structures and Functions, activity limitations and participation restrictions that may impact the plan of care Clinical Presentation   Decision Making/ Complexity Score   Co-morbidities:   [] Time since onset of injury / illness / exacerbation  [x] Status of current condition  []Patient's cognitive status and safety concerns    [x] Multiple Medical Problems (see med hx)  Personal Factors:   [] Patient's age  [] Prior Level of function   [x] Patient's home situation (environment and family support)  [] Patient's level of motivation  [] Expected progression of patient      HISTORY:(criteria)    [] 32209 - no personal factors/history    [] 52533 - has 1-2 personal factor/comorbidity     [x] 75952 - has >3 personal factor/comorbidity     Body Regions:  [x] Objective examination findings  [] Head     []  Neck  [] Trunk   [] Upper Extremity  [x] Lower Extremity    Body Systems:  [x] For communication ability, affect, cognition, language, and learning style: the assessment of the ability to make needs known, consciousness, orientation (person, place, and time), expected emotional /behavioral responses, and learning preferences (eg, learning barriers, education  needs)  [x] For the neuromuscular system: a general assessment of gross coordinated movement (eg, balance, gait, locomotion, transfers, and transitions) and motor function  (motor control and motor learning)  [x] For the musculoskeletal system: the assessment of gross symmetry, gross range of motion, gross strength, height, and weight  [] For the integumentary system: the assessment of pliability(texture), presence of scar formation, skin color, and skin integrity  [x] For cardiovascular/pulmonary system: the assessment of heart rate, respiratory rate, blood pressure, and edema     Activity limitations:    [] Patient's cognitive status and saf ety concerns          [x] Status of current condition      [] Weight bearing  restriction  [x] Cardiopulmunary Restriction    Participation Restrictions:   [] Goals and goal agreement with the patient     [] Rehab potential (prognosis) and probable outcome      Examination of Body System: (criteria)    [] 69093 - addressing 1-2 elements    [] 95999 - addressing a total of 3 or more elements     [x] 00852 -  Addressing a total of 4 or more elements         Clinical Presentation: (criteria)  Evolving - 36393     On examination of body system using standardized tests and measures patient presents with 4 or more elements from any of the following: body structures and functions, activity limitations, and/or participation restrictions.  Leading to a clinical presentation that is considered evolving with changing characteristics                              Clinical Decision Making  (Eval Complexity):  Moderate - 53991     Time Tracking:     PT Received On: 02/04/19  PT Start Time: 1134     PT Stop Time: 1147  PT Total Time (min): 13 min     Billable Minutes: Evaluation 13 min      Lakisha Mcgregor, PT  02/04/2019

## 2019-02-04 NOTE — PLAN OF CARE
Problem: Physical Therapy Goal  Goal: Physical Therapy Goal  Goals to be met by: 19     Patient will increase functional independence with mobility by performin. Supine to sit with [place]  2. Rolling to Left and Right with [place]  3. Sit to stand transfer with [place]  4. Bed to chair transfer with [place]   5. Gait  >250 feet with [place]   6. Upper/Lower extremity exercise program 3 sets x10 reps per handout, with independence    Pt OOB>chair this am, gait limited 2* pt on high flow O2 at this time.

## 2019-02-04 NOTE — ASSESSMENT & PLAN NOTE
With hypoxia and sepsis.Presumed PJP insetting of AIDS  Rocephin dc'd by ID  Bactrim continued   Wean oxygen as tolerated  IV steroids and nebs

## 2019-02-04 NOTE — ASSESSMENT & PLAN NOTE
Secondary to probable CAP and/or PJP  TB ruled out  Appreciate pulmonology recs   S/p bronch  IV steroids, nebs,BActrim, fluconazole    Extubated 2/1  Weaning oxygen - comfort flow at 20 liters, bipap PRN

## 2019-02-04 NOTE — ASSESSMENT & PLAN NOTE
Pancytopenia - multifactorial  Pt possibly with baseline pancytopenia HIV induced with worsening thrombocytopenia- drug induced +/-infection    His thrombocytopenia is expected to improved once he starts Anti-Retroviral Therapy.

## 2019-02-04 NOTE — NURSING
Bedside hand off given to morning shift. Denies needs, no complaints, not in any signs of distress.

## 2019-02-04 NOTE — ASSESSMENT & PLAN NOTE
42 y/o Grenadian speaking male with no significant medical history presents with progressively worsening SOB, cough, weakness and weight loss. Denied hemoptysis or sputum production. He was found to be positive for HIV with CD4 count of 11. ID work up initiated:    Blood cultures 1/28 NGTD  CXR with concerns for PCP pneumonia; CTA shows 1.8 cm irregular, cavitary opacity within the anterior segment of the right upper lobe  Pt now S/p bronchoscopy; AFB cultures NGTD, cytology not sent??  fungitell positive  TB PCR NGTD; quant gold indeterminant; repeated today  Crypto negative  c diff negative; stool culture negative  RPR/toxo negative  Aspergillus antigen positive    Plan:  1. Continue bactrim for suspected PCP pneumonia; would change from IV to PO (2 DS tablets three times daily); monitor thrombocytopenia; improved from a few days ago; can use alternate therapy if platelets decline; G6PD testing pending  2. Hold off on MAC prophylaxis for now but will need to start as outpatient given CD4 of 11  3. F/u on remaining studies  4. Continue fluconazole 400 mg daily- will switch from IV to PO  5. Will need prednisone taper  6. Will hold off on starting antiviral therapy for now as starting medication can cause risk of IRIS; pt will need f/u as outpatient

## 2019-02-04 NOTE — SUBJECTIVE & OBJECTIVE
Oncology Treatment Plan:   [No treatment plan]    Medications:  Continuous Infusions:  Scheduled Meds:   albuterol-ipratropium  3 mL Nebulization Q4H    famotidine  20 mg Oral BID    fluconazole (DIFLUCAN) IVPB  400 mg Intravenous Q24H    methylPREDNISolone sodium succinate  60 mg Intravenous Q6H    trimethoprim-sulfamethoxasole (BACTRIM) IVPB (custom)  295 mg Intravenous Q8H     PRN Meds:acetaminophen, ondansetron     Review of patient's allergies indicates:  No Known Allergies     Past Medical History:   Diagnosis Date    Cigarette smoker     HIV (human immunodeficiency virus infection) 01/28/2019    Pneumonia 01/28/2019    Unintentional weight loss 01/28/2019    Weakness 01/28/2019     Past Surgical History:   Procedure Laterality Date    APPENDECTOMY      Bronchoscopy N/A 1/31/2019    Performed by G[facility] Powers [facility] at [facility] ENDO     Family History     Problem Relation (Age of Onset)    No Known Problems Mother, Father        Tobacco Use    Smoking status: Current Every Day Smoker     Packs/day: 1.00    Smokeless tobacco: Never Used   Substance and Sexual Activity    Alcohol use: Yes    Drug use: No    Sexual activity: Yes     Partners: Female       Review of Systems  Objective:     Vital Signs (Most Recent):  Temp: 98.5 °F (36.9 °C) (02/04/19 1202)  Pulse: 93 (02/04/19 1202)  Resp: 18 (02/04/19 1202)  BP: 102/62 (02/04/19 1202)  SpO2: 97 % (02/04/19 1202) Vital Signs (24h Range):  Temp:  [97.3 °F (36.3 °C)-98.6 °F (37 °C)] 98.5 °F (36.9 °C)  Pulse:  [] 93  Resp:  [15-24] 18  SpO2:  [92 %-97 %] 97 %  BP: (102-126)/(62-79) 102/62     Weight: 56.9 kg (125 lb 7.1 oz)  Body mass index is 20.25 kg/m².  Body surface area is 1.63 meters squared.      Intake/Output Summary (Last 24 hours) at 2/4/2019 1242  Last data filed at 2/4/2019 1022  Gross per 24 hour   Intake 980 ml   Output 3030 ml   Net -2050 ml       Physical Exam   Constitutional: He has a sickly appearance.   HENT:   Head: Normocephalic.    Eyes: Conjunctivae are normal. No scleral icterus.   Cardiovascular: Normal rate, regular rhythm and normal heart sounds.   No murmur heard.  Pulmonary/Chest: Effort normal and breath sounds normal. He has no wheezes. He has no rhonchi.   Abdominal: Soft. Bowel sounds are normal. There is no hepatosplenomegaly. There is no tenderness. There is no guarding.   Musculoskeletal: He exhibits no edema.   Neurological: He is alert.   Skin: No rash noted. No erythema.       Significant Labs:   All pertinent labs within the past 24 hours have been reviewed    Diagnostic Results:  I have reviewed and interpreted all pertinent imaging results/findings within the past 24 hours

## 2019-02-04 NOTE — NURSING
Report received from SANDOVAL Salgado. Patient resting comfortably, no complaints, no acute distress noted. Will continue to monitor.

## 2019-02-04 NOTE — NURSING TRANSFER
Nursing Transfer Note      2/4/2019     Transfer {TRANSFER TO 314B/FROM:   Transfer via wheelchair    Transfer with   O2, cardiac monitoring    Transported by DEMETRA Mora RN    Medicines sent: yes    Chart send with patient: Yes    Notified: friend    Patient reassessed at: 02/03/2019 at 2000    Upon arrival to floor: cardiac monitor applied, patient oriented to room, call bell in reach and bed in lowest position    Report called to Naomi and nurse notified upon arrival

## 2019-02-05 LAB
ALBUMIN SERPL BCP-MCNC: 2 G/DL
ALP SERPL-CCNC: 254 U/L
ALT SERPL W/O P-5'-P-CCNC: 86 U/L
ANION GAP SERPL CALC-SCNC: 7 MMOL/L
ANISOCYTOSIS BLD QL SMEAR: SLIGHT
APTT HEX PL PPP: POSITIVE S
AST SERPL-CCNC: 25 U/L
BASOPHILS # BLD AUTO: 0.02 K/UL
BASOPHILS NFR BLD: 0.4 %
BILIRUB SERPL-MCNC: 0.3 MG/DL
BUN SERPL-MCNC: 14 MG/DL
CALCIUM SERPL-MCNC: 8.3 MG/DL
CHLORIDE SERPL-SCNC: 102 MMOL/L
CO2 SERPL-SCNC: 25 MMOL/L
CREAT SERPL-MCNC: 0.6 MG/DL
DIFFERENTIAL METHOD: ABNORMAL
ENTEROVIRUS: NOT DETECTED
EOSINOPHIL # BLD AUTO: 0 K/UL
EOSINOPHIL NFR BLD: 0 %
ERYTHROCYTE [DISTWIDTH] IN BLOOD BY AUTOMATED COUNT: 15.6 %
EST. GFR  (AFRICAN AMERICAN): >60 ML/MIN/1.73 M^2
EST. GFR  (NON AFRICAN AMERICAN): >60 ML/MIN/1.73 M^2
GIANT PLATELETS BLD QL SMEAR: PRESENT
GLUCOSE SERPL-MCNC: 126 MG/DL
HCT VFR BLD AUTO: 25.8 %
HGB BLD-MCNC: 8.2 G/DL
HUMAN BOCAVIRUS: NOT DETECTED
HUMAN CORONAVIRUS, COMMON COLD VIRUS: NOT DETECTED
HYPOCHROMIA BLD QL SMEAR: ABNORMAL
INFLUENZA A - H1N1-09: NOT DETECTED
LYMPHOCYTES # BLD AUTO: 0.3 K/UL
LYMPHOCYTES NFR BLD: 6.3 %
M TB IFN-G CD4+ BCKGRND COR BLD-ACNC: -0.02 IU/ML
MCH RBC QN AUTO: 24.9 PG
MCHC RBC AUTO-ENTMCNC: 31.8 G/DL
MCV RBC AUTO: 78 FL
MITOGEN IGNF BCKGRD COR BLD-ACNC: 0.01 IU/ML
MITOGEN IGNF BCKGRD COR BLD-ACNC: ABNORMAL [IU]/ML
MONOCYTES # BLD AUTO: 0.2 K/UL
MONOCYTES NFR BLD: 3.1 %
NEUTROPHILS # BLD AUTO: 4.9 K/UL
NEUTROPHILS NFR BLD: 90.2 %
NIL: 0.05 IU/ML
OVALOCYTES BLD QL SMEAR: ABNORMAL
PARAINFLUENZA: NOT DETECTED
PLATELET # BLD AUTO: 34 K/UL
PLATELET BLD QL SMEAR: ABNORMAL
PMV BLD AUTO: ABNORMAL FL
POIKILOCYTOSIS BLD QL SMEAR: SLIGHT
POLYCHROMASIA BLD QL SMEAR: ABNORMAL
POTASSIUM SERPL-SCNC: 4.6 MMOL/L
PROT SERPL-MCNC: 5.6 G/DL
RBC # BLD AUTO: 3.29 M/UL
RVP - ADENOVIRUS: NOT DETECTED
RVP - HUMAN METAPNEUMOVIRUS (HMPV): NOT DETECTED
RVP - INFLUENZA A: NOT DETECTED
RVP - INFLUENZA B: NOT DETECTED
RVP - RESPIRATORY SYNCTIAL VIRUS (RSV) A: NOT DETECTED
RVP - RESPIRATORY VIRAL PANEL, SOURCE: NORMAL
RVP - RHINOVIRUS: NOT DETECTED
SODIUM SERPL-SCNC: 134 MMOL/L
TB2 - NIL: -0.01 IU/ML
TOXIC GRANULES BLD QL SMEAR: PRESENT
WBC # BLD AUTO: 5.43 K/UL

## 2019-02-05 PROCEDURE — 25000003 PHARM REV CODE 250: Performed by: EMERGENCY MEDICINE

## 2019-02-05 PROCEDURE — 63600175 PHARM REV CODE 636 W HCPCS: Performed by: INTERNAL MEDICINE

## 2019-02-05 PROCEDURE — 36415 COLL VENOUS BLD VENIPUNCTURE: CPT

## 2019-02-05 PROCEDURE — 27000221 HC OXYGEN, UP TO 24 HOURS

## 2019-02-05 PROCEDURE — 25000242 PHARM REV CODE 250 ALT 637 W/ HCPCS: Performed by: NURSE PRACTITIONER

## 2019-02-05 PROCEDURE — 63600175 PHARM REV CODE 636 W HCPCS: Performed by: HOSPITALIST

## 2019-02-05 PROCEDURE — 27100171 HC OXYGEN HIGH FLOW UP TO 24 HOURS

## 2019-02-05 PROCEDURE — 25000003 PHARM REV CODE 250: Performed by: PHYSICIAN ASSISTANT

## 2019-02-05 PROCEDURE — 99233 SBSQ HOSP IP/OBS HIGH 50: CPT | Mod: ,,, | Performed by: PHYSICIAN ASSISTANT

## 2019-02-05 PROCEDURE — 80053 COMPREHEN METABOLIC PANEL: CPT

## 2019-02-05 PROCEDURE — 85025 COMPLETE CBC W/AUTO DIFF WBC: CPT

## 2019-02-05 PROCEDURE — 94640 AIRWAY INHALATION TREATMENT: CPT

## 2019-02-05 PROCEDURE — 21400001 HC TELEMETRY ROOM

## 2019-02-05 PROCEDURE — 94761 N-INVAS EAR/PLS OXIMETRY MLT: CPT

## 2019-02-05 PROCEDURE — 97116 GAIT TRAINING THERAPY: CPT

## 2019-02-05 PROCEDURE — 99233 PR SUBSEQUENT HOSPITAL CARE,LEVL III: ICD-10-PCS | Mod: ,,, | Performed by: PHYSICIAN ASSISTANT

## 2019-02-05 RX ORDER — FUROSEMIDE 10 MG/ML
20 INJECTION INTRAMUSCULAR; INTRAVENOUS ONCE
Status: COMPLETED | OUTPATIENT
Start: 2019-02-05 | End: 2019-02-05

## 2019-02-05 RX ADMIN — FAMOTIDINE 20 MG: 20 TABLET ORAL at 08:02

## 2019-02-05 RX ADMIN — IPRATROPIUM BROMIDE AND ALBUTEROL SULFATE 3 ML: .5; 3 SOLUTION RESPIRATORY (INHALATION) at 08:02

## 2019-02-05 RX ADMIN — IPRATROPIUM BROMIDE AND ALBUTEROL SULFATE 3 ML: .5; 3 SOLUTION RESPIRATORY (INHALATION) at 03:02

## 2019-02-05 RX ADMIN — IPRATROPIUM BROMIDE AND ALBUTEROL SULFATE 3 ML: .5; 3 SOLUTION RESPIRATORY (INHALATION) at 12:02

## 2019-02-05 RX ADMIN — PREDNISONE 60 MG: 20 TABLET ORAL at 08:02

## 2019-02-05 RX ADMIN — SULFAMETHOXAZOLE AND TRIMETHOPRIM 2 TABLET: 800; 160 TABLET ORAL at 03:02

## 2019-02-05 RX ADMIN — FLUCONAZOLE 400 MG: 100 TABLET ORAL at 03:02

## 2019-02-05 RX ADMIN — FUROSEMIDE 20 MG: 10 INJECTION, SOLUTION INTRAMUSCULAR; INTRAVENOUS at 03:02

## 2019-02-05 RX ADMIN — SULFAMETHOXAZOLE AND TRIMETHOPRIM 2 TABLET: 800; 160 TABLET ORAL at 08:02

## 2019-02-05 RX ADMIN — IPRATROPIUM BROMIDE AND ALBUTEROL SULFATE 3 ML: .5; 3 SOLUTION RESPIRATORY (INHALATION) at 05:02

## 2019-02-05 NOTE — ASSESSMENT & PLAN NOTE
Secondary to probable PJP  TB has been ruled out  Pulmonary following and patient is status post bronch  Extubated on 2/1 and patient tolerating weaning of oxygen  Clinically improving

## 2019-02-05 NOTE — ASSESSMENT & PLAN NOTE
Likely due to sepsis and/or AIDS  Hepatitis panel negative  No abdominal pain and bilirubin not suggesting obstructive pattern or HIV cholangiopathy   U/S with fatty liver   LFTs are improving, will continue to monitor

## 2019-02-05 NOTE — ASSESSMENT & PLAN NOTE
Secondary to probable PJP  TB has been ruled out  Pulmonary following and patient is status post bronch  Extubated on 2/1 and patient tolerating weaning of oxygen  Clinically improving but still on comfort flow. Trial of furosemide today

## 2019-02-05 NOTE — PROGRESS NOTES
"[facility] Hospital Medicine  Progress Note    Patient Name: Lior Prado  MRN: [id number]  Patient Class: IP- Inpatient   Admission Date: 1/28/2019  Length of Stay: 8 days  Attending Physician: Mckenzie Rodriguez MD  Primary Care Provider: Primary Doctor No        Subjective:     Principal Problem:AIDS    HPI:  Mr. Donnelly is a 44 yo M with no reported past medical history- presents with a CC of weakness and some SOB for one week. He notes that he was treated with abx for a "pneumonia" 1/13-1/23.  He presents to the ED and is found to have thrush. Further, his HIV is +. The patient was started on treatment for PJP pneumonia and ID was consulted. The patient is thin and ill appearing.      Hospital Course:  Mr. Donnelly presented with shortness of breath and was admitted for treatment of PJP pneumonia and ID was consulted. The patient had a CTA of his chest that was negative for PE but was remarkable for a 1.8 cm irregular, cavitary opacity within the anterior segment of the right upper lobe, innumerable randomly distributed bilateral pulmonary nodules, patchy areas of subsegmental consolidation and nodular thickening of the right major and minor fissures, and mediastinal adenopathy.  Patient was started on empiric IV Bactrim and steroids.  He was transferred to the ICU due to high O2 requirements, rising lactate, and marginal blood pressures.  Pancytopenia was also present and worsened.  He was transfused 1 unit of platelets.  His blood pressures responded to IV fluid to alone and he did not require vasopressors. Pulmonology consulted for possible bronch in new diagnosis of HIV, and concern for PJP.  ID was also consulted.  On 1/30, CD4 count resulted at 11.  ID recommended bactrim and rocephin for probable PJP and CAP, respectively. Diflucan for oral candida. Pancytopenia likely due to sepsis, possible opportunistic infections and AIDS. Transaminases and lactate/LDH rising. On 1/31, patient " underwent bronchoscopy and remained on the ventilator postprocedure.  He was later extubated on 2/01 with use of BiPAP p.r.n. Rapid AFB stain resulted negative and was taken off isolation on 2/2.  His oxygen requirements continued to decline and liver function tests continue to improve.  He was stable for transfer to floor on 2/3.    Interval History:  Patient without new complaints, shortness of breath and cough slowly improving.  O2 requirement declining.    Review of Systems   Respiratory: Positive for cough and shortness of breath.    Neurological: Positive for weakness.   Psychiatric/Behavioral: The patient is nervous/anxious.      Objective:     Vital Signs (Most Recent):  Temp: 98.3 °F (36.8 °C) (02/04/19 2350)  Pulse: 97 (02/04/19 2350)  Resp: 17 (02/04/19 2350)  BP: (!) 106/59 (02/04/19 2350)  SpO2: (!) 94 % (02/04/19 2350) Vital Signs (24h Range):  Temp:  [97.3 °F (36.3 °C)-98.6 °F (37 °C)] 98.3 °F (36.8 °C)  Pulse:  [] 97  Resp:  [16-20] 17  SpO2:  [91 %-97 %] 94 %  BP: (102-115)/(59-68) 106/59     Weight: 56.9 kg (125 lb 7.1 oz)  Body mass index is 20.25 kg/m².    Intake/Output Summary (Last 24 hours) at 2/4/2019 2354  Last data filed at 2/4/2019 2100  Gross per 24 hour   Intake 1220 ml   Output 2980 ml   Net -1760 ml      Physical Exam   Constitutional: He is oriented to person, place, and time. No distress.   Cachectic, Welsh-speaking only   HENT:   Head: Normocephalic.   + temporal wasting   Eyes: EOM are normal. Pupils are equal, round, and reactive to light. No scleral icterus.   Cardiovascular: Normal rate, regular rhythm and normal heart sounds.   No murmur heard.  Pulmonary/Chest: He has no wheezes. He has no rhonchi.   Coarse breath sounds with fair air movement   Abdominal: Soft. Bowel sounds are normal. There is no hepatosplenomegaly. There is no tenderness. There is no guarding.   Musculoskeletal: Normal range of motion. He exhibits no edema.   Neurological: He is alert and oriented  to person, place, and time.   Skin: No rash noted. He is not diaphoretic. No erythema.       Significant Labs:   Blood Culture: No results for input(s): LABBLOO in the last 48 hours.  BMP:   Recent Labs   Lab 02/04/19  0717   *   *   K 3.9      CO2 24   BUN 15   CREATININE 0.7   CALCIUM 7.8*     CBC:   Recent Labs   Lab 02/03/19  0458 02/04/19  0717   WBC 4.42 5.06   HGB 8.3* 7.9*   HCT 25.7* 24.4*   PLT 32* 34*       Significant Imaging: I have reviewed and interpreted all pertinent imaging results/findings within the past 24 hours.    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 today  Will DC IV steroids and begin prednisone 60 mg p.o. daily starting tomorrow and will plan on slow taper  Fluconazole transitioned to p.o. today  Wean oxygen as tolerated  Continue nebs and have BiPAP p.r.n.  Appreciate Pulmonary and ID input  Will follow up on remaining studies and will monitor over the next few days for progress  Will continue monitor with labs     Acute hypoxemic respiratory failure    Secondary to probable PJP  TB has been ruled out  Pulmonary following and patient is status post bronch  Extubated on 2/1 and patient tolerating weaning of oxygen  Clinically improving     Pancytopenia     Secondary to AIDS and/or medication induced  Thrombocytopenia 12k s/p transfusion of 1 unit platelets prior to bronch  Appreciate Heme-Onc input  Platelets overall stable  Will continue to monitor     Elevated LFTs    Likely due to sepsis and/or AIDS  Hepatitis panel negative  No abdominal pain and bilirubin not suggesting obstructive pattern or HIV cholangiopathy   U/S with fatty liver   LFTs are improving, will continue to monitor     Malnutrition of moderate degree    Pt did not comment on changes in diet  Due to AIDS  Will supplement nutrition with boost glucose control     Tobacco abuse    Smoking cessation education with > 3 min of counseling  Nicotine patch offered      Hyponatremia    Improved with IVF  Will continue monitor.     Weakness    PT/OT      Thrush    Change fluconazole to p.o., 400 mg p.o. daily       VTE Risk Mitigation (From admission, onward)        Ordered     IP VTE LOW RISK PATIENT  Once      01/28/19 1927     Place DENY hose  Until discontinued      01/28/19 1927              Mckenzie Rodriguez MD  Department of Hospital Medicine   [facility]

## 2019-02-05 NOTE — ASSESSMENT & PLAN NOTE
"44 y/o Burmese speaking male with no significant medical history presents with progressively worsening SOB, cough, weakness and weight loss. Denied hemoptysis or sputum production. He was found to be positive for HIV with CD4 count of 11. ID work up initiated:    Blood cultures 1/28 NGTD  CXR with concerns for PCP pneumonia; CTA shows 1.8 cm irregular, cavitary opacity within the anterior segment of the right upper lobe  Pt now S/p bronchoscopy; AFB cultures NGTD, cytology not sent??  Fungitell positive  TB PCR NGTD; quant gold indeterminant; repeated yesterday  Crypto negative  c diff negative; stool culture negative  RPR/toxo negative  Aspergillus antigen positive    Plan:  1. Continue bactrim  DS 2 tablets PO TID; monitor thrombocytopenia (improved from a few days ago); can use alternate therapy if platelets decline; G6PD testing pending  2. Hold off on MAC prophylaxis for now but will need to start as outpatient given CD4 of 11  3. F/u on remaining studies  4. Continue fluconazole 400 mg PO daily for now  5. Continue prednisone  6. Will repeat aspergillus antigen with morning labs (as pt was on zosyn when drawn)  7. Will hold off on starting antiviral therapy for now as starting medication can cause risk of IRIS and pt not currently wanting to start HIV meds; pt will need f/u as outpatient; per Dr. Escalera "Dr Shine at [facility] is Burmese speaking and she may be a good provider for this patient to see outpatient"    "

## 2019-02-05 NOTE — ASSESSMENT & PLAN NOTE
Secondary to AIDS and/or medication induced  Thrombocytopenia 12k s/p transfusion of 1 unit platelets prior to bronch  Appreciate Heme-Onc input  Platelets overall stable  Will continue to monitor

## 2019-02-05 NOTE — ASSESSMENT & PLAN NOTE
Likely PJP PNA  --Wean HFNC as tolerates  --Abx/steroids per ID, would switch to prednisone and PO Bactrim    Please call with questions

## 2019-02-05 NOTE — SUBJECTIVE & OBJECTIVE
Interval History: pt tolerating weaning of oxygen. Sitting up in bed. Pt not wanting to take HIV meds as patient read they are toxic. No other concerns currently.     Review of Systems   Constitutional: Negative for chills and fever.   Respiratory: Positive for cough and shortness of breath.    Gastrointestinal: Negative for diarrhea, nausea and vomiting.   Skin: Negative for wound.   Neurological: Positive for weakness.     Objective:     Vital Signs (Most Recent):  Temp: 98.1 °F (36.7 °C) (02/05/19 1156)  Pulse: 90 (02/05/19 1156)  Resp: 16 (02/05/19 1156)  BP: 110/63 (02/05/19 1156)  SpO2: (!) 93 % (02/05/19 1156) Vital Signs (24h Range):  Temp:  [97.5 °F (36.4 °C)-98.5 °F (36.9 °C)] 98.1 °F (36.7 °C)  Pulse:  [] 90  Resp:  [16-20] 16  SpO2:  [91 %-96 %] 93 %  BP: ()/(59-67) 110/63     Weight: 56.9 kg (125 lb 7.1 oz)  Body mass index is 20.25 kg/m².    Estimated Creatinine Clearance: 127.8 mL/min (based on SCr of 0.6 mg/dL).    Physical Exam   Constitutional: He appears cachectic. No distress.   Thin, ill appearing   HENT:   Head: Normocephalic.   Cardiovascular: Regular rhythm and normal heart sounds. Exam reveals no friction rub.   No murmur heard.  Pulmonary/Chest: No stridor. No respiratory distress. He has no wheezes.   oxygen   Abdominal: Soft. He exhibits no distension. There is no tenderness. There is no guarding.   Musculoskeletal: He exhibits no tenderness.   Skin: Skin is warm. He is not diaphoretic. No erythema. No pallor.   Psychiatric: He has a normal mood and affect.   Vitals reviewed.      Significant Labs:   Blood Culture:   Recent Labs   Lab 01/28/19  1023 01/28/19  1028   LABBLOO No growth after 5 days. No growth after 5 days.     CBC:   Recent Labs   Lab 02/04/19  0717 02/05/19  0442   WBC 5.06 5.43   HGB 7.9* 8.2*   HCT 24.4* 25.8*   PLT 34* 34*     CMP:   Recent Labs   Lab 02/04/19  0717 02/05/19  0442   * 134*   K 3.9 4.6    102   CO2 24 25   * 126*   BUN 15  14   CREATININE 0.7 0.6   CALCIUM 7.8* 8.3*   PROT 5.3* 5.6*   ALBUMIN 1.8* 2.0*   BILITOT 0.3 0.3   ALKPHOS 232* 254*   AST 28 25   ALT 91* 86*   ANIONGAP 9 7*   EGFRNONAA >60 >60     Procalcitonin: No results for input(s): PROCAL in the last 48 hours.  Quantiferon: No results for input(s): NIL, TBAG, TBAGNIL, MITOGENNIL, TBGOLD in the last 48 hours.  Respiratory Culture:   Recent Labs   Lab 01/31/19  1035   GSRESP <10 epithelial cells per low power field.  Many WBC's  Many budding yeast   RESPIRATORYC No growth     Urine Culture: No results for input(s): LABURIN in the last 4320 hours.  Urine Studies:   Recent Labs   Lab 01/28/19  0734   COLORU Yellow   APPEARANCEUA Clear   PHUR 6.0   SPECGRAV 1.015   PROTEINUA Negative   GLUCUA Negative   KETONESU Negative   BILIRUBINUA Negative   OCCULTUA Negative   NITRITE Negative   UROBILINOGEN 2.0-3.0*   LEUKOCYTESUR Negative   SQUAMEPITHEL 1     Wound Culture: No results for input(s): LABAERO in the last 4320 hours.    Significant Imaging: I have reviewed all pertinent imaging results/findings within the past 24 hours.

## 2019-02-05 NOTE — PLAN OF CARE
Problem: Physical Therapy Goal  Goal: Physical Therapy Goal  Goals to be met by: 19     Patient will increase functional independence with mobility by performin. Supine to sit with [place]  2. Rolling to Left and Right with [place]  3. Sit to stand transfer with [place]  4. Bed to chair transfer with [place]   5. Gait  >250 feet with [place]   6. Upper/Lower extremity exercise program 3 sets x10 reps per handout, with independence     Outcome: Ongoing (interventions implemented as appropriate)  Pt ambulated ~500 ft with R hand held assistance and 6L O2 NC, HR ~120's bpm.

## 2019-02-05 NOTE — PROGRESS NOTES
"[facility] Medicine  Progress Note    Patient Name: Lior Prado  MRN: [id number]  Patient Class: IP- Inpatient   Admission Date: 1/28/2019  Length of Stay: 8 days  Attending Physician: Steph Guillen MD  Primary Care Provider: Primary Doctor No        Subjective:     Principal Problem:AIDS    HPI:  Mr. Donnelly is a 44 yo M with no reported past medical history- presents with a CC of weakness and some SOB for one week. He notes that he was treated with abx for a "pneumonia" 1/13-1/23.  He presents to the ED and is found to have thrush. Further, his HIV is +. The patient was started on treatment for PJP pneumonia and ID was consulted. The patient is thin and ill appearing.      Hospital Course:  Mr. Donnelly presented with shortness of breath and was admitted for treatment of PJP pneumonia and ID was consulted. The patient had a CTA of his chest that was negative for PE but was remarkable for a 1.8 cm irregular, cavitary opacity within the anterior segment of the right upper lobe, innumerable randomly distributed bilateral pulmonary nodules, patchy areas of subsegmental consolidation and nodular thickening of the right major and minor fissures, and mediastinal adenopathy.  Patient was started on empiric IV Bactrim and steroids.  He was transferred to the ICU due to high O2 requirements, rising lactate, and marginal blood pressures.  Pancytopenia was also present and worsened.  He was transfused 1 unit of platelets.  His blood pressures responded to IV fluid to alone and he did not require vasopressors. Pulmonology consulted for possible bronch in new diagnosis of HIV, and concern for PJP.  ID was also consulted.  On 1/30, CD4 count resulted at 11.  ID recommended bactrim and rocephin for probable PJP and CAP, respectively. Diflucan for oral candida. Pancytopenia likely due to sepsis, possible opportunistic infections and AIDS. Transaminases and lactate/LDH rising. On 1/31, " patient underwent bronchoscopy and remained on the ventilator postprocedure.  He was later extubated on 2/01 with use of BiPAP p.r.n. Rapid AFB stain resulted negative and was taken off isolation on 2/2.  His oxygen requirements continued to decline and liver function tests continue to improve.  He was stable for transfer to floor on 2/3.    Interval History: patient states feeling better. Still on comfort flow (10L, 30%). Platelets low but stable. Does not report melena, blood on stool or hematuria    Review of Systems   Respiratory: Positive for shortness of breath (with exertion).    Cardiovascular: Negative.    Gastrointestinal: Negative.      Objective:     Vital Signs (Most Recent):  Temp: 98.1 °F (36.7 °C) (02/05/19 1156)  Pulse: 107 (02/05/19 1255)  Resp: 18 (02/05/19 1255)  BP: 110/63 (02/05/19 1156)  SpO2: 98 % (02/05/19 1255) Vital Signs (24h Range):  Temp:  [97.5 °F (36.4 °C)-98.5 °F (36.9 °C)] 98.1 °F (36.7 °C)  Pulse:  [] 107  Resp:  [16-20] 18  SpO2:  [91 %-98 %] 98 %  BP: ()/(59-67) 110/63     Weight: 56.9 kg (125 lb 7.1 oz)  Body mass index is 20.25 kg/m².    Intake/Output Summary (Last 24 hours) at 2/5/2019 1341  Last data filed at 2/5/2019 0600  Gross per 24 hour   Intake 300 ml   Output 1550 ml   Net -1250 ml      Physical Exam   Constitutional: He is oriented to person, place, and time. No distress.   Cachectic, Setswana-speaking only   HENT:   Head: Normocephalic.   + temporal wasting   Eyes: EOM are normal. Pupils are equal, round, and reactive to light. No scleral icterus.   Cardiovascular: Normal rate, regular rhythm and normal heart sounds.   No murmur heard.  Pulmonary/Chest: Effort normal. No respiratory distress. He has no wheezes. He has no rhonchi. He exhibits no tenderness.   Rales throughout   Abdominal: Soft. Bowel sounds are normal. There is no hepatosplenomegaly. There is no tenderness. There is no guarding.   Musculoskeletal: Normal range of motion. He exhibits no  edema.   Neurological: He is alert and oriented to person, place, and time.   Skin: No rash noted. He is not diaphoretic. No erythema.       Significant Labs: All pertinent labs within the past 24 hours have been reviewed.    Significant Imaging: I have reviewed all pertinent imaging results/findings within the past 24 hours.  I have reviewed and interpreted all pertinent imaging results/findings within the past 24 hours.    Assessment/Plan:      * AIDS    Pt was unaware of virus and does not want information to be shared with anyone  He reported he has a wife in [place] who is healthy   Pt reportedly denied intercourse x 7 years, no IV drug use, no incarceration, no blood transfusions, professional tattoos with clean needles to his knowledge, no visits to [place] in 7 years, no other sick contacts   CD4 count is 11, and with opportunistic infection consistent with AIDS  Patient currently on treatment for PJP pneumonia with bactrim and steroids (needs taper). Plan to initiate antiretroviral therapy as outpatient once further testing done. However, patient states he does not want to initiate antiretroviral therapy because it is toxic. I spent > 5 min discussing the dangers of not treating HIV/AIDS (in German). Verbalized understanding but still wants to antiretroviral therapy.   Patient is undocumented, and resources will be difficult.    consulted for community resources     Acute hypoxemic respiratory failure    Secondary to probable PJP  TB has been ruled out  Pulmonary following and patient is status post bronch  Extubated on 2/1 and patient tolerating weaning of oxygen  Clinically improving but still on comfort flow. Trial of furosemide today     Pneumonia, unspecified organism    Presumed PJP insetting of AIDS  Rocephin discontinued by ID  Bactrim now transitioned to PO. On prednisone. Will need taper  Weaning oxygen as tolerated             Pancytopenia    Secondary to AIDS and/or medication  induced  Thrombocytopenia 12k s/p transfusion of 1 unit platelets prior to bronch  Appreciate Heme-Onc input  Platelets overall stable  Will continue to monitor     Elevated LFTs    Likely due to sepsis and/or AIDS  Hepatitis panel negative  No abdominal pain and bilirubin not suggesting obstructive pattern or HIV cholangiopathy   U/S with fatty liver   LFTs are improving, will continue to monitor         Malnutrition of moderate degree    Pt did not comment on changes in diet  Due to AIDS  Will supplement nutrition with boost glucose control     Tobacco abuse    Smoking cessation education with > 3 min of counseling  Nicotine patch offered        Hyponatremia    Improved with IVF  Will continue monitor.     Weakness    PT/OT        Thrush    Change fluconazole to p.o., 400 mg p.o. daily       VTE Risk Mitigation (From admission, onward)        Ordered     IP VTE LOW RISK PATIENT  Once      01/28/19 1927     Place DENY hose  Until discontinued      01/28/19 1927              Steph Edmondson MD  Department of Hospital Medicine   [facility]

## 2019-02-05 NOTE — ASSESSMENT & PLAN NOTE
Presumed PJP insetting of AIDS  Rocephin discontinued by ID  Bactrim now transitioned to PO. On prednisone. Will need taper  Weaning oxygen as tolerated

## 2019-02-05 NOTE — ASSESSMENT & PLAN NOTE
Pt did not comment on changes in diet  Due to AIDS  Will supplement nutrition with boost glucose control

## 2019-02-05 NOTE — PT/OT/SLP PROGRESS
Occupational Therapy      Patient Name:  Lior Prado   MRN:  [id number]    Orders received, chart reviewed.  Patient independent with self care per Dr. Guillen. Cancel OT orders.     SOFIA Magallanes, MS  2/5/2019

## 2019-02-05 NOTE — ASSESSMENT & PLAN NOTE
Pt was unaware of virus and does not want information to be shared with anyone  He reported he has a wife in [place] who is healthy   Pt reportedly denied intercourse x 7 years, no IV drug use, no incarceration, no blood transfusions, professional tattoos with clean needles to his knowledge, no visits to [place] in 7 years, no other sick contacts   CD4 count is 11, and with opportunistic infection consistent with AIDS  Patient currently on treatment for PJP pneumonia with bactrim and steroids (needs taper). Plan to initiate antiretroviral therapy as outpatient once further testing done. However, patient states he does not want to initiate antiretroviral therapy because it is toxic. I spent > 5 min discussing the dangers of not treating HIV/AIDS (in Greenlandic). Verbalized understanding but still wants to antiretroviral therapy.   Patient is undocumented, and resources will be difficult.    consulted for community resources

## 2019-02-05 NOTE — SUBJECTIVE & OBJECTIVE
Interval History:  Patient without new complaints, shortness of breath and cough slowly improving.  O2 requirement declining.    Review of Systems   Respiratory: Positive for cough and shortness of breath.    Neurological: Positive for weakness.   Psychiatric/Behavioral: The patient is nervous/anxious.      Objective:     Vital Signs (Most Recent):  Temp: 98.3 °F (36.8 °C) (02/04/19 2350)  Pulse: 97 (02/04/19 2350)  Resp: 17 (02/04/19 2350)  BP: (!) 106/59 (02/04/19 2350)  SpO2: (!) 94 % (02/04/19 2350) Vital Signs (24h Range):  Temp:  [97.3 °F (36.3 °C)-98.6 °F (37 °C)] 98.3 °F (36.8 °C)  Pulse:  [] 97  Resp:  [16-20] 17  SpO2:  [91 %-97 %] 94 %  BP: (102-115)/(59-68) 106/59     Weight: 56.9 kg (125 lb 7.1 oz)  Body mass index is 20.25 kg/m².    Intake/Output Summary (Last 24 hours) at 2/4/2019 2354  Last data filed at 2/4/2019 2100  Gross per 24 hour   Intake 1220 ml   Output 2980 ml   Net -1760 ml      Physical Exam   Constitutional: He is oriented to person, place, and time. No distress.   Cachectic, Japanese-speaking only   HENT:   Head: Normocephalic.   + temporal wasting   Eyes: EOM are normal. Pupils are equal, round, and reactive to light. No scleral icterus.   Cardiovascular: Normal rate, regular rhythm and normal heart sounds.   No murmur heard.  Pulmonary/Chest: He has no wheezes. He has no rhonchi.   Coarse breath sounds with fair air movement   Abdominal: Soft. Bowel sounds are normal. There is no hepatosplenomegaly. There is no tenderness. There is no guarding.   Musculoskeletal: Normal range of motion. He exhibits no edema.   Neurological: He is alert and oriented to person, place, and time.   Skin: No rash noted. He is not diaphoretic. No erythema.       Significant Labs:   Blood Culture: No results for input(s): LABBLOO in the last 48 hours.  BMP:   Recent Labs   Lab 02/04/19  0717   *   *   K 3.9      CO2 24   BUN 15   CREATININE 0.7   CALCIUM 7.8*     CBC:   Recent Labs    Lab 02/03/19  0458 02/04/19  0717   WBC 4.42 5.06   HGB 8.3* 7.9*   HCT 25.7* 24.4*   PLT 32* 34*       Significant Imaging: I have reviewed and interpreted all pertinent imaging results/findings within the past 24 hours.

## 2019-02-05 NOTE — NURSING
Report given to SANDOVAL Newton. Patient resting comfortably, no complaints, no acute distress noted. 12 hour chart check completed.

## 2019-02-05 NOTE — PLAN OF CARE
02/05/19 1539   Discharge Reassessment   Assessment Type Discharge Planning Reassessment   Do you have any problems affording any of your prescribed medications? TBD   Discharge Plan A Home   Discharge Plan B Home   DME Needed Upon Discharge  none   Patient choice form signed by patient/caregiver N/A   Anticipated Discharge Disposition Home   Can the patient answer the patient profile reliably? Yes, cognitively intact   How does the patient rate their overall health at the present time? Poor   Describe the patient's ability to walk at the present time. Minor restrictions or changes   How often would a person be available to care for the patient? Infrequently   Number of comorbid conditions (as recorded on the chart) Five or more   During the past month, has the patient often been bothered by feeling down, depressed or hopeless? Yes   During the past month, has the patient often been bothered by little interest or pleasure in doing things? Yes   Post-Acute Status   Post-Acute Authorization (home)

## 2019-02-05 NOTE — PHYSICIAN QUERY
PT Name: Lior Prado  MR #: [id number]    Physician Query Form - Respiratory Condition Clarification      CDS/: Lyn Hale               Contact information: [email or web address]    This form is a permanent document in the medical record.    Query Date: February 5, 2019    By submitting this query, we are merely seeking further clarification of documentation. Please utilize your independent clinical judgment when addressing the question(s) below.    The Medical record contains the following   Indicators   Supporting Clinical Findings Location in Medical Record   X   SOB, OSUNA, Wheezing, Productive Cough, Use of Accessory Muscles, etc. SOB for one week     H&P   X   Acute/Chronic Illness Acute hypoxemic respiratory failure     Likely PJP PNA  --Wean HFNC as tolerates  --Abx/steroids per ID, would switch to prednisone and PO Bactrim        PN 1/4      Radiology Findings     X   Respiratory Distress or Failure admitted with hypoxic respiratory distress PN 1/28   X   Hypoxia or Hypercapnia Pt in ICU for 02 requirements secondary to hypoxia     PN 2/2   X   RR         ABGs         O2 sat RR= 21- 33  O2 sats 86% - 93% VS flow sheet 1/29        BiPAP/Intubation     X   Supplemental O2 Nonrebreather VS flow sheet 1/28      Home O2, Oxygen Dependence        Treatment        Other       Respiratory failure can be acute, chronic or both. It is generally further specified as hypoxic, hypercapnic or both. Lastly, it is important to identify an etiology, if known or suspected.   References:: https://www.acphospitalist.org/archives/2013/10/coding; htm; http://[email or web address].com/acute-respiratory-failure-know    The clinical guidelines noted below are only system guidelines, and do not replace the providers clinical judgment.    Provider, please specify diagnosis or diagnoses associated with above clinical findings.   [   ] Acute Respiratory Failure with Hypoxia - ABG pO2 < 60 mmHg or O2 sat of 88% on RA and respiratory  symptoms documented   [   ] Acute Respiratory Distress - Generally describes less severe respiratory symptoms (tachypnea, in respiratory distress, increased work of breathing, unable to speak in complete sentences, labored breathing, use of accessory muscles, RR> 24, cyanosis, dyspnea, wheezing, stridor, lethargy) without sufficient measurements (pO2, SpO2, pH, and pCO2) to meet criteria for respiratory failure    [   ] Hypoxia Only   [   ] Other Respiratory Diagnosis (please specify): _please defer to attending physician Dr.Cruz Edmondson ________________________________   [   ]  Clinically Undetermined       Please document in your progress notes daily for the duration of treatment until resolved and include in your discharge summary.

## 2019-02-05 NOTE — ASSESSMENT & PLAN NOTE
Pt was unaware of virus and does not want information to be shared with anyone  He reported he has a wife in [place] who is healthy   Pt reportedly denied intercourse x 7 years, no IV drug use, no incarceration, no blood transfusions, professional tattoos with clean needles to his knowledge, no visits to [place] in 7 years, no other sick contacts   CD4 count is 11, and with opportunistic infection consistent with AIDS  Patient currently on treatment for PJP pneumonia  As per ID recommendations  Patient is undocumented, and resources will be difficult   consulted for community resources

## 2019-02-05 NOTE — SUBJECTIVE & OBJECTIVE
Interval History: patient states feeling better. Still on comfort flow (10L, 30%). Platelets low but stable. Does not report melena, blood on stool or hematuria    Review of Systems   Respiratory: Positive for shortness of breath (with exertion).    Cardiovascular: Negative.    Gastrointestinal: Negative.      Objective:     Vital Signs (Most Recent):  Temp: 98.1 °F (36.7 °C) (02/05/19 1156)  Pulse: 107 (02/05/19 1255)  Resp: 18 (02/05/19 1255)  BP: 110/63 (02/05/19 1156)  SpO2: 98 % (02/05/19 1255) Vital Signs (24h Range):  Temp:  [97.5 °F (36.4 °C)-98.5 °F (36.9 °C)] 98.1 °F (36.7 °C)  Pulse:  [] 107  Resp:  [16-20] 18  SpO2:  [91 %-98 %] 98 %  BP: ()/(59-67) 110/63     Weight: 56.9 kg (125 lb 7.1 oz)  Body mass index is 20.25 kg/m².    Intake/Output Summary (Last 24 hours) at 2/5/2019 1341  Last data filed at 2/5/2019 0600  Gross per 24 hour   Intake 300 ml   Output 1550 ml   Net -1250 ml      Physical Exam   Constitutional: He is oriented to person, place, and time. No distress.   Cachectic, Ukrainian-speaking only   HENT:   Head: Normocephalic.   + temporal wasting   Eyes: EOM are normal. Pupils are equal, round, and reactive to light. No scleral icterus.   Cardiovascular: Normal rate, regular rhythm and normal heart sounds.   No murmur heard.  Pulmonary/Chest: Effort normal. No respiratory distress. He has no wheezes. He has no rhonchi. He exhibits no tenderness.   Rales throughout   Abdominal: Soft. Bowel sounds are normal. There is no hepatosplenomegaly. There is no tenderness. There is no guarding.   Musculoskeletal: Normal range of motion. He exhibits no edema.   Neurological: He is alert and oriented to person, place, and time.   Skin: No rash noted. He is not diaphoretic. No erythema.       Significant Labs: All pertinent labs within the past 24 hours have been reviewed.    Significant Imaging: I have reviewed all pertinent imaging results/findings within the past 24 hours.  I have reviewed and  interpreted all pertinent imaging results/findings within the past 24 hours.

## 2019-02-05 NOTE — PT/OT/SLP PROGRESS
Physical Therapy Treatment    Patient Name:  Lior Prado   MRN:  [id number]    Recommendations:     Discharge Recommendations:  (outpatient PT services)   Discharge Equipment Recommendations: none   Barriers to discharge: Pt on 6L O2 NC at this time.    Assessment:     Lior Prado is a 43 y.o. male admitted with a medical diagnosis of AIDS.  He presents with the following impairments/functional limitations:  weakness, impaired cardiopulmonary response to activity, impaired endurance.    Rehab Prognosis: Good; patient would benefit from acute skilled PT services to address these deficits and reach maximum level of function.    Recent Surgery: Procedure(s) (LRB):  Bronchoscopy (N/A) 5 Days Post-Op    Plan:     During this hospitalization, patient to be seen 3 x/week(M-F) to address the identified rehab impairments via gait training, therapeutic activities, therapeutic exercises and progress toward the following goals:    · Plan of Care Expires:  02/18/19    Subjective     Chief Complaint: N/A  Patient/Family Comments/goals: Pt did not state.   Pain/Comfort:  · Pain Rating 1: 0/10      Objective:     Communicated with nurse Baez prior to session.  Patient found in bed with all lines intact and call button in reach oxygen 6L, peripheral IV, telemetry upon PT entry to room.     General Precautions: Standard, fall, respiratory   Orthopedic Precautions:N/A   Braces: N/A    Functional Mobility:  · Bed Mobility:     · Scooting: modified independence  · Supine to Sit: modified independence with HOB elevated   · Transfers:     · Sit to Stand:  stand by assistance with no AD  · Gait: Pt ambulated ~500 ft with R HHA and 6L O2 NC, HR ~120's bpm.  Pt with decreased step length and bebe.    · Balance: Pt with fair+ balance.       AM-PAC 6 CLICK MOBILITY  Turning over in bed (including adjusting bedclothes, sheets and blankets)?: 4  Sitting down on and standing up from a chair with arms (e.g., wheelchair,  bedside commode, etc.): 4  Moving from lying on back to sitting on the side of the bed?: 4  Moving to and from a bed to a chair (including a wheelchair)?: 4  Need to walk in hospital room?: 3  Climbing 3-5 steps with a railing?: 3  Basic Mobility Total Score: 22       Therapeutic Activities and Exercises:  Standing BUE therex 10 reps: shoulder flex, butterfly, and arm circles    Standing BLE therex 10 reps: marches, calf raises, and mini squats    Patient left seated EOB with all lines intact, call button in reach and nurse Sasha present.    GOALS:   Multidisciplinary Problems     Physical Therapy Goals        Problem: Physical Therapy Goal    Goal Priority Disciplines Outcome Goal Variances Interventions   Physical Therapy Goal     PT, PT/OT      Description:  Goals to be met by: 19     Patient will increase functional independence with mobility by performin. Supine to sit with [place]  2. Rolling to Left and Right with [place]  3. Sit to stand transfer with [place]  4. Bed to chair transfer with [place]   5. Gait  >250 feet with [place]   6. Upper/Lower extremity exercise program 3 sets x10 reps per handout, with independence                      Time Tracking:     PT Received On: 19  PT Start Time: 1502     PT Stop Time: 1515  PT Total Time (min): 13 min     Billable Minutes: Gait Training 13 min                   Lakisha Mcgregor, PT  2019

## 2019-02-05 NOTE — PROGRESS NOTES
"[facility] - [place], [place]  Infectious Disease  Progress Note    Patient Name: Lior Prado  MRN: [id number]  Admission Date: 1/28/2019  Length of Stay: 8 days  Attending Physician: Steph Guillen MD  Primary Care Provider: Primary Doctor No    Isolation Status: No active isolations  Assessment/Plan:      * AIDS    44 y/o Ukrainian speaking male with no significant medical history presents with progressively worsening SOB, cough, weakness and weight loss. Denied hemoptysis or sputum production. He was found to be positive for HIV with CD4 count of 11. ID work up initiated:    Blood cultures 1/28 NGTD  CXR with concerns for PCP pneumonia; CTA shows 1.8 cm irregular, cavitary opacity within the anterior segment of the right upper lobe  Pt now S/p bronchoscopy; AFB cultures NGTD, cytology not sent??  Fungitell positive  TB PCR NGTD; quant gold indeterminant; repeated yesterday  Crypto negative  c diff negative; stool culture negative  RPR/toxo negative  Aspergillus antigen positive    Plan:  1. Continue bactrim  DS 2 tablets PO TID; monitor thrombocytopenia (improved from a few days ago); can use alternate therapy if platelets decline; G6PD testing pending  2. Hold off on MAC prophylaxis for now but will need to start as outpatient given CD4 of 11  3. F/u on remaining studies  4. Continue fluconazole 400 mg PO daily for now  5. Continue prednisone  6. Will repeat aspergillus antigen with morning labs (as pt was on zosyn when drawn)  7. Will hold off on starting antiviral therapy for now as starting medication can cause risk of IRIS and pt not currently wanting to start HIV meds; pt will need f/u as outpatient; per Dr. Escalera "Dr Shine at [facility] is Ukrainian speaking and she may be a good provider for this patient to see outpatient"         Discussed with ID staff, Dr. Poon  Thank you for your consult. I will follow-up with patient. Please contact us if you have any additional questions.  Estrella HARDING" ANN Berg Pager: [phone]    Infectious Disease  [facility]    Subjective:     Principal Problem:AIDS    HPI: 42 y/o Frisian speaking male with no significant medical history presents with progressively worsening SOB and weakness for the past week. Associated symptoms include dizziness. He reports having a cough (mostly at nights) for the past week. Denies hemoptysis or sputum production. He denies having any fever chills or night sweats at home. No n/v/d,dysuria. He reports having dyspnea on exertion and 6kg weight loss. On admission he was found to be febrile and significantly diaphoretic. His HIV rapid test is positive. Concerns for PJP pneumonia. He is thin and ill appearing.    He works as a . He denies incarceration or homelessness. He denies knowing about HIV diagnosis. He smokes, denies IVDU or illicit drug use. He denies exposure to TB. No significant allergies at this time.     Blood cultures NGTD  He is febrile 101 and tachycardic  Interval History: pt tolerating weaning of oxygen. Sitting up in bed. Pt not wanting to take HIV meds as patient read they are toxic. No other concerns currently.     Review of Systems   Constitutional: Negative for chills and fever.   Respiratory: Positive for cough and shortness of breath.    Gastrointestinal: Negative for diarrhea, nausea and vomiting.   Skin: Negative for wound.   Neurological: Positive for weakness.     Objective:     Vital Signs (Most Recent):  Temp: 98.1 °F (36.7 °C) (02/05/19 1156)  Pulse: 90 (02/05/19 1156)  Resp: 16 (02/05/19 1156)  BP: 110/63 (02/05/19 1156)  SpO2: (!) 93 % (02/05/19 1156) Vital Signs (24h Range):  Temp:  [97.5 °F (36.4 °C)-98.5 °F (36.9 °C)] 98.1 °F (36.7 °C)  Pulse:  [] 90  Resp:  [16-20] 16  SpO2:  [91 %-96 %] 93 %  BP: ()/(59-67) 110/63     Weight: 56.9 kg (125 lb 7.1 oz)  Body mass index is 20.25 kg/m².    Estimated Creatinine Clearance: 127.8 mL/min (based on SCr of 0.6  mg/dL).    Physical Exam   Constitutional: He appears cachectic. No distress.   Thin, ill appearing   HENT:   Head: Normocephalic.   Cardiovascular: Regular rhythm and normal heart sounds. Exam reveals no friction rub.   No murmur heard.  Pulmonary/Chest: No stridor. No respiratory distress. He has no wheezes.   oxygen   Abdominal: Soft. He exhibits no distension. There is no tenderness. There is no guarding.   Musculoskeletal: He exhibits no tenderness.   Skin: Skin is warm. He is not diaphoretic. No erythema. No pallor.   Psychiatric: He has a normal mood and affect.   Vitals reviewed.      Significant Labs:   Blood Culture:   Recent Labs   Lab 01/28/19  1023 01/28/19  1028   LABBLOO No growth after 5 days. No growth after 5 days.     CBC:   Recent Labs   Lab 02/04/19  0717 02/05/19  0442   WBC 5.06 5.43   HGB 7.9* 8.2*   HCT 24.4* 25.8*   PLT 34* 34*     CMP:   Recent Labs   Lab 02/04/19  0717 02/05/19  0442   * 134*   K 3.9 4.6    102   CO2 24 25   * 126*   BUN 15 14   CREATININE 0.7 0.6   CALCIUM 7.8* 8.3*   PROT 5.3* 5.6*   ALBUMIN 1.8* 2.0*   BILITOT 0.3 0.3   ALKPHOS 232* 254*   AST 28 25   ALT 91* 86*   ANIONGAP 9 7*   EGFRNONAA >60 >60     Procalcitonin: No results for input(s): PROCAL in the last 48 hours.  Quantiferon: No results for input(s): NIL, TBAG, TBAGNIL, MITOGENNIL, TBGOLD in the last 48 hours.  Respiratory Culture:   Recent Labs   Lab 01/31/19  1035   GSRESP <10 epithelial cells per low power field.  Many WBC's  Many budding yeast   RESPIRATORYC No growth     Urine Culture: No results for input(s): LABURIN in the last 4320 hours.  Urine Studies:   Recent Labs   Lab 01/28/19  0734   COLORU Yellow   APPEARANCEUA Clear   PHUR 6.0   SPECGRAV 1.015   PROTEINUA Negative   GLUCUA Negative   KETONESU Negative   BILIRUBINUA Negative   OCCULTUA Negative   NITRITE Negative   UROBILINOGEN 2.0-3.0*   LEUKOCYTESUR Negative   SQUAMEPITHEL 1     Wound Culture: No results for input(s):  ERROL in the last 4320 hours.    Significant Imaging: I have reviewed all pertinent imaging results/findings within the past 24 hours.

## 2019-02-05 NOTE — ASSESSMENT & PLAN NOTE
Presumed PJP insetting of AIDS  Rocephin discontinued by ID  Bactrim transition from IV to p.o. today  Will DC IV steroids and begin prednisone 60 mg p.o. daily starting tomorrow and will plan on slow taper  Fluconazole transitioned to p.o. today  Wean oxygen as tolerated  Continue nebs and have BiPAP p.r.n.  Appreciate Pulmonary and ID input  Will follow up on remaining studies and will monitor over the next few days for progress  Will continue monitor with labs

## 2019-02-05 NOTE — PROGRESS NOTES
[facility]  Pulmonology  Progress Note    Patient Name: Lior Prado  MRN: [id number]  Admission Date: 1/28/2019  Hospital Length of Stay: 7 days  Code Status: Full Code  Attending Provider: Mckenzie Rodriguez MD  Primary Care Provider: Primary Doctor No   Principal Problem: AIDS    Subjective:     Interval History: NEFTALY, dyspnea and cough improving, HFNC being weaned Further    Objective:     Vital Signs (Most Recent):  Temp: 98.5 °F (36.9 °C) (02/04/19 1554)  Pulse: 101 (02/04/19 1554)  Resp: 18 (02/04/19 1554)  BP: 105/67 (02/04/19 1554)  SpO2: (!) 93 % (02/04/19 1554) Vital Signs (24h Range):  Temp:  [97.3 °F (36.3 °C)-98.6 °F (37 °C)] 98.5 °F (36.9 °C)  Pulse:  [] 101  Resp:  [16-20] 18  SpO2:  [92 %-97 %] 93 %  BP: (102-124)/(62-79) 105/67     Weight: 56.9 kg (125 lb 7.1 oz)  Body mass index is 20.25 kg/m².      Intake/Output Summary (Last 24 hours) at 2/4/2019 1848  Last data filed at 2/4/2019 1814  Gross per 24 hour   Intake 1220 ml   Output 3230 ml   Net -2010 ml       Physical Exam   Constitutional: He is oriented to person, place, and time. He has a sickly appearance.   emanciated   HENT:   Head: Normocephalic.   Eyes: Conjunctivae and EOM are normal. Pupils are equal, round, and reactive to light. No scleral icterus.   Cardiovascular: Normal rate, regular rhythm and normal heart sounds.   No murmur heard.  Pulmonary/Chest: Effort normal and breath sounds normal. He has no wheezes. He has no rhonchi.   Abdominal: Soft. Bowel sounds are normal. There is no hepatosplenomegaly. There is no tenderness. There is no guarding.   Musculoskeletal: He exhibits no edema.   Neurological: He is alert and oriented to person, place, and time.   Skin: No rash noted. No erythema.       Vents:  Vent Mode: NIV+ PC (02/02/19 0341)  Ventilator Initiated: Yes (01/31/19 1000)  Set Rate: 15 bmp (02/02/19 0341)  Vt Set: 430 mL (02/01/19 0700)  Pressure Support: 7 cmH20 (01/30/19 1111)  PEEP/CPAP: 5 cmH20  (02/02/19 0341)  Oxygen Concentration (%): 30 (02/04/19 1539)  Peak Airway Pressure: 12 cmH2O (02/02/19 0341)  Total Ve: 12.5 mL (02/02/19 0341)  F/VT Ratio<105 (RSBI): (!) 33.28 (02/02/19 0341)    Lines/Drains/Airways     Peripheral Intravenous Line                 Peripheral IV - Single Lumen 01/31/19 1243 Right Upper Arm 4 days         Peripheral IV - Single Lumen 01/31/19 1244 Anterior;Proximal;Right Forearm 4 days                Significant Labs:    CBC/Anemia Profile:  Recent Labs   Lab 02/03/19 0458 02/04/19 0717   WBC 4.42 5.06   HGB 8.3* 7.9*   HCT 25.7* 24.4*   PLT 32* 34*   MCV 76* 77*   RDW 15.0* 15.0*        Chemistries:  Recent Labs   Lab 02/03/19 0458 02/04/19 0717   * 134*   K 4.2 3.9    101   CO2 26 24   BUN 18 15   CREATININE 0.6 0.7   CALCIUM 7.9* 7.8*   ALBUMIN 1.7* 1.8*   PROT 5.4* 5.3*   BILITOT 0.3 0.3   ALKPHOS 286* 232*   * 91*   AST 55* 28       ABGs:   No results for input(s): PH, PCO2, HCO3, POCSATURATED, BE in the last 48 hours.    Significant Imaging:  CXR: I have reviewed all pertinent results/findings within the past 24 hours and my personal findings are:  no acute changes    Assessment/Plan:     Acute hypoxemic respiratory failure    Likely PJP PNA  --Wean HFNC as tolerates  --Abx/steroids per ID, would switch to prednisone and PO Bactrim    Please call with questions              Raz Palacios MD  Pulmonology  [facility]

## 2019-02-05 NOTE — SUBJECTIVE & OBJECTIVE
Interval History: NEFTALY, dyspnea and cough improving, HFNC being weaned Further    Objective:     Vital Signs (Most Recent):  Temp: 98.5 °F (36.9 °C) (02/04/19 1554)  Pulse: 101 (02/04/19 1554)  Resp: 18 (02/04/19 1554)  BP: 105/67 (02/04/19 1554)  SpO2: (!) 93 % (02/04/19 1554) Vital Signs (24h Range):  Temp:  [97.3 °F (36.3 °C)-98.6 °F (37 °C)] 98.5 °F (36.9 °C)  Pulse:  [] 101  Resp:  [16-20] 18  SpO2:  [92 %-97 %] 93 %  BP: (102-124)/(62-79) 105/67     Weight: 56.9 kg (125 lb 7.1 oz)  Body mass index is 20.25 kg/m².      Intake/Output Summary (Last 24 hours) at 2/4/2019 1848  Last data filed at 2/4/2019 1814  Gross per 24 hour   Intake 1220 ml   Output 3230 ml   Net -2010 ml       Physical Exam   Constitutional: He is oriented to person, place, and time. He has a sickly appearance.   emanciated   HENT:   Head: Normocephalic.   Eyes: Conjunctivae and EOM are normal. Pupils are equal, round, and reactive to light. No scleral icterus.   Cardiovascular: Normal rate, regular rhythm and normal heart sounds.   No murmur heard.  Pulmonary/Chest: Effort normal and breath sounds normal. He has no wheezes. He has no rhonchi.   Abdominal: Soft. Bowel sounds are normal. There is no hepatosplenomegaly. There is no tenderness. There is no guarding.   Musculoskeletal: He exhibits no edema.   Neurological: He is alert and oriented to person, place, and time.   Skin: No rash noted. No erythema.       Vents:  Vent Mode: NIV+ PC (02/02/19 0341)  Ventilator Initiated: Yes (01/31/19 1000)  Set Rate: 15 bmp (02/02/19 0341)  Vt Set: 430 mL (02/01/19 0700)  Pressure Support: 7 cmH20 (01/30/19 1111)  PEEP/CPAP: 5 cmH20 (02/02/19 0341)  Oxygen Concentration (%): 30 (02/04/19 1539)  Peak Airway Pressure: 12 cmH2O (02/02/19 0341)  Total Ve: 12.5 mL (02/02/19 0341)  F/VT Ratio<105 (RSBI): (!) 33.28 (02/02/19 0341)    Lines/Drains/Airways     Peripheral Intravenous Line                 Peripheral IV - Single Lumen 01/31/19 1243 Right  Upper Arm 4 days         Peripheral IV - Single Lumen 01/31/19 1244 Anterior;Proximal;Right Forearm 4 days                Significant Labs:    CBC/Anemia Profile:  Recent Labs   Lab 02/03/19 0458 02/04/19  0717   WBC 4.42 5.06   HGB 8.3* 7.9*   HCT 25.7* 24.4*   PLT 32* 34*   MCV 76* 77*   RDW 15.0* 15.0*        Chemistries:  Recent Labs   Lab 02/03/19 0458 02/04/19  0717   * 134*   K 4.2 3.9    101   CO2 26 24   BUN 18 15   CREATININE 0.6 0.7   CALCIUM 7.9* 7.8*   ALBUMIN 1.7* 1.8*   PROT 5.4* 5.3*   BILITOT 0.3 0.3   ALKPHOS 286* 232*   * 91*   AST 55* 28       ABGs:   No results for input(s): PH, PCO2, HCO3, POCSATURATED, BE in the last 48 hours.    Significant Imaging:  CXR: I have reviewed all pertinent results/findings within the past 24 hours and my personal findings are:  no acute changes

## 2019-02-06 LAB
ALBUMIN SERPL BCP-MCNC: 2.2 G/DL
ALP SERPL-CCNC: 240 U/L
ALT SERPL W/O P-5'-P-CCNC: 73 U/L
ANION GAP SERPL CALC-SCNC: 6 MMOL/L
ANISOCYTOSIS BLD QL SMEAR: ABNORMAL
AST SERPL-CCNC: 19 U/L
BASOPHILS NFR BLD: 0 %
BILIRUB SERPL-MCNC: 0.3 MG/DL
BUN SERPL-MCNC: 16 MG/DL
CALCIUM SERPL-MCNC: 8.6 MG/DL
CHLORIDE SERPL-SCNC: 98 MMOL/L
CO2 SERPL-SCNC: 27 MMOL/L
CREAT SERPL-MCNC: 0.7 MG/DL
DIFFERENTIAL METHOD: ABNORMAL
EOSINOPHIL NFR BLD: 0 %
ERYTHROCYTE [DISTWIDTH] IN BLOOD BY AUTOMATED COUNT: 16.9 %
EST. GFR  (AFRICAN AMERICAN): >60 ML/MIN/1.73 M^2
EST. GFR  (NON AFRICAN AMERICAN): >60 ML/MIN/1.73 M^2
G6PD RBC-CCNT: 10.4 U/G HGB (ref 7–20.5)
GLUCOSE SERPL-MCNC: 92 MG/DL
HCT VFR BLD AUTO: 28.6 %
HGB BLD-MCNC: 8.9 G/DL
HYPOCHROMIA BLD QL SMEAR: ABNORMAL
LYMPHOCYTES NFR BLD: 5 %
MCH RBC QN AUTO: 24.3 PG
MCHC RBC AUTO-ENTMCNC: 31.1 G/DL
MCV RBC AUTO: 78 FL
METAMYELOCYTES NFR BLD MANUAL: 3 %
MONOCYTES NFR BLD: 7 %
NEUTROPHILS NFR BLD: 80 %
NEUTS BAND NFR BLD MANUAL: 5 %
PLATELET # BLD AUTO: 41 K/UL
PLATELET BLD QL SMEAR: ABNORMAL
PMV BLD AUTO: ABNORMAL FL
POLYCHROMASIA BLD QL SMEAR: ABNORMAL
POTASSIUM SERPL-SCNC: 5.1 MMOL/L
PROT SERPL-MCNC: 6 G/DL
RBC # BLD AUTO: 3.66 M/UL
SODIUM SERPL-SCNC: 131 MMOL/L
WBC # BLD AUTO: 5.69 K/UL

## 2019-02-06 PROCEDURE — 25000242 PHARM REV CODE 250 ALT 637 W/ HCPCS: Performed by: NURSE PRACTITIONER

## 2019-02-06 PROCEDURE — 63600175 PHARM REV CODE 636 W HCPCS: Performed by: INTERNAL MEDICINE

## 2019-02-06 PROCEDURE — 25000003 PHARM REV CODE 250: Performed by: EMERGENCY MEDICINE

## 2019-02-06 PROCEDURE — 94761 N-INVAS EAR/PLS OXIMETRY MLT: CPT

## 2019-02-06 PROCEDURE — 21400001 HC TELEMETRY ROOM

## 2019-02-06 PROCEDURE — 94640 AIRWAY INHALATION TREATMENT: CPT

## 2019-02-06 PROCEDURE — 99232 PR SUBSEQUENT HOSPITAL CARE,LEVL II: ICD-10-PCS | Mod: ,,, | Performed by: INTERNAL MEDICINE

## 2019-02-06 PROCEDURE — 99232 SBSQ HOSP IP/OBS MODERATE 35: CPT | Mod: ,,, | Performed by: INTERNAL MEDICINE

## 2019-02-06 PROCEDURE — 85027 COMPLETE CBC AUTOMATED: CPT

## 2019-02-06 PROCEDURE — 99900035 HC TECH TIME PER 15 MIN (STAT)

## 2019-02-06 PROCEDURE — 80053 COMPREHEN METABOLIC PANEL: CPT

## 2019-02-06 PROCEDURE — 87305 ASPERGILLUS AG IA: CPT

## 2019-02-06 PROCEDURE — 27100171 HC OXYGEN HIGH FLOW UP TO 24 HOURS

## 2019-02-06 PROCEDURE — 27000221 HC OXYGEN, UP TO 24 HOURS

## 2019-02-06 PROCEDURE — 63600175 PHARM REV CODE 636 W HCPCS: Performed by: HOSPITALIST

## 2019-02-06 PROCEDURE — 25000003 PHARM REV CODE 250: Performed by: PHYSICIAN ASSISTANT

## 2019-02-06 PROCEDURE — 99233 PR SUBSEQUENT HOSPITAL CARE,LEVL III: ICD-10-PCS | Mod: ,,, | Performed by: PHYSICIAN ASSISTANT

## 2019-02-06 PROCEDURE — 99233 SBSQ HOSP IP/OBS HIGH 50: CPT | Mod: ,,, | Performed by: PHYSICIAN ASSISTANT

## 2019-02-06 PROCEDURE — 85007 BL SMEAR W/DIFF WBC COUNT: CPT

## 2019-02-06 RX ORDER — FUROSEMIDE 10 MG/ML
20 INJECTION INTRAMUSCULAR; INTRAVENOUS ONCE
Status: COMPLETED | OUTPATIENT
Start: 2019-02-06 | End: 2019-02-06

## 2019-02-06 RX ADMIN — FAMOTIDINE 20 MG: 20 TABLET ORAL at 09:02

## 2019-02-06 RX ADMIN — FAMOTIDINE 20 MG: 20 TABLET ORAL at 08:02

## 2019-02-06 RX ADMIN — IPRATROPIUM BROMIDE AND ALBUTEROL SULFATE 3 ML: .5; 3 SOLUTION RESPIRATORY (INHALATION) at 04:02

## 2019-02-06 RX ADMIN — SULFAMETHOXAZOLE AND TRIMETHOPRIM 2 TABLET: 800; 160 TABLET ORAL at 02:02

## 2019-02-06 RX ADMIN — SULFAMETHOXAZOLE AND TRIMETHOPRIM 2 TABLET: 800; 160 TABLET ORAL at 09:02

## 2019-02-06 RX ADMIN — FLUCONAZOLE 400 MG: 100 TABLET ORAL at 08:02

## 2019-02-06 RX ADMIN — SULFAMETHOXAZOLE AND TRIMETHOPRIM 2 TABLET: 800; 160 TABLET ORAL at 08:02

## 2019-02-06 RX ADMIN — IPRATROPIUM BROMIDE AND ALBUTEROL SULFATE 3 ML: .5; 3 SOLUTION RESPIRATORY (INHALATION) at 03:02

## 2019-02-06 RX ADMIN — IPRATROPIUM BROMIDE AND ALBUTEROL SULFATE 3 ML: .5; 3 SOLUTION RESPIRATORY (INHALATION) at 07:02

## 2019-02-06 RX ADMIN — IPRATROPIUM BROMIDE AND ALBUTEROL SULFATE 3 ML: .5; 3 SOLUTION RESPIRATORY (INHALATION) at 12:02

## 2019-02-06 RX ADMIN — PREDNISONE 60 MG: 20 TABLET ORAL at 08:02

## 2019-02-06 RX ADMIN — FUROSEMIDE 20 MG: 10 INJECTION, SOLUTION INTRAMUSCULAR; INTRAVENOUS at 02:02

## 2019-02-06 RX ADMIN — IPRATROPIUM BROMIDE AND ALBUTEROL SULFATE 3 ML: .5; 3 SOLUTION RESPIRATORY (INHALATION) at 11:02

## 2019-02-06 NOTE — ASSESSMENT & PLAN NOTE
Pancytopenia - multifactorial   HIV induced with worsening thrombocytopenia- drug induced +/-infection  Plt ct improved 41k   Cont to monitor, asympomatic

## 2019-02-06 NOTE — PROGRESS NOTES
"[facility]  Progress Note    Patient Name: Lior Prado  MRN: [id number]  Patient Class: IP- Inpatient   Admission Date: 1/28/2019  Length of Stay: 9 days  Attending Physician: Steph Guillen MD  Primary Care Provider: Primary Doctor No        Subjective:     Principal Problem:AIDS    HPI:  Mr. Donnelly is a 42 yo M with no reported past medical history- presents with a CC of weakness and some SOB for one week. He notes that he was treated with abx for a "pneumonia" 1/13-1/23.  He presents to the ED and is found to have thrush. Further, his HIV is +. The patient was started on treatment for PJP pneumonia and ID was consulted. The patient is thin and ill appearing.      Hospital Course:  Mr. Donnelly presented with shortness of breath and was admitted for treatment of PJP pneumonia and ID was consulted. The patient had a CTA of his chest that was negative for PE but was remarkable for a 1.8 cm irregular, cavitary opacity within the anterior segment of the right upper lobe, innumerable randomly distributed bilateral pulmonary nodules, patchy areas of subsegmental consolidation and nodular thickening of the right major and minor fissures, and mediastinal adenopathy.  Patient was started on empiric IV Bactrim and steroids.  He was transferred to the ICU due to high O2 requirements, rising lactate, and marginal blood pressures.  Pancytopenia was also present and worsened.  He was transfused 1 unit of platelets.  His blood pressures responded to IV fluid to alone and he did not require vasopressors. Pulmonology consulted for possible bronch in new diagnosis of HIV, and concern for PJP.  ID was also consulted.  On 1/30, CD4 count resulted at 11.  ID recommended bactrim and rocephin for probable PJP and CAP, respectively. Diflucan for oral candida. Pancytopenia likely due to sepsis, possible opportunistic infections and AIDS. Transaminases and lactate/LDH rising. On 1/31, " patient underwent bronchoscopy and remained on the ventilator postprocedure.  He was later extubated on 2/01 with use of BiPAP p.r.n. Rapid AFB stain resulted negative and was taken off isolation on 2/2.  His oxygen requirements continued to decline and liver function tests continue to improve.  He was stable for transfer to floor on 2/3.    Interval History: supplemental O2 requirements have decreased. Feels well today.     Review of Systems   Respiratory: Positive for shortness of breath (with exertion, but less).    Cardiovascular: Negative.    Gastrointestinal: Negative.      Objective:     Vital Signs (Most Recent):  Temp: 98.8 °F (37.1 °C) (02/06/19 1055)  Pulse: 87 (02/06/19 1114)  Resp: 18 (02/06/19 1114)  BP: 104/63 (02/06/19 1055)  SpO2: (!) 93 % (02/06/19 1114) Vital Signs (24h Range):  Temp:  [97.9 °F (36.6 °C)-98.8 °F (37.1 °C)] 98.8 °F (37.1 °C)  Pulse:  [] 87  Resp:  [16-20] 18  SpO2:  [93 %-99 %] 93 %  BP: (101-110)/(59-69) 104/63     Weight: 52.9 kg (116 lb 10 oz)  Body mass index is 18.82 kg/m².    Intake/Output Summary (Last 24 hours) at 2/6/2019 1305  Last data filed at 2/6/2019 0500  Gross per 24 hour   Intake 480 ml   Output 2200 ml   Net -1720 ml      Physical Exam   Constitutional: He is oriented to person, place, and time. He appears well-developed. No distress.   Cachectic, [place]er-speaking only   HENT:   + temporal wasting   Eyes: No scleral icterus.   Cardiovascular: Normal rate, regular rhythm and normal heart sounds.   No murmur heard.  Pulmonary/Chest: Effort normal. No respiratory distress. He has no wheezes. He has no rhonchi. He exhibits no tenderness.   Rales throughout, less   Abdominal: Soft. Bowel sounds are normal. There is no hepatosplenomegaly. There is no tenderness. There is no guarding.   Musculoskeletal: Normal range of motion. He exhibits no edema.   Neurological: He is alert and oriented to person, place, and time.   Skin: No rash noted. He is not diaphoretic. No  erythema.   Nursing note and vitals reviewed.      Significant Labs: All pertinent labs within the past 24 hours have been reviewed.    Significant Imaging: I have reviewed all pertinent imaging results/findings within the past 24 hours.  I have reviewed and interpreted all pertinent imaging results/findings within the past 24 hours.    Assessment/Plan:      * AIDS    Pt was unaware of virus and does not want information to be shared with anyone  He reported he has a wife in [place] who is healthy   Pt reportedly denied intercourse x 7 years, no IV drug use, no incarceration, no blood transfusions, professional tattoos with clean needles to his knowledge, no visits to [place] in 7 years, no other sick contacts   CD4 count is 11, and with opportunistic infection consistent with AIDS  Patient currently on treatment for PJP pneumonia with bactrim (x 21 days , then maintenance until follow up) and steroids. Will plan to start steroid taper tomorrow if O2 requirements continue to improve.   Plan to initiate antiretroviral therapy as outpatient once further testing done. However, patient states he does not want to initiate antiretroviral therapy because it is toxic. I spent > 5 min discussing the dangers of not treating HIV/AIDS (in [place]ian). Verbalized understanding but still wants to antiretroviral therapy.   Patient is undocumented, and resources will be difficult.    consulted for community resources. ID recommends f/u at [facility] with Dr Shine where there is Slovenian speaking staff. I discussed this at [facility] today.      Acute hypoxemic respiratory failure    Secondary to probable PJP  TB has been ruled out  Pulmonary following and patient is status post bronch  Extubated on 2/1 and patient tolerating weaning of oxygen  Clinically improving but still on comfort flow. Trial of furosemide today     Pneumonia, unspecified organism    Presumed PJP insetting of AIDS  Rocephin discontinued by ID  Bactrim  now transitioned to PO. On prednisone. Will need taper  Weaning oxygen as tolerated             Pancytopenia    Secondary to AIDS and/or medication induced  Thrombocytopenia 12k s/p transfusion of 1 unit platelets prior to bronch  Appreciate Heme-Onc input  Platelets overall stable  Will continue to monitor     Elevated LFTs    Likely due to sepsis and/or AIDS  Hepatitis panel negative  No abdominal pain and bilirubin not suggesting obstructive pattern or HIV cholangiopathy   U/S with fatty liver   LFTs are improving, will continue to monitor         Malnutrition of moderate degree    Pt did not comment on changes in diet  Due to AIDS  Will supplement nutrition with boost glucose control     Tobacco abuse    Smoking cessation education with > 3 min of counseling  Nicotine patch offered        Hyponatremia    Improved with IVF  Will continue monitor.     Weakness    PT/OT        Thrush    Change fluconazole to p.o., 400 mg p.o. daily       VTE Risk Mitigation (From admission, onward)        Ordered     IP VTE LOW RISK PATIENT  Once      01/28/19 1927     Place DENY hose  Until discontinued      01/28/19 1927              Steph Edmondson MD  Department of Hospital Medicine   [facility]

## 2019-02-06 NOTE — PROGRESS NOTES
"[facility] [place]  [place] Note    SUMMARY       Recommendations    1. Encourage adequate intake of meals & oral nutr supplement daily  Goals: Maintain meal intake >50% daily  Nutrition Goal Status: goal met  Communication of RD Recs: (plan of care)    Reason for Assessment    Reason For Assessment: RD follow-up  Diagnosis: infection/sepsis(newly dx'd HIV)  Relevant Medical History: none noted  Interdisciplinary Rounds: did not attend  General Information Comments: Pt stepped down out of ICU over the wkend. S/p bronch w/ negative TB results. Pt's meal intake is ~75% on average. RN reports pt has not been receiving Boost consistently. ICU RN documentation on flowsheet indicates pt was drinking it when offered. NFPE performed 1/30; see malnutrition section of note for details. ID notes re: AIDS noted.    Nutrition Discharge Planning: Adequate intake of meals to meet nutr needs    Nutrition Risk Screen    Nutrition Risk Screen: other (see comments)(cachetic)    Nutrition/Diet History    Spiritual, Cultural Beliefs, Yazdanism Practices, Values that Affect Care: no  Factors Affecting Nutritional Intake: None identified at this time    Anthropometrics    Temp: 98.8 °F (37.1 °C)  Height Method: Stated  Height: 5' 6" (167.6 cm)  Height (inches): 66 in  Weight Method: Bed Scale  Weight: 52.9 kg (116 lb 10 oz)  Weight (lb): 116.62 lb  Ideal Body Weight (IBW), Male: 142 lb  % Ideal Body Weight, Male (lb): 82.13 lb  BMI (Calculated): 18.9  BMI Grade: 18.5-24.9 - normal  Usual Body Weight (UBW), kg: (Unable to obtain)       Lab/Procedures/Meds    Pertinent Labs Reviewed: reviewed  Pertinent Labs Comments: Na 131, Alb 2.2  Pertinent Medications Reviewed: reviewed  Pertinent Medications Comments: famotidine, lasix, prednisone    Estimated/Assessed Needs    Weight Used For Calorie Calculations: 52.9 kg (116 lb 10 oz)  Energy Calorie Requirements (kcal): 0341-7166  Energy Need Method: Kcal/kg(35-40) "     Protein Requirements: 69-80 g (1.3-1.5 g/kg)  Weight Used For Protein Calculations: 52.9 kg (116 lb 10 oz)     Estimated Fluid Requirement Method: RDA Method  RDA Method (mL): 1851     Nutrition Prescription Ordered    Current Diet Order: Regular  Oral Nutrition Supplement: Boost Plus TID    Evaluation of Received Nutrient/Fluid Intake    IV Fluid (mL): (discontinued)  I/O: +5.5 L x 24 hrs; +9.339 L since admit  Energy Calories Required: meeting needs  Protein Required: meeting needs  Fluid Required: meeting needs  Comments: LBM 2/5  Tolerance: tolerating  % Intake of Estimated Energy Needs: 75 - 100 % b/w intake of meals & Boost  % Meal Intake: 75  %    Nutrition Risk    Level of Risk/Frequency of Follow-up: (F/u 1 x weekly)     Assessment and Plan    Malnutrition of moderate degree    Malnutrition in the context of Acute Illness/Injury    Related to (etiology):  New dx of HIV    Signs and Symptoms (as evidenced by):  Body Fat Depletion: mild depletion of orbitals, triceps and thoracic and lumbar region   Muscle Mass Depletion: mild and moderate depletion of temples, clavicle region, interosseous muscle and lower extremities     Interventions/Recommendations (treatment strategy):  Commercial beverage    Nutrition Diagnosis Status:  Continues              Monitor and Evaluation    Food and Nutrient Intake: energy intake, food and beverage intake  Food and Nutrient Adminstration: diet order  Physical Activity and Function: nutrition-related ADLs and IADLs  Anthropometric Measurements: weight, weight change  Biochemical Data, Medical Tests and Procedures: electrolyte and renal panel, glucose/endocrine profile, inflammatory profile  Nutrition-Focused Physical Findings: overall appearance     Malnutrition Assessment  Malnutrition Type: acute illness or injury              Orbital Region (Subcutaneous Fat Loss): mild depletion  Upper Arm Region (Subcutaneous Fat Loss): mild depletion  Thoracic and Lumbar Region: mild  depletion   Oriental orthodox Region (Muscle Loss): mild depletion  Clavicle Bone Region (Muscle Loss): moderate depletion  Clavicle and Acromion Bone Region (Muscle Loss): moderate depletion  Dorsal Hand (Muscle Loss): mild depletion  Patellar Region (Muscle Loss): mild depletion  Anterior Thigh Region (Muscle Loss): mild depletion  Posterior Calf Region (Muscle Loss): mild depletion       Subcutaneous Fat Loss (Final Summary): moderate protein-calorie malnutrition  Muscle Loss Evaluation (Final Summary): moderate protein-calorie malnutrition         Nutrition Follow-Up    RD Follow-up?: Yes

## 2019-02-06 NOTE — PROGRESS NOTES
[facility]  Hematology/Oncology  Progress Note    Patient Name: Lior Prado  Admission Date: 1/28/2019  Hospital Length of Stay: 9 days  Code Status: Full Code     Subjective:     HPI:  Pt is a Senegalese speaking 42 y/o male with no pmhx presented with generalized weakness and increasing SOB and cough x 1 week.  No hemoptysis/chest pain. Patient reports he has been losing weight.  He also reports fevers night sweats for the past week.  In the ED patient was also diagnosed with thrush.  He is HIV positive.  He was started on treatment for  PJP pneumonia. Pt in ICU for 02 requirements secondary to hypoxia.  CBC on admit 01/28/2018 reveals white blood cell count of 3.57 hemoglobin of 10.5 grams/deciliter hematocrit 30.7% and a platelet count of 75k.  This platelet count trending during hospitalization to 12 K.  Patient has undergone platelet transfusion with recent platelet counts of 20 K. No active bleeding.  Consulted for thrombocytopenia.    No new subjective & objective note has been filed under this hospital service since the last note was generated.    Assessment/Plan:     * AIDS    Followed by ID   CD4 count 11  Pt undergoing  Bactrim and steroids for PJP pneumonia prophylaxis.        Acute hypoxemic respiratory failure    Pulm following  Pt treated with  antibiotics including bactrim and  steroids for suspected PJP and PNA  TB ruled out  CTA shows 1.6 cm cavitation lesion of RUL  S/p FOB  -AFB cx NGTD  Pt extubated 2/1/2019       Pancytopenia    Pancytopenia - multifactorial   HIV induced with worsening thrombocytopenia- drug induced +/-infection  Plt ct improved 41k   Cont to monitor, asympomatic                Pneumonia, unspecified organism    Pt undergoing Bactrim and steroids for presumed PJP                    Caro Rendon MD  Hematology/Oncology  [facility]

## 2019-02-06 NOTE — PROGRESS NOTES
Pulmonary Update Note  2/6/2019     Patient seems to be progressive as expected.    1. Continue treatment for PJP  2. Will need repeat CT chest in 4-6 weeks.    Thank you for the consult.  Please call with questions.    Raz Palacios MD  Department of Pulmonary & Critical Care  Cell: [phone]

## 2019-02-06 NOTE — PROGRESS NOTES
"[facility]  Infectious Disease  Progress Note    Patient Name: Lior Prado  MRN: [id number]  Admission Date: 1/28/2019  Length of Stay: 9 days  Attending Physician: Steph Guillen MD  Primary Care Provider: Primary Doctor No    Isolation Status: No active isolations  Assessment/Plan:      * AIDS    44 y/o Nigerien speaking male with no significant medical history presents with progressively worsening SOB, cough, weakness and weight loss. Denied hemoptysis or sputum production. He was found to be positive for HIV with CD4 count of 11. ID work up initiated:    Blood cultures 1/28 NGTD  CXR with concerns for PCP pneumonia; CTA shows 1.8 cm irregular, cavitary opacity within the anterior segment of the right upper lobe  Pt now S/p bronchoscopy; AFB cultures NGTD, cytology not sent??  Fungitell positive  TB PCR NGTD; quant gold indeterminant; repeated yesterday  Crypto negative  c diff negative; stool culture negative  RPR/toxo negative  Aspergillus antigen positive    Plan:  1. Continue bactrim  DS 2 tablets PO TID for a total of 21 days; will need ID outpatient f/u; once patient completes 21 day course of bactrim, would then treat with bactrim DS once daily until followed up  2. Hold off on MAC prophylaxis for now but will need to start as outpatient given CD4 of 11  3. Continue fluconazole 400 mg PO   4. Continue prednisone- will need taper for PJP pneumonia  5. Aspergillus antigen repeated and pending; will need to be followed up as outpatient  6. Will hold off on starting antiviral therapy for now as starting medication can cause risk of IRIS and pt not currently wanting to start HIV meds; pt will need f/u as outpatient; per Dr. Escalera "Dr Shine at [facility] is Nigerien speaking and she may be a good provider for this patient to see outpatient  7. ID will sign off- please re-consult for questions         Discussed with ID staff, Dr. Poon  Thank you for your consult. I will sign " off. Please contact us if you have any additional questions.  ANN Marc  Infectious Disease  [facility]    Subjective:     Principal Problem:AIDS    HPI: 44 y/o Thai speaking male with no significant medical history presents with progressively worsening SOB and weakness for the past week. Associated symptoms include dizziness. He reports having a cough (mostly at nights) for the past week. Denies hemoptysis or sputum production. He denies having any fever chills or night sweats at home. No n/v/d,dysuria. He reports having dyspnea on exertion and 6kg weight loss. On admission he was found to be febrile and significantly diaphoretic. His HIV rapid test is positive. Concerns for PJP pneumonia. He is thin and ill appearing.    He works as a . He denies incarceration or homelessness. He denies knowing about HIV diagnosis. He smokes, denies IVDU or illicit drug use. He denies exposure to TB. No significant allergies at this time.     Blood cultures NGTD  He is febrile 101 and tachycardic  Interval History: NAEO. Still on oxygen.    Review of Systems   Constitutional: Negative for chills and fever.   Respiratory: Positive for cough and shortness of breath.    Gastrointestinal: Negative for diarrhea, nausea and vomiting.   Skin: Negative for wound.   Neurological: Positive for weakness.     Objective:     Vital Signs (Most Recent):  Temp: 98.8 °F (37.1 °C) (02/06/19 1055)  Pulse: 87 (02/06/19 1114)  Resp: 18 (02/06/19 1114)  BP: 104/63 (02/06/19 1055)  SpO2: (!) 93 % (02/06/19 1114) Vital Signs (24h Range):  Temp:  [97.9 °F (36.6 °C)-98.8 °F (37.1 °C)] 98.8 °F (37.1 °C)  Pulse:  [] 87  Resp:  [16-20] 18  SpO2:  [93 %-99 %] 93 %  BP: (101-110)/(59-69) 104/63     Weight: 52.9 kg (116 lb 10 oz)  Body mass index is 18.82 kg/m².    Estimated Creatinine Clearance: 101.8 mL/min (based on SCr of 0.7 mg/dL).    Physical Exam   Constitutional: He appears cachectic. No distress.    Thin, ill appearing   HENT:   Head: Normocephalic.   Cardiovascular: Regular rhythm and normal heart sounds. Exam reveals no friction rub.   No murmur heard.  Pulmonary/Chest: No stridor. No respiratory distress. He has no wheezes.   oxygen   Abdominal: Soft. He exhibits no distension. There is no tenderness. There is no guarding.   Musculoskeletal: He exhibits no tenderness.   Skin: Skin is warm. He is not diaphoretic. No erythema. No pallor.   Psychiatric: He has a normal mood and affect.   Vitals reviewed.      Significant Labs:   Blood Culture:   Recent Labs   Lab 01/28/19  1023 01/28/19  1028   LABBLOO No growth after 5 days. No growth after 5 days.     CBC:   Recent Labs   Lab 02/05/19  0442 02/06/19  0439   WBC 5.43 5.69   HGB 8.2* 8.9*   HCT 25.8* 28.6*   PLT 34* 41*     CMP:   Recent Labs   Lab 02/05/19  0442 02/06/19  0439   * 131*   K 4.6 5.1    98   CO2 25 27   * 92   BUN 14 16   CREATININE 0.6 0.7   CALCIUM 8.3* 8.6*   PROT 5.6* 6.0   ALBUMIN 2.0* 2.2*   BILITOT 0.3 0.3   ALKPHOS 254* 240*   AST 25 19   ALT 86* 73*   ANIONGAP 7* 6*   EGFRNONAA >60 >60       Significant Imaging: I have reviewed all pertinent imaging results/findings within the past 24 hours.

## 2019-02-06 NOTE — UM SECONDARY REVIEW
[facility]    Level of Care Issue     pt is new dx HIV/ AIDS and undocumented immigrant , no insurance. Plan is to follow up w/ Dr. Shine at [facility] on dc . they have [place] Island[place] speaking staff there. Weaning down oxygen. Pt currently on 5L. hopefully will be able to wean down to RA and possible dc tomorrow      VS: 98.8, 95, 18, 104/63, 96% 5L high flow NC    meds  Duonebs q 4hrs  Luconazole 400mg PO daily  Lasix 20mg IV x 1  Prednisone 60mg PO daily  Bactrim PO TID    44 yo male presented with shortness of breath and was admitted for treatment of PJP pneumonia and ID was consulted. The patient had a CTA of his chest that was negative for PE but was remarkable for a 1.8 cm irregular, cavitary opacity within the anterior segment of the right upper lobe, innumerable randomly distributed bilateral pulmonary nodules, patchy areas of subsegmental consolidation and nodular thickening of the right major and minor fissures, and mediastinal adenopathy.  Patient was started on empiric IV Bactrim and steroids.  He was transferred to the ICU due to high O2 requirements, rising lactate, and marginal blood pressures.  Pancytopenia was also present and worsened.  He was transfused 1 unit of platelets.  His blood pressures responded to IV fluid to alone and he did not require vasopressors. Pulmonology consulted for possible bronch in new diagnosis of HIV, and concern for PJP.  ID was also consulted.  On 1/30, CD4 count resulted at 11.  ID recommended bactrim and rocephin for probable PJP and CAP, respectively. Diflucan for oral candida. Pancytopenia likely due to sepsis, possible opportunistic infections and AIDS. Transaminases and lactate/LDH rising. On 1/31, patient underwent bronchoscopy and remained on the ventilator postprocedure.  He was later extubated on 2/01 with use of BiPAP p.r.n. Rapid AFB stain resulted negative and was taken off isolation on 2/2.  His oxygen requirements continued to decline and liver  function tests continue to improve.  He was stable for transfer to floor on 2/3.      Approved Inpatient     Agree w/ plan of care

## 2019-02-06 NOTE — ASSESSMENT & PLAN NOTE
Malnutrition in the context of Acute Illness/Injury    Related to (etiology):  New dx of HIV    Signs and Symptoms (as evidenced by):  Body Fat Depletion: mild depletion of orbitals, triceps and thoracic and lumbar region   Muscle Mass Depletion: mild and moderate depletion of temples, clavicle region, interosseous muscle and lower extremities     Interventions/Recommendations (treatment strategy):  Commercial beverage    Nutrition Diagnosis Status:  Continues

## 2019-02-06 NOTE — SUBJECTIVE & OBJECTIVE
Interval History: supplemental O2 requirements have decreased. Feels well today.     Review of Systems   Respiratory: Positive for shortness of breath (with exertion, but less).    Cardiovascular: Negative.    Gastrointestinal: Negative.      Objective:     Vital Signs (Most Recent):  Temp: 98.8 °F (37.1 °C) (02/06/19 1055)  Pulse: 87 (02/06/19 1114)  Resp: 18 (02/06/19 1114)  BP: 104/63 (02/06/19 1055)  SpO2: (!) 93 % (02/06/19 1114) Vital Signs (24h Range):  Temp:  [97.9 °F (36.6 °C)-98.8 °F (37.1 °C)] 98.8 °F (37.1 °C)  Pulse:  [] 87  Resp:  [16-20] 18  SpO2:  [93 %-99 %] 93 %  BP: (101-110)/(59-69) 104/63     Weight: 52.9 kg (116 lb 10 oz)  Body mass index is 18.82 kg/m².    Intake/Output Summary (Last 24 hours) at 2/6/2019 1305  Last data filed at 2/6/2019 0500  Gross per 24 hour   Intake 480 ml   Output 2200 ml   Net -1720 ml      Physical Exam   Constitutional: He is oriented to person, place, and time. He appears well-developed. No distress.   Cachectic, Nigerian-speaking only   HENT:   + temporal wasting   Eyes: No scleral icterus.   Cardiovascular: Normal rate, regular rhythm and normal heart sounds.   No murmur heard.  Pulmonary/Chest: Effort normal. No respiratory distress. He has no wheezes. He has no rhonchi. He exhibits no tenderness.   Rales throughout, less   Abdominal: Soft. Bowel sounds are normal. There is no hepatosplenomegaly. There is no tenderness. There is no guarding.   Musculoskeletal: Normal range of motion. He exhibits no edema.   Neurological: He is alert and oriented to person, place, and time.   Skin: No rash noted. He is not diaphoretic. No erythema.   Nursing note and vitals reviewed.      Significant Labs: All pertinent labs within the past 24 hours have been reviewed.    Significant Imaging: I have reviewed all pertinent imaging results/findings within the past 24 hours.  I have reviewed and interpreted all pertinent imaging results/findings within the past 24 hours.

## 2019-02-06 NOTE — SUBJECTIVE & OBJECTIVE
Interval History: NAEO. Still on oxygen.    Review of Systems   Constitutional: Negative for chills and fever.   Respiratory: Positive for cough and shortness of breath.    Gastrointestinal: Negative for diarrhea, nausea and vomiting.   Skin: Negative for wound.   Neurological: Positive for weakness.     Objective:     Vital Signs (Most Recent):  Temp: 98.8 °F (37.1 °C) (02/06/19 1055)  Pulse: 87 (02/06/19 1114)  Resp: 18 (02/06/19 1114)  BP: 104/63 (02/06/19 1055)  SpO2: (!) 93 % (02/06/19 1114) Vital Signs (24h Range):  Temp:  [97.9 °F (36.6 °C)-98.8 °F (37.1 °C)] 98.8 °F (37.1 °C)  Pulse:  [] 87  Resp:  [16-20] 18  SpO2:  [93 %-99 %] 93 %  BP: (101-110)/(59-69) 104/63     Weight: 52.9 kg (116 lb 10 oz)  Body mass index is 18.82 kg/m².    Estimated Creatinine Clearance: 101.8 mL/min (based on SCr of 0.7 mg/dL).    Physical Exam   Constitutional: He appears cachectic. No distress.   Thin, ill appearing   HENT:   Head: Normocephalic.   Cardiovascular: Regular rhythm and normal heart sounds. Exam reveals no friction rub.   No murmur heard.  Pulmonary/Chest: No stridor. No respiratory distress. He has no wheezes.   oxygen   Abdominal: Soft. He exhibits no distension. There is no tenderness. There is no guarding.   Musculoskeletal: He exhibits no tenderness.   Skin: Skin is warm. He is not diaphoretic. No erythema. No pallor.   Psychiatric: He has a normal mood and affect.   Vitals reviewed.      Significant Labs:   Blood Culture:   Recent Labs   Lab 01/28/19  1023 01/28/19  1028   LABBLOO No growth after 5 days. No growth after 5 days.     CBC:   Recent Labs   Lab 02/05/19  0442 02/06/19  0439   WBC 5.43 5.69   HGB 8.2* 8.9*   HCT 25.8* 28.6*   PLT 34* 41*     CMP:   Recent Labs   Lab 02/05/19  0442 02/06/19  0439   * 131*   K 4.6 5.1    98   CO2 25 27   * 92   BUN 14 16   CREATININE 0.6 0.7   CALCIUM 8.3* 8.6*   PROT 5.6* 6.0   ALBUMIN 2.0* 2.2*   BILITOT 0.3 0.3   ALKPHOS 254* 240*   AST 25  19   ALT 86* 73*   ANIONGAP 7* 6*   EGFRNONAA >60 >60       Significant Imaging: I have reviewed all pertinent imaging results/findings within the past 24 hours.

## 2019-02-06 NOTE — ASSESSMENT & PLAN NOTE
"44 y/o Comoran speaking male with no significant medical history presents with progressively worsening SOB, cough, weakness and weight loss. Denied hemoptysis or sputum production. He was found to be positive for HIV with CD4 count of 11. ID work up initiated:    Blood cultures 1/28 NGTD  CXR with concerns for PCP pneumonia; CTA shows 1.8 cm irregular, cavitary opacity within the anterior segment of the right upper lobe  Pt now S/p bronchoscopy; AFB cultures NGTD, cytology not sent??  Fungitell positive  TB PCR NGTD; quant gold indeterminant; repeated yesterday  Crypto negative  c diff negative; stool culture negative  RPR/toxo negative  Aspergillus antigen positive    Plan:  1. Continue bactrim  DS 2 tablets PO TID for a total of 21 days; will need ID outpatient f/u; once patient completes 21 day course of bactrim, would then treat with bactrim DS once daily until followed up  2. Hold off on MAC prophylaxis for now but will need to start as outpatient given CD4 of 11  3. Continue fluconazole 400 mg PO   4. Continue prednisone- will need taper for PJP pneumonia  5. Aspergillus antigen repeated and pending; will need to be followed up as outpatient  6. Will hold off on starting antiviral therapy for now as starting medication can cause risk of IRIS and pt not currently wanting to start HIV meds; pt will need f/u as outpatient; per Dr. Escalera "Dr Shine at [facility] is [place] speaking and she may be a good provider for this patient to see outpatient  7. ID will sign off- please re-consult for questions    "

## 2019-02-07 LAB
ALBUMIN SERPL BCP-MCNC: 2.5 G/DL
ALP SERPL-CCNC: 287 U/L
ALT SERPL W/O P-5'-P-CCNC: 79 U/L
ANION GAP SERPL CALC-SCNC: 7 MMOL/L
ANISOCYTOSIS BLD QL SMEAR: ABNORMAL
AST SERPL-CCNC: 22 U/L
BASOPHILS # BLD AUTO: 0.02 K/UL
BASOPHILS NFR BLD: 0.3 %
BILIRUB SERPL-MCNC: 0.4 MG/DL
BUN SERPL-MCNC: 24 MG/DL
CALCIUM SERPL-MCNC: 8.9 MG/DL
CHLORIDE SERPL-SCNC: 95 MMOL/L
CO2 SERPL-SCNC: 27 MMOL/L
CREAT SERPL-MCNC: 0.7 MG/DL
DIFFERENTIAL METHOD: ABNORMAL
EOSINOPHIL # BLD AUTO: 0 K/UL
EOSINOPHIL NFR BLD: 0.3 %
ERYTHROCYTE [DISTWIDTH] IN BLOOD BY AUTOMATED COUNT: 18 %
EST. GFR  (AFRICAN AMERICAN): >60 ML/MIN/1.73 M^2
EST. GFR  (NON AFRICAN AMERICAN): >60 ML/MIN/1.73 M^2
GALACTOMANNAN AG SERPL IA-ACNC: >=3.75 INDEX
GLUCOSE SERPL-MCNC: 90 MG/DL
HCT VFR BLD AUTO: 34.1 %
HGB BLD-MCNC: 11.1 G/DL
HISTOPLASMA AG VALUE: >23 NG/ML
HISTOPLASMA ANTIGEN URINE: POSITIVE
HYPOCHROMIA BLD QL SMEAR: ABNORMAL
LA PPP-IMP: NORMAL
LYMPHOCYTES # BLD AUTO: 0.2 K/UL
LYMPHOCYTES NFR BLD: 2.9 %
MCH RBC QN AUTO: 25.7 PG
MCHC RBC AUTO-ENTMCNC: 32.6 G/DL
MCV RBC AUTO: 79 FL
MONOCYTES # BLD AUTO: 0.3 K/UL
MONOCYTES NFR BLD: 4.6 %
NEUTROPHILS # BLD AUTO: 5.7 K/UL
NEUTROPHILS NFR BLD: 96.4 %
OVALOCYTES BLD QL SMEAR: ABNORMAL
PLATELET # BLD AUTO: 45 K/UL
PLATELET BLD QL SMEAR: ABNORMAL
PMV BLD AUTO: ABNORMAL FL
POIKILOCYTOSIS BLD QL SMEAR: SLIGHT
POLYCHROMASIA BLD QL SMEAR: ABNORMAL
POTASSIUM SERPL-SCNC: 4.7 MMOL/L
PROT SERPL-MCNC: 6.8 G/DL
RBC # BLD AUTO: 4.32 M/UL
SODIUM SERPL-SCNC: 129 MMOL/L
WBC # BLD AUTO: 6.24 K/UL

## 2019-02-07 PROCEDURE — 85025 COMPLETE CBC W/AUTO DIFF WBC: CPT

## 2019-02-07 PROCEDURE — 21400001 HC TELEMETRY ROOM

## 2019-02-07 PROCEDURE — 80053 COMPREHEN METABOLIC PANEL: CPT

## 2019-02-07 PROCEDURE — 63600175 PHARM REV CODE 636 W HCPCS: Performed by: HOSPITALIST

## 2019-02-07 PROCEDURE — 25000003 PHARM REV CODE 250: Performed by: PHYSICIAN ASSISTANT

## 2019-02-07 PROCEDURE — 27000221 HC OXYGEN, UP TO 24 HOURS

## 2019-02-07 PROCEDURE — 63600175 PHARM REV CODE 636 W HCPCS: Performed by: INTERNAL MEDICINE

## 2019-02-07 PROCEDURE — 94760 N-INVAS EAR/PLS OXIMETRY 1: CPT

## 2019-02-07 PROCEDURE — 36415 COLL VENOUS BLD VENIPUNCTURE: CPT

## 2019-02-07 PROCEDURE — 25000242 PHARM REV CODE 250 ALT 637 W/ HCPCS: Performed by: NURSE PRACTITIONER

## 2019-02-07 PROCEDURE — 94761 N-INVAS EAR/PLS OXIMETRY MLT: CPT

## 2019-02-07 PROCEDURE — 94640 AIRWAY INHALATION TREATMENT: CPT

## 2019-02-07 PROCEDURE — 25000003 PHARM REV CODE 250: Performed by: EMERGENCY MEDICINE

## 2019-02-07 RX ORDER — PREDNISONE 20 MG/1
40 TABLET ORAL DAILY
Status: DISCONTINUED | OUTPATIENT
Start: 2019-02-08 | End: 2019-02-10

## 2019-02-07 RX ORDER — PREDNISONE 10 MG/1
TABLET ORAL
Qty: 22 TABLET | Refills: 0 | Status: SHIPPED | OUTPATIENT
Start: 2019-02-07 | End: 2019-02-13 | Stop reason: HOSPADM

## 2019-02-07 RX ORDER — FUROSEMIDE 10 MG/ML
20 INJECTION INTRAMUSCULAR; INTRAVENOUS ONCE
Status: COMPLETED | OUTPATIENT
Start: 2019-02-07 | End: 2019-02-07

## 2019-02-07 RX ORDER — FLUCONAZOLE 100 MG/1
400 TABLET ORAL DAILY
Status: DISCONTINUED | OUTPATIENT
Start: 2019-02-08 | End: 2019-02-08

## 2019-02-07 RX ORDER — SULFAMETHOXAZOLE AND TRIMETHOPRIM 800; 160 MG/1; MG/1
2 TABLET ORAL 3 TIMES DAILY
Qty: 60 TABLET | Refills: 0 | Status: SHIPPED | OUTPATIENT
Start: 2019-02-07 | End: 2019-02-13 | Stop reason: HOSPADM

## 2019-02-07 RX ADMIN — FAMOTIDINE 20 MG: 20 TABLET ORAL at 08:02

## 2019-02-07 RX ADMIN — IPRATROPIUM BROMIDE AND ALBUTEROL SULFATE 3 ML: .5; 3 SOLUTION RESPIRATORY (INHALATION) at 11:02

## 2019-02-07 RX ADMIN — SULFAMETHOXAZOLE AND TRIMETHOPRIM 2 TABLET: 800; 160 TABLET ORAL at 08:02

## 2019-02-07 RX ADMIN — IPRATROPIUM BROMIDE AND ALBUTEROL SULFATE 3 ML: .5; 3 SOLUTION RESPIRATORY (INHALATION) at 12:02

## 2019-02-07 RX ADMIN — IPRATROPIUM BROMIDE AND ALBUTEROL SULFATE 3 ML: .5; 3 SOLUTION RESPIRATORY (INHALATION) at 04:02

## 2019-02-07 RX ADMIN — PREDNISONE 60 MG: 20 TABLET ORAL at 08:02

## 2019-02-07 RX ADMIN — FUROSEMIDE 20 MG: 10 INJECTION, SOLUTION INTRAMUSCULAR; INTRAVENOUS at 10:02

## 2019-02-07 RX ADMIN — IPRATROPIUM BROMIDE AND ALBUTEROL SULFATE 3 ML: .5; 3 SOLUTION RESPIRATORY (INHALATION) at 08:02

## 2019-02-07 RX ADMIN — FAMOTIDINE 20 MG: 20 TABLET ORAL at 09:02

## 2019-02-07 RX ADMIN — SULFAMETHOXAZOLE AND TRIMETHOPRIM 2 TABLET: 800; 160 TABLET ORAL at 09:02

## 2019-02-07 RX ADMIN — FLUCONAZOLE 400 MG: 100 TABLET ORAL at 08:02

## 2019-02-07 RX ADMIN — SULFAMETHOXAZOLE AND TRIMETHOPRIM 2 TABLET: 800; 160 TABLET ORAL at 02:02

## 2019-02-07 NOTE — ASSESSMENT & PLAN NOTE
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 Will plan to start steroid taper tomorrow  Aspergillus antigen positive. I will discuss results with ID  Plan to initiate antiretroviral therapy as outpatient once further testing done. However, patient states he does not want to initiate antiretroviral therapy because it is toxic. I spent > 5 min discussing the dangers of not treating HIV/AIDS (in Panamanian). Verbalized understanding but still wants to antiretroviral therapy.   Patient is undocumented, and resources will be difficult.    consulted for community resources. ID recommends f/u at [facility] with Dr Shine where there is Panamanian speaking staff. I discussed this at [facility]

## 2019-02-07 NOTE — NURSING
Testing for Home O2    O2 Sats on RA at rest =92%    O2 sats on RA with exercise  =78%    O2 sats on 2 L O2 =96%

## 2019-02-07 NOTE — PHYSICIAN QUERY
PT Name: Lior Prado  MR #: [id number]    Physician Query Form - Respiratory Condition Clarification      CDS/: Lyn Hale               Contact information: [email or web address]    This form is a permanent document in the medical record.    Query Date: February 7, 2019    By submitting this query, we are merely seeking further clarification of documentation. Please utilize your independent clinical judgment when addressing the question(s) below.    The Medical record contains the following   Indicators   Supporting Clinical Findings Location in Medical Record   X   SOB, OSUNA, Wheezing, Productive Cough, Use of Accessory Muscles, etc. SOB for one week     H&P   X   Acute/Chronic Illness Acute hypoxemic respiratory failure     Likely PJP PNA  --Wean HFNC as tolerates  --Abx/steroids per ID, would switch to prednisone and PO Bactrim        PN 1/4      Radiology Findings     X   Respiratory Distress or Failure admitted with hypoxic respiratory distress PN 1/28   X   Hypoxia or Hypercapnia Pt in ICU for 02 requirements secondary to hypoxia     PN 2/2   X   RR         ABGs         O2 sat RR= 21- 33  O2 sats 86% - 93% VS flow sheet 1/29        BiPAP/Intubation     X   Supplemental O2 Nonrebreather VS flow sheet 1/28      Home O2, Oxygen Dependence        Treatment        Other       Respiratory failure can be acute, chronic or both. It is generally further specified as hypoxic, hypercapnic or both. Lastly, it is important to identify an etiology, if known or suspected.   References:: https://www.acphospitalist.org/archives/2013/10/coding; htm; http://"[employer].com/acute-respiratory-failure-know    The clinical guidelines noted below are only system guidelines, and do not replace the providers clinical judgment.    Provider, please specify diagnosis or diagnoses associated with above clinical findings.   [  x ] Acute Respiratory Failure with Hypoxia - ABG pO2 < 60 mmHg or O2 sat of 88% on RA and respiratory  symptoms documented   [   ] Acute Respiratory Distress - Generally describes less severe respiratory symptoms (tachypnea, in respiratory distress, increased work of breathing, unable to speak in complete sentences, labored breathing, use of accessory muscles, RR> 24, cyanosis, dyspnea, wheezing, stridor, lethargy) without sufficient measurements (pO2, SpO2, pH, and pCO2) to meet criteria for respiratory failure    [   ] Hypoxia Only   [   ] Other Respiratory Diagnosis (please specify): _please defer to attending physician Dr.Cruz Edmondson ________________________________   [   ]  Clinically Undetermined       Please document in your progress notes daily for the duration of treatment until resolved and include in your discharge summary.

## 2019-02-07 NOTE — PLAN OF CARE
Problem: Physical Therapy Goal  Goal: Physical Therapy Goal  Goals to be met by: 19     Patient will increase functional independence with mobility by performin. Supine to sit with [place]  2. Rolling to Left and Right with [place]  3. Sit to stand transfer with [place]  4. Bed to chair transfer with [place]   5. Gait  >250 feet with [place]   6. Upper/Lower extremity exercise program 3 sets x10 reps per handout, with independence     Outcome: Ongoing (interventions implemented as appropriate)  Pt ambulated ~500 ft x 2 trials with SBA using 2L O2 NC.

## 2019-02-07 NOTE — PT/OT/SLP PROGRESS
Physical Therapy Treatment    Patient Name:  Lior Prado   MRN:  [id number]    Recommendations:     Discharge Recommendations:  (outpatient PT services)   Discharge Equipment Recommendations: none   Barriers to discharge: Pt continued to require O2.      Assessment:     Lior Prado is a 43 y.o. male admitted with a medical diagnosis of AIDS.  He presents with the following impairments/functional limitations:  weakness, impaired cardiopulmonary response to activity, impaired endurance.    Rehab Prognosis: Good; patient would benefit from acute skilled PT services to address these deficits and reach maximum level of function.    Recent Surgery: Procedure(s) (LRB):  Bronchoscopy (N/A) 7 Days Post-Op    Plan:     During this hospitalization, patient to be seen 3 x/week(M-F) to address the identified rehab impairments via gait training, therapeutic activities, therapeutic exercises and progress toward the following goals:    · Plan of Care Expires:  02/18/19    Subjective     Chief Complaint: N/A  Patient/Family Comments/goals: Pt did not state.   Pain/Comfort:  · Pain Rating 1: 0/10      Objective:     Patient found in bed with all lines intact and call button in reach oxygen 2L, peripheral IV, telemetry upon PT entry to room.     General Precautions: Standard, fall, respiratory   Orthopedic Precautions:N/A   Braces: N/A     Functional Mobility:  · Bed Mobility:     · Scooting: independence  · Supine to Sit: modified independence with HOB elevated  · Transfers:     · Sit to Stand:  independence with no AD  · Gait: Pt ambulated ~500 ft x 2 trials with SBA using 2L O2 NC.  Pt with mild unsteadiness, 2 LOB however able to self correct.  Pt with decreased bebe.    · Balance: Pt with fair+ balance.      AM-PAC 6 CLICK MOBILITY  Turning over in bed (including adjusting bedclothes, sheets and blankets)?: 4  Sitting down on and standing up from a chair with arms (e.g., wheelchair, bedside commode, etc.):  4  Moving from lying on back to sitting on the side of the bed?: 4  Moving to and from a bed to a chair (including a wheelchair)?: 4  Need to walk in hospital room?: 3  Climbing 3-5 steps with a railing?: 3  Basic Mobility Total Score: 22       Therapeutic Activities and Exercises:  BUE seated therex 10 reps with red theraband: shoulder flex, chest pull, elbow flex, shoulder abduction, forward press    Pt issued written handout on HEP for UE therex.       Patient left seated EOB with all lines intact and call button in reach.    GOALS:   Multidisciplinary Problems     Physical Therapy Goals        Problem: Physical Therapy Goal    Goal Priority Disciplines Outcome Goal Variances Interventions   Physical Therapy Goal     PT, PT/OT Ongoing (interventions implemented as appropriate)     Description:  Goals to be met by: 19     Patient will increase functional independence with mobility by performin. Supine to sit with [place]  2. Rolling to Left and Right with [place]  3. Sit to stand transfer with [place]  4. Bed to chair transfer with [place]   5. Gait  >250 feet with [place]   6. Upper/Lower extremity exercise program 3 sets x10 reps per handout, with independence                      Time Tracking:     PT Received On: 19  PT Start Time: 922     PT Stop Time: 945  PT Total Time (min): 23 min     Billable Minutes: Gait Training 10 min and Therapeutic Exercise 13 min                   Lakisha Mcgregor, PT  2019

## 2019-02-07 NOTE — SUBJECTIVE & OBJECTIVE
Interval History: denies SOB even when sats dropped to high 70s at room air. Sats corrected with 2L low flow NC. Has no new complaints. Thrush resolved.     Review of Systems   Respiratory: Negative for shortness of breath.    Cardiovascular: Negative.    Gastrointestinal: Negative.      Objective:     Vital Signs (Most Recent):  Temp: 97.8 °F (36.6 °C) (02/07/19 0728)  Pulse: 110 (02/07/19 0800)  Resp: 18 (02/07/19 0800)  BP: 103/67 (02/07/19 0728)  SpO2: 97 % (02/07/19 0800) Vital Signs (24h Range):  Temp:  [97.8 °F (36.6 °C)-98.8 °F (37.1 °C)] 97.8 °F (36.6 °C)  Pulse:  [] 110  Resp:  [17-18] 18  SpO2:  [93 %-99 %] 97 %  BP: (103-113)/(63-77) 103/67     Weight: 52.9 kg (116 lb 10 oz)  Body mass index is 18.82 kg/m².    Intake/Output Summary (Last 24 hours) at 2/7/2019 1022  Last data filed at 2/7/2019 0912  Gross per 24 hour   Intake 360 ml   Output 2300 ml   Net -1940 ml      Physical Exam   Constitutional: He is oriented to person, place, and time. He appears well-developed. No distress.   Cachectic, Estonian-speaking only   HENT:   + temporal wasting   Eyes: No scleral icterus.   Cardiovascular: Normal rate, regular rhythm and normal heart sounds.   No murmur heard.  Pulmonary/Chest: Effort normal. No respiratory distress. He has no wheezes. He has no rhonchi. He exhibits no tenderness.   Faint rales throughout, less.    Abdominal: Soft. Bowel sounds are normal. There is no hepatosplenomegaly. There is no tenderness. There is no guarding.   Musculoskeletal: Normal range of motion. He exhibits no edema.   Neurological: He is alert and oriented to person, place, and time.   Skin: No rash noted. He is not diaphoretic. No erythema.   Nursing note and vitals reviewed.      Significant Labs: All pertinent labs within the past 24 hours have been reviewed.    Significant Imaging: I have reviewed all pertinent imaging results/findings within the past 24 hours.  I have reviewed and interpreted all pertinent imaging  results/findings within the past 24 hours.

## 2019-02-07 NOTE — ASSESSMENT & PLAN NOTE
Secondary to probable PJP  TB has been ruled out  Pulmonary following and patient is status post bronch  Extubated on 2/1 and patient tolerating weaning of oxygen  Clinically improving. Now on low flow. Responding to furosemide. Will give one more dose today

## 2019-02-07 NOTE — PLAN OF CARE
Problem: Adult Inpatient Plan of Care  Goal: Plan of Care Review  Outcome: Ongoing (interventions implemented as appropriate)   02/07/19 1308   Plan of Care Review   Plan of Care Reviewed With patient     Goal: Absence of Hospital-Acquired Illness or Injury    Intervention: Prevent VTE (venous thromboembolism)   02/07/19 1308   Prevent or Manage Embolism   VTE Prevention/Management ambulation promoted;bleeding precautions maintained;ROM (active) performed         Problem: Fall Injury Risk  Goal: Absence of Fall and Fall-Related Injury    Intervention: Promote Injury-Free Environment   02/07/19 1200 02/07/19 1308   Optimize [place] and Functional Mobility   Environmental Safety Modification --  clutter free environment maintained;lighting adjusted;room organization consistent   Optimize Balance and Safe Activity   Safety Promotion/Fall Prevention commode/urinal/bedpan at bedside;Fall Risk reviewed with patient/family;high risk medications identified;lighting adjusted;medications reviewed;nonskid shoes/socks when out of bed --          Problem: HIV/AIDS Infection  Goal: HIV/AIDS Symptom Control    Intervention: Support Life Quality   02/07/19 1308   Promote Anxiety Reduction   Supportive Measures active listening utilized;decision-making supported;self-care encouraged;verbalization of feelings encouraged         Problem: Skin Injury Risk Increased  Goal: Skin Health and Integrity    Intervention: Promote and Optimize Oral Intake   02/07/19 1308   Monitor and Manage Anemia   Oral Nutrition Promotion calorie dense foods provided;calorie dense liquids provided;rest periods promoted         Problem: Fluid Imbalance (Pneumonia)  Goal: Fluid Balance    Intervention: Monitor and Manage Fluid Balance   02/07/19 1308   Monitor and Manage Cardiac Rhythm Effects   Fluid/Electrolyte Management oral rehydration therapy initiated         Problem: Infection (Pneumonia)  Goal: Resolution of Infection  Signs/Symptoms    Intervention: Prevent Infection Progression   02/07/19 1308   Prevent or Manage Infection   Infection Management aseptic technique maintained         Problem: Respiratory Compromise (Pneumonia)  Goal: Effective Oxygenation and Ventilation    Intervention: Promote Airway Secretion Clearance   02/07/19 1308   Promote Airway Secretion Clearance   Cough And Deep Breathing done with encouragement     Intervention: Optimize Oxygenation and Ventilation   02/07/19 1308   Prevent Additional Skin Injury   Head of Bed (HOB) HOB elevated;HOB at 45 degrees   Support Asthma Symptom Control   Airway/Ventilation Management airway patency maintained;calming measures promoted

## 2019-02-07 NOTE — PROGRESS NOTES
"[facility] Medicine  Progress Note    Patient Name: Lior Prado  MRN: [id number]  Patient Class: IP- Inpatient   Admission Date: 1/28/2019  Length of Stay: 10 days  Attending Physician: Steph Guillen MD  Primary Care Provider: Primary Doctor No        Subjective:     Principal Problem:AIDS    HPI:  Mr. Donnelly is a 42 yo M with no reported past medical history- presents with a CC of weakness and some SOB for one week. He notes that he was treated with abx for a "pneumonia" 1/13-1/23.  He presents to the ED and is found to have thrush. Further, his HIV is +. The patient was started on treatment for PJP pneumonia and ID was consulted. The patient is thin and ill appearing.      Hospital Course:  Mr. Donnelly presented with shortness of breath and was admitted for treatment of PJP pneumonia and ID was consulted. The patient had a CTA of his chest that was negative for PE but was remarkable for a 1.8 cm irregular, cavitary opacity within the anterior segment of the right upper lobe, innumerable randomly distributed bilateral pulmonary nodules, patchy areas of subsegmental consolidation and nodular thickening of the right major and minor fissures, and mediastinal adenopathy.  Patient was started on empiric IV Bactrim and steroids.  He was transferred to the ICU due to high O2 requirements, rising lactate, and marginal blood pressures.  Pancytopenia was also present and worsened.  He was transfused 1 unit of platelets.  His blood pressures responded to IV fluid to alone and he did not require vasopressors. Pulmonology consulted for possible bronch in new diagnosis of HIV, and concern for PJP.  ID was also consulted.  On 1/30, CD4 count resulted at 11.  ID recommended bactrim and rocephin for probable PJP and CAP, respectively. Diflucan for oral candida. Pancytopenia likely due to sepsis, possible opportunistic infections and AIDS. Transaminases and lactate/LDH rising. On 1/31, " patient underwent bronchoscopy and remained on the ventilator postprocedure.  He was later extubated on 2/01 with use of BiPAP p.r.n. Rapid AFB stain resulted negative and was taken off isolation on 2/2.  His oxygen requirements continued to decline and liver function tests continue to improve.  He was stable for transfer to floor on 2/3. Oxygen requirements improving with intermittent doses of IV furosemide 20 mg for 3 days.     Interval History: denies SOB even when sats dropped to high 70s at room air. Sats corrected with 2L low flow NC. Has no new complaints. Thrush resolved.     Review of Systems   Respiratory: Negative for shortness of breath.    Cardiovascular: Negative.    Gastrointestinal: Negative.      Objective:     Vital Signs (Most Recent):  Temp: 97.8 °F (36.6 °C) (02/07/19 0728)  Pulse: 110 (02/07/19 0800)  Resp: 18 (02/07/19 0800)  BP: 103/67 (02/07/19 0728)  SpO2: 97 % (02/07/19 0800) Vital Signs (24h Range):  Temp:  [97.8 °F (36.6 °C)-98.8 °F (37.1 °C)] 97.8 °F (36.6 °C)  Pulse:  [] 110  Resp:  [17-18] 18  SpO2:  [93 %-99 %] 97 %  BP: (103-113)/(63-77) 103/67     Weight: 52.9 kg (116 lb 10 oz)  Body mass index is 18.82 kg/m².    Intake/Output Summary (Last 24 hours) at 2/7/2019 1022  Last data filed at 2/7/2019 0912  Gross per 24 hour   Intake 360 ml   Output 2300 ml   Net -1940 ml      Physical Exam   Constitutional: He is oriented to person, place, and time. He appears well-developed. No distress.   Cachectic, Mongolian-speaking only   HENT:   + temporal wasting   Eyes: No scleral icterus.   Cardiovascular: Normal rate, regular rhythm and normal heart sounds.   No murmur heard.  Pulmonary/Chest: Effort normal. No respiratory distress. He has no wheezes. He has no rhonchi. He exhibits no tenderness.   Faint rales throughout, less.    Abdominal: Soft. Bowel sounds are normal. There is no hepatosplenomegaly. There is no tenderness. There is no guarding.   Musculoskeletal: Normal range of  motion. He exhibits no edema.   Neurological: He is alert and oriented to person, place, and time.   Skin: No rash noted. He is not diaphoretic. No erythema.   Nursing note and vitals reviewed.      Significant Labs: All pertinent labs within the past 24 hours have been reviewed.    Significant Imaging: I have reviewed all pertinent imaging results/findings within the past 24 hours.  I have reviewed and interpreted all pertinent imaging results/findings within the past 24 hours.    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 Will plan to start steroid taper tomorrow  Aspergillus antigen positive. I will discuss results with ID  Plan to initiate antiretroviral therapy as outpatient once further testing done. However, patient states he does not want to initiate antiretroviral therapy because it is toxic. I spent > 5 min discussing the dangers of not treating HIV/AIDS (in [place]ut). Verbalized understanding but still wants to antiretroviral therapy.   Patient is undocumented, and resources will be difficult.    consulted for community resources. ID recommends f/u at [facility] with Dr Shine where there is Yakut speaking staff. I discussed this at [facility]     Acute hypoxemic respiratory failure    Secondary to probable PJP  TB has been ruled out  Pulmonary following and patient is status post bronch  Extubated on 2/1 and patient tolerating weaning of oxygen  Clinically improving.  Now on low flow. Responding to furosemide. Will give one more dose today     Pneumonia, unspecified organism    Presumed PJP insetting of AIDS  Rocephin discontinued by ID  Bactrim now transitioned to PO. On prednisone. Will need taper  Weaning oxygen as tolerated             Pancytopenia    Secondary to AIDS and/or medication induced  Thrombocytopenia 12k s/p transfusion of 1 unit platelets prior to bronch  Appreciate Heme-Onc input  Platelets overall stable  Will continue to monitor     Elevated LFTs    Likely due to sepsis and/or AIDS  Hepatitis panel negative  No abdominal pain and bilirubin not suggesting obstructive pattern or HIV cholangiopathy   U/S with fatty liver   LFTs are improving, will continue to monitor         Malnutrition of moderate degree    Pt did not comment on changes in diet  Due to AIDS  Will supplement nutrition with boost glucose control     Tobacco abuse    Smoking cessation education with > 3 min of counseling  Nicotine patch offered        Hyponatremia    Improved with IVF  Will continue monitor.     Weakness    Much better. Independent       Thrush    Thrush no longer present  Will continue fluconazole x 2 more days       VTE Risk Mitigation (From admission, onward)        Ordered     IP VTE LOW RISK PATIENT  Once      01/28/19 1927     Place DENY hose  Until discontinued      01/28/19 1927              Steph Edmondson MD  Department of Hospital Medicine   [facility]

## 2019-02-08 LAB
ALBUMIN SERPL BCP-MCNC: 2.7 G/DL
ALP SERPL-CCNC: 268 U/L
ALT SERPL W/O P-5'-P-CCNC: 70 U/L
ANION GAP SERPL CALC-SCNC: 10 MMOL/L
ANISOCYTOSIS BLD QL SMEAR: ABNORMAL
ASPERGILLUS AB SER QL ID: ABNORMAL
AST SERPL-CCNC: 19 U/L
B DERMAT AB SER QL ID: ABNORMAL
BASOPHILS # BLD AUTO: 0.02 K/UL
BASOPHILS NFR BLD: 0.3 %
BILIRUB SERPL-MCNC: 0.3 MG/DL
BUN SERPL-MCNC: 24 MG/DL
C IMMITIS AB SER QL ID: ABNORMAL
CALCIUM SERPL-MCNC: 8.8 MG/DL
CHLORIDE SERPL-SCNC: 95 MMOL/L
CO2 SERPL-SCNC: 25 MMOL/L
CREAT SERPL-MCNC: 0.7 MG/DL
DIFFERENTIAL METHOD: ABNORMAL
EOSINOPHIL # BLD AUTO: 0 K/UL
EOSINOPHIL NFR BLD: 0 %
ERYTHROCYTE [DISTWIDTH] IN BLOOD BY AUTOMATED COUNT: 19.5 %
EST. GFR  (AFRICAN AMERICAN): >60 ML/MIN/1.73 M^2
EST. GFR  (NON AFRICAN AMERICAN): >60 ML/MIN/1.73 M^2
GLUCOSE SERPL-MCNC: 144 MG/DL
H CAPSUL AB SER QL ID: DETECTED
HCT VFR BLD AUTO: 34.4 %
HGB BLD-MCNC: 11.1 G/DL
HYPOCHROMIA BLD QL SMEAR: ABNORMAL
LYMPHOCYTES # BLD AUTO: 0.3 K/UL
LYMPHOCYTES NFR BLD: 3.6 %
MCH RBC QN AUTO: 25.2 PG
MCHC RBC AUTO-ENTMCNC: 32.3 G/DL
MCV RBC AUTO: 78 FL
MONOCYTES # BLD AUTO: 0.4 K/UL
MONOCYTES NFR BLD: 5.2 %
NEUTROPHILS # BLD AUTO: 6 K/UL
NEUTROPHILS NFR BLD: 90.9 %
OVALOCYTES BLD QL SMEAR: ABNORMAL
PLATELET # BLD AUTO: 67 K/UL
PLATELET BLD QL SMEAR: ABNORMAL
PMV BLD AUTO: ABNORMAL FL
POIKILOCYTOSIS BLD QL SMEAR: SLIGHT
POLYCHROMASIA BLD QL SMEAR: ABNORMAL
POTASSIUM SERPL-SCNC: 5.2 MMOL/L
PROT SERPL-MCNC: 7.2 G/DL
RBC # BLD AUTO: 4.41 M/UL
SODIUM SERPL-SCNC: 130 MMOL/L
TOXIC GRANULES BLD QL SMEAR: PRESENT
WBC # BLD AUTO: 6.88 K/UL

## 2019-02-08 PROCEDURE — 25000003 PHARM REV CODE 250: Performed by: EMERGENCY MEDICINE

## 2019-02-08 PROCEDURE — 99233 PR SUBSEQUENT HOSPITAL CARE,LEVL III: ICD-10-PCS | Mod: ,,, | Performed by: INTERNAL MEDICINE

## 2019-02-08 PROCEDURE — 85025 COMPLETE CBC W/AUTO DIFF WBC: CPT

## 2019-02-08 PROCEDURE — 21400001 HC TELEMETRY ROOM

## 2019-02-08 PROCEDURE — 94640 AIRWAY INHALATION TREATMENT: CPT

## 2019-02-08 PROCEDURE — 99233 SBSQ HOSP IP/OBS HIGH 50: CPT | Mod: ,,, | Performed by: INTERNAL MEDICINE

## 2019-02-08 PROCEDURE — 25000003 PHARM REV CODE 250: Performed by: PHYSICIAN ASSISTANT

## 2019-02-08 PROCEDURE — 25000003 PHARM REV CODE 250: Performed by: INTERNAL MEDICINE

## 2019-02-08 PROCEDURE — 25000242 PHARM REV CODE 250 ALT 637 W/ HCPCS: Performed by: NURSE PRACTITIONER

## 2019-02-08 PROCEDURE — 36415 COLL VENOUS BLD VENIPUNCTURE: CPT

## 2019-02-08 PROCEDURE — 94761 N-INVAS EAR/PLS OXIMETRY MLT: CPT

## 2019-02-08 PROCEDURE — 63600175 PHARM REV CODE 636 W HCPCS: Performed by: INTERNAL MEDICINE

## 2019-02-08 PROCEDURE — 27000221 HC OXYGEN, UP TO 24 HOURS

## 2019-02-08 PROCEDURE — 80053 COMPREHEN METABOLIC PANEL: CPT

## 2019-02-08 RX ORDER — ITRACONAZOLE 100 MG/1
200 CAPSULE ORAL 3 TIMES DAILY
Status: COMPLETED | OUTPATIENT
Start: 2019-02-08 | End: 2019-02-11

## 2019-02-08 RX ADMIN — IPRATROPIUM BROMIDE AND ALBUTEROL SULFATE 3 ML: .5; 3 SOLUTION RESPIRATORY (INHALATION) at 09:02

## 2019-02-08 RX ADMIN — FLUCONAZOLE 400 MG: 100 TABLET ORAL at 09:02

## 2019-02-08 RX ADMIN — FAMOTIDINE 20 MG: 20 TABLET ORAL at 09:02

## 2019-02-08 RX ADMIN — IPRATROPIUM BROMIDE AND ALBUTEROL SULFATE 3 ML: .5; 3 SOLUTION RESPIRATORY (INHALATION) at 03:02

## 2019-02-08 RX ADMIN — IPRATROPIUM BROMIDE AND ALBUTEROL SULFATE 3 ML: .5; 3 SOLUTION RESPIRATORY (INHALATION) at 08:02

## 2019-02-08 RX ADMIN — ITRACONAZOLE 200 MG: 100 CAPSULE ORAL at 08:02

## 2019-02-08 RX ADMIN — SULFAMETHOXAZOLE AND TRIMETHOPRIM 2 TABLET: 800; 160 TABLET ORAL at 04:02

## 2019-02-08 RX ADMIN — PREDNISONE 40 MG: 20 TABLET ORAL at 09:02

## 2019-02-08 RX ADMIN — FAMOTIDINE 20 MG: 20 TABLET ORAL at 08:02

## 2019-02-08 RX ADMIN — ITRACONAZOLE 200 MG: 100 CAPSULE ORAL at 04:02

## 2019-02-08 RX ADMIN — SULFAMETHOXAZOLE AND TRIMETHOPRIM 2 TABLET: 800; 160 TABLET ORAL at 09:02

## 2019-02-08 RX ADMIN — IPRATROPIUM BROMIDE AND ALBUTEROL SULFATE 3 ML: .5; 3 SOLUTION RESPIRATORY (INHALATION) at 11:02

## 2019-02-08 RX ADMIN — SULFAMETHOXAZOLE AND TRIMETHOPRIM 2 TABLET: 800; 160 TABLET ORAL at 08:02

## 2019-02-08 NOTE — PROGRESS NOTES
"[facility] Hospital Medicine  Progress Note    Patient Name: Lior Prado  MRN: [id number]  Patient Class: IP- Inpatient   Admission Date: 1/28/2019  Length of Stay: 11 days  Attending Physician: Steph Guillen MD  Primary Care Provider: Primary Doctor No        Subjective:     Principal Problem:AIDS    HPI:  Mr. Donnelly is a 44 yo M with no reported past medical history- presents with a CC of weakness and some SOB for one week. He notes that he was treated with abx for a "pneumonia" 1/13-1/23.  He presents to the ED and is found to have thrush. Further, his HIV is +. The patient was started on treatment for PJP pneumonia and ID was consulted. The patient is thin and ill appearing.      Hospital Course:  Mr. Donnelly presented with shortness of breath and was admitted for treatment of PJP pneumonia and ID was consulted. The patient had a CTA of his chest that was negative for PE but was remarkable for a 1.8 cm irregular, cavitary opacity within the anterior segment of the right upper lobe, innumerable randomly distributed bilateral pulmonary nodules, patchy areas of subsegmental consolidation and nodular thickening of the right major and minor fissures, and mediastinal adenopathy.  Patient was started on empiric IV Bactrim and steroids.  He was transferred to the ICU due to high O2 requirements, rising lactate, and marginal blood pressures.  Pancytopenia was also present and worsened.  He was transfused 1 unit of platelets.  His blood pressures responded to IV fluid to alone and he did not require vasopressors. Pulmonology consulted for possible bronch in new diagnosis of HIV, and concern for PJP.  ID was also consulted.  On 1/30, CD4 count resulted at 11.  ID recommended bactrim and rocephin for probable PJP and CAP, respectively. Diflucan for oral candida. Pancytopenia likely due to sepsis, possible opportunistic infections and AIDS. Transaminases and lactate/LDH rising. On 1/31, " patient underwent bronchoscopy and remained on the ventilator postprocedure.  He was later extubated on 2/01 with use of BiPAP p.r.n. Rapid AFB stain resulted negative and was taken off isolation on 2/2.  His oxygen requirements continued to decline and liver function tests continue to improve.  He was stable for transfer to floor on 2/3. Oxygen requirements improving with intermittent doses of IV furosemide 20 mg for 3 days.     Interval History: tachycardic but with stable sats at room air. Aspergillus Ag and Histoplasma positive. Patient feels well and has no complaints.     Review of Systems   Respiratory: Negative for shortness of breath.    Cardiovascular: Negative.    Gastrointestinal: Negative.      Objective:     Vital Signs (Most Recent):  Temp: 97.7 °F (36.5 °C) (02/08/19 1511)  Pulse: 108 (02/08/19 1511)  Resp: 18 (02/08/19 1511)  BP: 109/70 (02/08/19 1511)  SpO2: 99 % (02/08/19 1511) Vital Signs (24h Range):  Temp:  [97.6 °F (36.4 °C)-98.2 °F (36.8 °C)] 97.7 °F (36.5 °C)  Pulse:  [] 108  Resp:  [18-20] 18  SpO2:  [94 %-99 %] 99 %  BP: ()/(66-76) 109/70     Weight: 52.9 kg (116 lb 10 oz)  Body mass index is 18.82 kg/m².    Intake/Output Summary (Last 24 hours) at 2/8/2019 1549  Last data filed at 2/8/2019 0900  Gross per 24 hour   Intake 1080 ml   Output 450 ml   Net 630 ml      Physical Exam   Constitutional: He is oriented to person, place, and time. He appears well-developed. No distress.   Cachectic, German-speaking only   HENT:   + temporal wasting   Eyes: No scleral icterus.   Cardiovascular: Regular rhythm and normal heart sounds.   No murmur heard.  tachycardia   Pulmonary/Chest: Effort normal and breath sounds normal. No respiratory distress. He has no wheezes. He has no rhonchi. He exhibits no tenderness.   Abdominal: Soft. Bowel sounds are normal. There is no hepatosplenomegaly. There is no tenderness. There is no guarding.   Musculoskeletal: Normal range of motion. He exhibits no  edema.   Neurological: He is alert and oriented to person, place, and time.   Skin: No rash noted. He is not diaphoretic. No erythema.   Nursing note and vitals reviewed.      Significant Labs: All pertinent labs within the past 24 hours have been reviewed.    Significant Imaging: I have reviewed all pertinent imaging results/findings within the past 24 hours.  I have reviewed and interpreted all pertinent imaging results/findings within the past 24 hours.    Assessment/Plan:      * AIDS    Pt was unaware of virus and does not want information to be shared with anyone  He reported he has a wife in [place] who is healthy   Pt reportedly denied intercourse x 7 years, no IV drug use, no incarceration, no blood transfusions, professional tattoos with clean needles to his knowledge, no visits to [place] in 7 years, no other sick contacts   CD4 count is 11, and with opportunistic infection consistent with AIDS  Patient currently on treatment for PJP pneumonia with bactrim (x 21 days , then maintenance until follow up) and steroids. However, histoplasma workup positive therefore there is a question about this being histo rather than PJP. Itrconazole added to treatment pending finalized results to r/o PJP.  Aspergillus antigen positive. Cross reactive? Workup in progress  Plan to initiate antiretroviral therapy as outpatient once further testing done. However, patient states he does not want to initiate antiretroviral therapy because it is toxic. I spent > 5 min discussing the dangers of not treating HIV/AIDS (in Greek). Verbalized understanding but still wants to antiretroviral therapy.   Patient is undocumented, and resources will be difficult.    consulted for community resources. ID recommends f/u at [facility] with Dr Shine where there is Greek speaking staff. I discussed this at huddle    Of note, patient now states he will stay in [place] and is motivated to f/u at [facility]     Acute  hypoxemic respiratory failure    Secondary to probable PJP  TB has been ruled out  Pulmonary following and patient is status post bronch  Extubated on 2/1  Stable at room air     Pneumonia, unspecified organism    Presumed PJP insetting of AIDS  Rocephin discontinued by ID  Bactrim now transitioned to PO. On prednisone. Will need taper  Weaning oxygen as tolerated             Pancytopenia    Secondary to AIDS and/or medication induced  Thrombocytopenia 12k s/p transfusion of 1 unit platelets prior to bronch  Appreciate Heme-Onc input  Platelets overall stable  Will continue to monitor     Elevated LFTs    Likely due to sepsis and/or AIDS  Hepatitis panel negative  No abdominal pain and bilirubin not suggesting obstructive pattern or HIV cholangiopathy   U/S with fatty liver   LFTs are improving, will continue to monitor         Malnutrition of moderate degree    Pt did not comment on changes in diet  Due to AIDS  Will supplement nutrition with boost glucose control     Tobacco abuse    Smoking cessation education with > 3 min of counseling  Nicotine patch offered        Hyponatremia    Improved with IVF  Will continue monitor.     Weakness    Much better. Independent       Thrush    Thrush no longer present  Fluconazole discontinued       VTE Risk Mitigation (From admission, onward)        Ordered     IP VTE LOW RISK PATIENT  Once      01/28/19 1927     Place DENY hose  Until discontinued      01/28/19 1927              Steph Edmondson MD  Department of Hospital Medicine   [facility]

## 2019-02-08 NOTE — PROGRESS NOTES
"[facility]  Infectious Disease  Progress Note    Patient Name: Lior Prado  MRN: [id number]  Admission Date: 1/28/2019  Length of Stay: 11 days  Attending Physician: Steph Guillen MD  Primary Care Provider: Primary Doctor No    Isolation Status: No active isolations  Assessment/Plan:      * AIDS    44 y/o Cymro speaking male with no significant medical history presents with progressively worsening SOB, cough, weakness and weight loss. Denied hemoptysis or sputum production. He was found to be positive for HIV with CD4 count of 11. ID work up initiated:    Blood cultures 1/28 NGTD  CXR with concerns for PCP pneumonia; CTA shows 1.8 cm irregular, cavitary opacity within the anterior segment of the right upper lobe  Pt now S/p bronchoscopy; AFB cultures NGTD, cytology not sent??  Fungitell positive  TB PCR NGTD; quant gold indeterminant; repeated yesterday  Crypto negative  c diff negative; stool culture negative  RPR/toxo negative  Repeat Aspergillus antigen positive, however, there can be cross reaction  Urine histo positive  Awaiting fungal immunodiffusion    Dr. Poon called pathology to ask to run silver stain on cytology to r/o PCP. Now concern for pulmonary histoplasmosis.    Plan:  1. Continue bactrim  DS 2 tablets PO TID for now; if stain negative for PCP, will d/c  2. Hold off on MAC prophylaxis for now but will need to start as outpatient given CD4 of 11  3. D/c fluconazole and start itraconazole for histo- 200 mg PO TID x 3 days as loading dose; check level on Monday then re-dose   4. Continue prednisone for now  5. Aspergillus antigen repeated and positive- may be cross reacting; will hold off on therapy for now  6. Will hold off on starting antiviral therapy for now as starting medication can cause risk of IRIS and pt not currently wanting to start HIV meds; pt will need f/u as outpatient; per Dr. Escalera "Dr Shine at [facility] is Cymro speaking and she may be a " good provider for this patient to see outpatient  7. Would hold off on discharge pending further studies         Thank you for your consult. I will follow-up with patient. Please contact us if you have any additional questions.  ANN Marc Pager: [phone]    Infectious Disease  [facility]    Subjective:     Principal Problem:AIDS    HPI: 42 y/o Georgian speaking male with no significant medical history presents with progressively worsening SOB and weakness for the past week. Associated symptoms include dizziness. He reports having a cough (mostly at nights) for the past week. Denies hemoptysis or sputum production. He denies having any fever chills or night sweats at home. No n/v/d,dysuria. He reports having dyspnea on exertion and 6kg weight loss. On admission he was found to be febrile and significantly diaphoretic. His HIV rapid test is positive. Concerns for PJP pneumonia. He is thin and ill appearing.    He works as a . He denies incarceration or homelessness. He denies knowing about HIV diagnosis. He smokes, denies IVDU or illicit drug use. He denies exposure to TB. No significant allergies at this time.     Blood cultures NGTD  He is febrile 101 and tachycardic  Interval History: no longer on oxygen. Overall improving.    Review of Systems   Constitutional: Negative for chills and fever.   Respiratory: Positive for cough and shortness of breath.    Gastrointestinal: Negative for diarrhea, nausea and vomiting.   Skin: Negative for wound.   Neurological: Positive for weakness.     Objective:     Vital Signs (Most Recent):  Temp: 97.8 °F (36.6 °C) (02/08/19 1102)  Pulse: 87 (02/08/19 1155)  Resp: 18 (02/08/19 1155)  BP: 99/66 (02/08/19 1102)  SpO2: 96 % (02/08/19 1155) Vital Signs (24h Range):  Temp:  [97.6 °F (36.4 °C)-98.6 °F (37 °C)] 97.8 °F (36.6 °C)  Pulse:  [] 87  Resp:  [18-20] 18  SpO2:  [94 %-97 %] 96 %  BP: ()/(66-76) 99/66     Weight: 52.9 kg  (116 lb 10 oz)  Body mass index is 18.82 kg/m².    Estimated Creatinine Clearance: 101.8 mL/min (based on SCr of 0.7 mg/dL).    Physical Exam   Constitutional: He appears cachectic. No distress.   Thin, ill appearing   HENT:   Head: Normocephalic.   Cardiovascular: Regular rhythm and normal heart sounds. Exam reveals no friction rub.   No murmur heard.  Pulmonary/Chest: No stridor. No respiratory distress. He has no wheezes.   Abdominal: Soft. He exhibits no distension. There is no tenderness. There is no guarding.   Musculoskeletal: He exhibits no tenderness.   Skin: Skin is warm. He is not diaphoretic. No erythema. No pallor.   Psychiatric: He has a normal mood and affect.   Vitals reviewed.      Significant Labs:   Blood Culture:   Recent Labs   Lab 01/28/19  1023 01/28/19  1028   LABBLOO No growth after 5 days. No growth after 5 days.     CBC:   Recent Labs   Lab 02/07/19  0732 02/08/19  0546   WBC 6.24 6.88   HGB 11.1* 11.1*   HCT 34.1* 34.4*   PLT 45* 67*     CMP:   Recent Labs   Lab 02/07/19  0732 02/08/19  0546   * 130*   K 4.7 5.2*   CL 95 95   CO2 27 25   GLU 90 144*   BUN 24* 24*   CREATININE 0.7 0.7   CALCIUM 8.9 8.8   PROT 6.8 7.2   ALBUMIN 2.5* 2.7*   BILITOT 0.4 0.3   ALKPHOS 287* 268*   AST 22 19   ALT 79* 70*   ANIONGAP 7* 10   EGFRNONAA >60 >60     Respiratory Culture:   Recent Labs   Lab 01/31/19  1035   GSRESP <10 epithelial cells per low power field.  Many WBC's  Many budding yeast   RESPIRATORYC No growth       Significant Imaging: I have reviewed all pertinent imaging results/findings within the past 24 hours.

## 2019-02-08 NOTE — SUBJECTIVE & OBJECTIVE
Interval History: no longer on oxygen. Overall improving.    Review of Systems   Constitutional: Negative for chills and fever.   Respiratory: Positive for cough and shortness of breath.    Gastrointestinal: Negative for diarrhea, nausea and vomiting.   Skin: Negative for wound.   Neurological: Positive for weakness.     Objective:     Vital Signs (Most Recent):  Temp: 97.8 °F (36.6 °C) (02/08/19 1102)  Pulse: 87 (02/08/19 1155)  Resp: 18 (02/08/19 1155)  BP: 99/66 (02/08/19 1102)  SpO2: 96 % (02/08/19 1155) Vital Signs (24h Range):  Temp:  [97.6 °F (36.4 °C)-98.6 °F (37 °C)] 97.8 °F (36.6 °C)  Pulse:  [] 87  Resp:  [18-20] 18  SpO2:  [94 %-97 %] 96 %  BP: ()/(66-76) 99/66     Weight: 52.9 kg (116 lb 10 oz)  Body mass index is 18.82 kg/m².    Estimated Creatinine Clearance: 101.8 mL/min (based on SCr of 0.7 mg/dL).    Physical Exam   Constitutional: He appears cachectic. No distress.   Thin, ill appearing   HENT:   Head: Normocephalic.   Cardiovascular: Regular rhythm and normal heart sounds. Exam reveals no friction rub.   No murmur heard.  Pulmonary/Chest: No stridor. No respiratory distress. He has no wheezes.   Abdominal: Soft. He exhibits no distension. There is no tenderness. There is no guarding.   Musculoskeletal: He exhibits no tenderness.   Skin: Skin is warm. He is not diaphoretic. No erythema. No pallor.   Psychiatric: He has a normal mood and affect.   Vitals reviewed.      Significant Labs:   Blood Culture:   Recent Labs   Lab 01/28/19  1023 01/28/19  1028   LABBLOO No growth after 5 days. No growth after 5 days.     CBC:   Recent Labs   Lab 02/07/19  0732 02/08/19  0546   WBC 6.24 6.88   HGB 11.1* 11.1*   HCT 34.1* 34.4*   PLT 45* 67*     CMP:   Recent Labs   Lab 02/07/19  0732 02/08/19  0546   * 130*   K 4.7 5.2*   CL 95 95   CO2 27 25   GLU 90 144*   BUN 24* 24*   CREATININE 0.7 0.7   CALCIUM 8.9 8.8   PROT 6.8 7.2   ALBUMIN 2.5* 2.7*   BILITOT 0.4 0.3   ALKPHOS 287* 268*   AST 22  19   ALT 79* 70*   ANIONGAP 7* 10   EGFRNONAA >60 >60     Respiratory Culture:   Recent Labs   Lab 01/31/19  1035   GSRESP <10 epithelial cells per low power field.  Many WBC's  Many budding yeast   RESPIRATORYC No growth       Significant Imaging: I have reviewed all pertinent imaging results/findings within the past 24 hours.

## 2019-02-08 NOTE — ASSESSMENT & PLAN NOTE
Secondary to probable PJP  TB has been ruled out  Pulmonary following and patient is status post bronch  Extubated on 2/1  Stable at room air

## 2019-02-08 NOTE — SUBJECTIVE & OBJECTIVE
Interval History: tachycardic but with stable sats at room air. Aspergillus Ag and Histoplasma positive. Patient feels well and has no complaints.     Review of Systems   Respiratory: Negative for shortness of breath.    Cardiovascular: Negative.    Gastrointestinal: Negative.      Objective:     Vital Signs (Most Recent):  Temp: 97.7 °F (36.5 °C) (02/08/19 1511)  Pulse: 108 (02/08/19 1511)  Resp: 18 (02/08/19 1511)  BP: 109/70 (02/08/19 1511)  SpO2: 99 % (02/08/19 1511) Vital Signs (24h Range):  Temp:  [97.6 °F (36.4 °C)-98.2 °F (36.8 °C)] 97.7 °F (36.5 °C)  Pulse:  [] 108  Resp:  [18-20] 18  SpO2:  [94 %-99 %] 99 %  BP: ()/(66-76) 109/70     Weight: 52.9 kg (116 lb 10 oz)  Body mass index is 18.82 kg/m².    Intake/Output Summary (Last 24 hours) at 2/8/2019 1549  Last data filed at 2/8/2019 0900  Gross per 24 hour   Intake 1080 ml   Output 450 ml   Net 630 ml      Physical Exam   Constitutional: He is oriented to person, place, and time. He appears well-developed. No distress.   Cachectic, Guatemalan-speaking only   HENT:   + temporal wasting   Eyes: No scleral icterus.   Cardiovascular: Regular rhythm and normal heart sounds.   No murmur heard.  tachycardia   Pulmonary/Chest: Effort normal and breath sounds normal. No respiratory distress. He has no wheezes. He has no rhonchi. He exhibits no tenderness.   Abdominal: Soft. Bowel sounds are normal. There is no hepatosplenomegaly. There is no tenderness. There is no guarding.   Musculoskeletal: Normal range of motion. He exhibits no edema.   Neurological: He is alert and oriented to person, place, and time.   Skin: No rash noted. He is not diaphoretic. No erythema.   Nursing note and vitals reviewed.      Significant Labs: All pertinent labs within the past 24 hours have been reviewed.    Significant Imaging: I have reviewed all pertinent imaging results/findings within the past 24 hours.  I have reviewed and interpreted all pertinent imaging  results/findings within the past 24 hours.

## 2019-02-08 NOTE — ASSESSMENT & PLAN NOTE
"44 y/o Argentine speaking male with no significant medical history presents with progressively worsening SOB, cough, weakness and weight loss. Denied hemoptysis or sputum production. He was found to be positive for HIV with CD4 count of 11. ID work up initiated:    Blood cultures 1/28 NGTD  CXR with concerns for PCP pneumonia; CTA shows 1.8 cm irregular, cavitary opacity within the anterior segment of the right upper lobe  Pt now S/p bronchoscopy; AFB cultures NGTD, cytology not sent??  Fungitell positive  TB PCR NGTD; quant gold indeterminant; repeated yesterday  Crypto negative  c diff negative; stool culture negative  RPR/toxo negative  Repeat Aspergillus antigen positive, however, there can be cross reaction  Urine histo positive  Awaiting fungal immunodiffusion    Dr. Poon called pathology to ask to run silver stain on cytology to r/o PCP. Now concern for pulmonary histoplasmosis.    Plan:  1. Continue bactrim  DS 2 tablets PO TID for now; if stain negative for PCP, will d/c  2. Hold off on MAC prophylaxis for now but will need to start as outpatient given CD4 of 11  3. D/c fluconazole and start itraconazole for histo- 200 mg PO TID x 3 days as loading dose; check level on Monday then re-dose   4. Continue prednisone for now  5. Aspergillus antigen repeated and positive- may be cross reacting; will hold off on therapy for now  6. Will hold off on starting antiviral therapy for now as starting medication can cause risk of IRIS and pt not currently wanting to start HIV meds; pt will need f/u as outpatient; per Dr. Escalera "Dr Shine at [facility] is Argentine speaking and she may be a good provider for this patient to see outpatient  7. Will follow    "

## 2019-02-08 NOTE — NURSING
O2 Sats on RA at rest =96%    O2 sats on RA with exercise  =94%    O2 sats on __L O2 with exercise =N/A

## 2019-02-08 NOTE — PLAN OF CARE
Problem: Adult Inpatient Plan of Care  Goal: Plan of Care Review  Outcome: Ongoing (interventions implemented as appropriate)   02/08/19 1215   Plan of Care Review   Plan of Care Reviewed With patient       Problem: HIV/AIDS Infection  Goal: HIV/AIDS Symptom Control    Intervention: Support Life Quality   02/08/19 1215   Promote Anxiety Reduction   Supportive Measures active listening utilized;counseling provided;decision-making supported;goal setting facilitated;positive reinforcement provided;relaxation techniques promoted;self-care encouraged;verbalization of feelings encouraged         Problem: Skin Injury Risk Increased  Goal: Skin Health and Integrity    Intervention: Promote and Optimize Oral Intake   02/08/19 1215   Monitor and Manage Anemia   Oral Nutrition Promotion calorie dense foods provided;calorie dense liquids provided;physical activity promoted         Problem: Fluid Imbalance (Pneumonia)  Goal: Fluid Balance    Intervention: Monitor and Manage Fluid Balance   02/08/19 1215   Monitor and Manage Cardiac Rhythm Effects   Fluid/Electrolyte Management oral rehydration therapy initiated         Problem: Respiratory Compromise (Pneumonia)  Goal: Effective Oxygenation and Ventilation    Intervention: Promote Airway Secretion Clearance   02/08/19 1215   Promote Airway Secretion Clearance   Cough And Deep Breathing done independently per patient     Intervention: Optimize Oxygenation and Ventilation   02/08/19 1215   Prevent Additional Skin Injury   Head of Bed (HOB) HOB at 30-45 degrees   Support Asthma Symptom Control   Airway/Ventilation Management airway patency maintained

## 2019-02-08 NOTE — ASSESSMENT & PLAN NOTE
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 However, histoplasma workup positive therefore there is a question about this being histo rather than PJP. Itrconazole added to treatment pending finalized results to r/o PJP.  Aspergillus antigen positive. Cross reactive? Workup in progress  Plan to initiate antiretroviral therapy as outpatient once further testing done. However, patient states he does not want to initiate antiretroviral therapy because it is toxic. I spent > 5 min discussing the dangers of not treating HIV/AIDS (in Scottish). Verbalized understanding but still wants to antiretroviral therapy.   Patient is undocumented, and resources will be difficult.    consulted for community resources. ID recommends f/u at [facility] with Dr Shine where there is Scottish speaking staff. I discussed this at [facility]    Of note, patient now states he will stay in [place] and is motivated to f/u at [facility]

## 2019-02-09 LAB
ALBUMIN SERPL BCP-MCNC: 2.7 G/DL
ALP SERPL-CCNC: 262 U/L
ALT SERPL W/O P-5'-P-CCNC: 82 U/L
ANION GAP SERPL CALC-SCNC: 8 MMOL/L
ANISOCYTOSIS BLD QL SMEAR: ABNORMAL
AST SERPL-CCNC: 27 U/L
BASOPHILS # BLD AUTO: 0.01 K/UL
BASOPHILS NFR BLD: 0.1 %
BILIRUB SERPL-MCNC: 0.4 MG/DL
BUN SERPL-MCNC: 24 MG/DL
CALCIUM SERPL-MCNC: 8.8 MG/DL
CHLORIDE SERPL-SCNC: 93 MMOL/L
CO2 SERPL-SCNC: 23 MMOL/L
CREAT SERPL-MCNC: 0.7 MG/DL
DIFFERENTIAL METHOD: ABNORMAL
EOSINOPHIL # BLD AUTO: 0 K/UL
EOSINOPHIL NFR BLD: 0 %
ERYTHROCYTE [DISTWIDTH] IN BLOOD BY AUTOMATED COUNT: 19.2 %
EST. GFR  (AFRICAN AMERICAN): >60 ML/MIN/1.73 M^2
EST. GFR  (NON AFRICAN AMERICAN): >60 ML/MIN/1.73 M^2
GIANT PLATELETS BLD QL SMEAR: PRESENT
GLUCOSE SERPL-MCNC: 87 MG/DL
HCT VFR BLD AUTO: 34.3 %
HGB BLD-MCNC: 11 G/DL
LYMPHOCYTES # BLD AUTO: 0.5 K/UL
LYMPHOCYTES NFR BLD: 6.4 %
MCH RBC QN AUTO: 25.4 PG
MCHC RBC AUTO-ENTMCNC: 32.1 G/DL
MCV RBC AUTO: 79 FL
MONOCYTES # BLD AUTO: 0.3 K/UL
MONOCYTES NFR BLD: 4.7 %
NEUTROPHILS # BLD AUTO: 6.5 K/UL
NEUTROPHILS NFR BLD: 91 %
OVALOCYTES BLD QL SMEAR: ABNORMAL
PLATELET # BLD AUTO: 61 K/UL
PLATELET BLD QL SMEAR: ABNORMAL
PMV BLD AUTO: ABNORMAL FL
POTASSIUM SERPL-SCNC: 5.2 MMOL/L
PROT SERPL-MCNC: 6.9 G/DL
RBC # BLD AUTO: 4.33 M/UL
SODIUM SERPL-SCNC: 124 MMOL/L
TOXIC GRANULES BLD QL SMEAR: PRESENT
WBC # BLD AUTO: 7.3 K/UL

## 2019-02-09 PROCEDURE — 85025 COMPLETE CBC W/AUTO DIFF WBC: CPT

## 2019-02-09 PROCEDURE — 94761 N-INVAS EAR/PLS OXIMETRY MLT: CPT

## 2019-02-09 PROCEDURE — 63600175 PHARM REV CODE 636 W HCPCS: Performed by: INTERNAL MEDICINE

## 2019-02-09 PROCEDURE — 27000221 HC OXYGEN, UP TO 24 HOURS

## 2019-02-09 PROCEDURE — 25000003 PHARM REV CODE 250: Performed by: PHYSICIAN ASSISTANT

## 2019-02-09 PROCEDURE — 25000003 PHARM REV CODE 250: Performed by: EMERGENCY MEDICINE

## 2019-02-09 PROCEDURE — 36415 COLL VENOUS BLD VENIPUNCTURE: CPT

## 2019-02-09 PROCEDURE — 80053 COMPREHEN METABOLIC PANEL: CPT

## 2019-02-09 PROCEDURE — 21400001 HC TELEMETRY ROOM

## 2019-02-09 PROCEDURE — 25000242 PHARM REV CODE 250 ALT 637 W/ HCPCS: Performed by: NURSE PRACTITIONER

## 2019-02-09 PROCEDURE — 94640 AIRWAY INHALATION TREATMENT: CPT

## 2019-02-09 RX ADMIN — IPRATROPIUM BROMIDE AND ALBUTEROL SULFATE 3 ML: .5; 3 SOLUTION RESPIRATORY (INHALATION) at 04:02

## 2019-02-09 RX ADMIN — IPRATROPIUM BROMIDE AND ALBUTEROL SULFATE 3 ML: .5; 3 SOLUTION RESPIRATORY (INHALATION) at 11:02

## 2019-02-09 RX ADMIN — ITRACONAZOLE 200 MG: 100 CAPSULE ORAL at 09:02

## 2019-02-09 RX ADMIN — IPRATROPIUM BROMIDE AND ALBUTEROL SULFATE 3 ML: .5; 3 SOLUTION RESPIRATORY (INHALATION) at 03:02

## 2019-02-09 RX ADMIN — SULFAMETHOXAZOLE AND TRIMETHOPRIM 2 TABLET: 800; 160 TABLET ORAL at 09:02

## 2019-02-09 RX ADMIN — ITRACONAZOLE 200 MG: 100 CAPSULE ORAL at 10:02

## 2019-02-09 RX ADMIN — FAMOTIDINE 20 MG: 20 TABLET ORAL at 09:02

## 2019-02-09 RX ADMIN — PREDNISONE 40 MG: 20 TABLET ORAL at 09:02

## 2019-02-09 RX ADMIN — SULFAMETHOXAZOLE AND TRIMETHOPRIM 2 TABLET: 800; 160 TABLET ORAL at 04:02

## 2019-02-09 RX ADMIN — IPRATROPIUM BROMIDE AND ALBUTEROL SULFATE 3 ML: .5; 3 SOLUTION RESPIRATORY (INHALATION) at 07:02

## 2019-02-09 RX ADMIN — ITRACONAZOLE 200 MG: 100 CAPSULE ORAL at 03:02

## 2019-02-09 RX ADMIN — IPRATROPIUM BROMIDE AND ALBUTEROL SULFATE 3 ML: .5; 3 SOLUTION RESPIRATORY (INHALATION) at 12:02

## 2019-02-09 NOTE — ASSESSMENT & PLAN NOTE
Secondary to probable PJP  TB has been ruled out  Pulmonary following and patient is status post bronch  Extubated on 2/1  Stable at room air but still tachycardic. Will place back on NC at 1L

## 2019-02-09 NOTE — PLAN OF CARE
Problem: Fall Injury Risk  Goal: Absence of Fall and Fall-Related Injury  Outcome: Ongoing (interventions implemented as appropriate)  Intervention: Identify and Manage Contributors to Fall Injury Risk   02/09/19 0052   Manage Acute Allergic Reaction   Medication Review/Management medications reviewed   Identify and Manage Contributors to Fall Injury Risk   Self-Care Promotion BADL personal routines maintained;BADL personal objects within reach

## 2019-02-09 NOTE — PLAN OF CARE
Problem: Skin Injury Risk Increased  Goal: Skin Health and Integrity  Outcome: Ongoing (interventions implemented as appropriate)  Intervention: Optimize Skin Protection   02/09/19 0055   Prevent Additional Skin Injury   Head of Bed (HOB) HOB at 60-90 degrees   Pressure Reduction Techniques frequent weight shift encouraged   Monitor and Manage Hypervolemia   Skin Protection skin sealant/moisture barrier applied;incontinence pads utilized

## 2019-02-09 NOTE — ASSESSMENT & PLAN NOTE
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 However, histoplasma workup positive therefore there is a question about this being histo rather than PJP. Itrconazole initiated pending GMS stain of cytology specimen (requested by ID)  Aspergillus antigen positive. Cross reactive?   Plan to initiate antiretroviral therapy as outpatient once further testing done. However, patient states he does not want to initiate antiretroviral therapy because it is toxic. I spent > 5 min discussing the dangers of not treating HIV/AIDS (in [place]onese). Verbalized understanding but still wants to antiretroviral therapy.   Patient is undocumented, and resources will be difficult.    consulted for community resources. ID recommends f/u at [facility] with Dr Shine where there is Gabonese speaking staff. I discussed this at [facility]    Of note, patient now states he will stay in [place] and is motivated to f/u at [facility]

## 2019-02-09 NOTE — SUBJECTIVE & OBJECTIVE
Interval History: tachycardic but with stable sats at room air. Patient feels well and has no complaints.     Review of Systems   Respiratory: Negative for shortness of breath.    Cardiovascular: Negative.    Gastrointestinal: Negative.      Objective:     Vital Signs (Most Recent):  Temp: 98.6 °F (37 °C) (02/09/19 1115)  Pulse: (!) 122 (02/09/19 1123)  Resp: 18 (02/09/19 1123)  BP: 94/68 (02/09/19 1115)  SpO2: 96 % (02/09/19 1123) Vital Signs (24h Range):  Temp:  [97.7 °F (36.5 °C)-98.8 °F (37.1 °C)] 98.6 °F (37 °C)  Pulse:  [] 122  Resp:  [16-20] 18  SpO2:  [94 %-99 %] 96 %  BP: ()/(61-70) 94/68     Weight: 53.9 kg (118 lb 13.3 oz)  Body mass index is 19.18 kg/m².    Intake/Output Summary (Last 24 hours) at 2/9/2019 1245  Last data filed at 2/9/2019 0700  Gross per 24 hour   Intake 1380 ml   Output 1400 ml   Net -20 ml      Physical Exam   Constitutional: He is oriented to person, place, and time. He appears well-developed. No distress.   Cachectic, Burmese-speaking only   HENT:   + temporal wasting   Eyes: No scleral icterus.   Cardiovascular: Regular rhythm and normal heart sounds.   No murmur heard.  tachycardia   Pulmonary/Chest: Effort normal and breath sounds normal. No respiratory distress. He has no wheezes. He has no rhonchi. He exhibits no tenderness.   Abdominal: Soft. Bowel sounds are normal. There is no hepatosplenomegaly. There is no tenderness. There is no guarding.   Musculoskeletal: Normal range of motion. He exhibits no edema.   Neurological: He is alert and oriented to person, place, and time.   Skin: No rash noted. He is not diaphoretic. No erythema.   Nursing note and vitals reviewed.      Significant Labs: All pertinent labs within the past 24 hours have been reviewed.    Significant Imaging: I have reviewed all pertinent imaging results/findings within the past 24 hours.  I have reviewed and interpreted all pertinent imaging results/findings within the past 24 hours.

## 2019-02-09 NOTE — PROGRESS NOTES
"[facility]  Progress Note    Patient Name: Lior Prado  MRN: [id number]  Patient Class: IP- Inpatient   Admission Date: 1/28/2019  Length of Stay: 12 days  Attending Physician: Steph Guillen MD  Primary Care Provider: Primary Doctor No        Subjective:     Principal Problem:AIDS    HPI:  Mr. Donnelly is a 44 yo M with no reported past medical history- presents with a CC of weakness and some SOB for one week. He notes that he was treated with abx for a "pneumonia" 1/13-1/23.  He presents to the ED and is found to have thrush. Further, his HIV is +. The patient was started on treatment for PJP pneumonia and ID was consulted. The patient is thin and ill appearing.      Hospital Course:  Mr. Donnelly presented with shortness of breath and was admitted for treatment of PJP pneumonia and ID was consulted. The patient had a CTA of his chest that was negative for PE but was remarkable for a 1.8 cm irregular, cavitary opacity within the anterior segment of the right upper lobe, innumerable randomly distributed bilateral pulmonary nodules, patchy areas of subsegmental consolidation and nodular thickening of the right major and minor fissures, and mediastinal adenopathy.  Patient was started on empiric IV Bactrim and steroids.  He was transferred to the ICU due to high O2 requirements, rising lactate, and marginal blood pressures.  Pancytopenia was also present and worsened.  He was transfused 1 unit of platelets.  His blood pressures responded to IV fluid to alone and he did not require vasopressors. Pulmonology consulted for possible bronch in new diagnosis of HIV, and concern for PJP.  ID was also consulted.  On 1/30, CD4 count resulted at 11.  ID recommended bactrim and rocephin for probable PJP and CAP, respectively. Diflucan for oral candida. Pancytopenia likely due to sepsis, possible opportunistic infections and AIDS. Transaminases and lactate/LDH rising. On 1/31, " patient underwent bronchoscopy and remained on the ventilator postprocedure.  He was later extubated on 2/01 with use of BiPAP p.r.n. Rapid AFB stain resulted negative and was taken off isolation on 2/2.  His oxygen requirements continued to decline and liver function tests continue to improve.  He was stable for transfer to floor on 2/3. Oxygen requirements improving with intermittent doses of IV furosemide 20 mg for 3 days.     Interval History: tachycardic but with stable sats at room air. Patient feels well and has no complaints.     Review of Systems   Respiratory: Negative for shortness of breath.    Cardiovascular: Negative.    Gastrointestinal: Negative.      Objective:     Vital Signs (Most Recent):  Temp: 98.6 °F (37 °C) (02/09/19 1115)  Pulse: (!) 122 (02/09/19 1123)  Resp: 18 (02/09/19 1123)  BP: 94/68 (02/09/19 1115)  SpO2: 96 % (02/09/19 1123) Vital Signs (24h Range):  Temp:  [97.7 °F (36.5 °C)-98.8 °F (37.1 °C)] 98.6 °F (37 °C)  Pulse:  [] 122  Resp:  [16-20] 18  SpO2:  [94 %-99 %] 96 %  BP: ()/(61-70) 94/68     Weight: 53.9 kg (118 lb 13.3 oz)  Body mass index is 19.18 kg/m².    Intake/Output Summary (Last 24 hours) at 2/9/2019 1245  Last data filed at 2/9/2019 0700  Gross per 24 hour   Intake 1380 ml   Output 1400 ml   Net -20 ml      Physical Exam   Constitutional: He is oriented to person, place, and time. He appears well-developed. No distress.   Cachectic, English-speaking only   HENT:   + temporal wasting   Eyes: No scleral icterus.   Cardiovascular: Regular rhythm and normal heart sounds.   No murmur heard.  tachycardia   Pulmonary/Chest: Effort normal and breath sounds normal. No respiratory distress. He has no wheezes. He has no rhonchi. He exhibits no tenderness.   Abdominal: Soft. Bowel sounds are normal. There is no hepatosplenomegaly. There is no tenderness. There is no guarding.   Musculoskeletal: Normal range of motion. He exhibits no edema.   Neurological: He is alert and  oriented to person, place, and time.   Skin: No rash noted. He is not diaphoretic. No erythema.   Nursing note and vitals reviewed.      Significant Labs: All pertinent labs within the past 24 hours have been reviewed.    Significant Imaging: I have reviewed all pertinent imaging results/findings within the past 24 hours.  I have reviewed and interpreted all pertinent imaging results/findings within the past 24 hours.    Assessment/Plan:      * AIDS    Pt was unaware of virus and does not want information to be shared with anyone  He reported he has a wife in [place] who is healthy   Pt reportedly denied intercourse x 7 years, no IV drug use, no incarceration, no blood transfusions, professional tattoos with clean needles to his knowledge, no visits to [place] in 7 years, no other sick contacts   CD4 count is 11, and with opportunistic infection consistent with AIDS  Patient currently on treatment for PJP pneumonia with bactrim (x 21 days , then maintenance until follow up) and steroids. However, histoplasma workup positive therefore there is a question about this being histo rather than PJP. Itrconazole initiated pending GMS stain of cytology specimen (requested by ID)  Aspergillus antigen positive. Cross reactive?   Plan to initiate antiretroviral therapy as outpatient once further testing done. However, patient states he does not want to initiate antiretroviral therapy because it is toxic. I spent > 5 min discussing the dangers of not treating HIV/AIDS (in [place]ese). Verbalized understanding but still wants to antiretroviral therapy.   Patient is undocumented, and resources will be difficult.    consulted for community resources. ID recommends f/u at [facility] with Dr Shine where there is Sudanese speaking staff. I discussed this at [facility]    Of note, patient now states he will stay in [place] and is motivated to f/u at [facility]     Acute hypoxemic respiratory failure    Secondary to probable  PJP  TB has been ruled out  Pulmonary following and patient is status post bronch  Extubated on 2/1  Stable at room air but still tachycardic. Will place back on NC at 1L     Pneumonia, unspecified organism    Presumed PJP insetting of AIDS  Rocephin discontinued by ID  Bactrim now transitioned to PO. On prednisone. Will need taper  Weaning oxygen as tolerated             Pancytopenia    Secondary to AIDS and/or medication induced  Thrombocytopenia 12k s/p transfusion of 1 unit platelets prior to bronch  Appreciate Heme-Onc input  Platelets overall stable  Will continue to monitor     Elevated LFTs    Likely due to sepsis and/or AIDS  Hepatitis panel negative  No abdominal pain and bilirubin not suggesting obstructive pattern or HIV cholangiopathy   U/S with fatty liver   LFTs are improving, will continue to monitor         Malnutrition of moderate degree    Pt did not comment on changes in diet  Due to AIDS  Will supplement nutrition with boost glucose control     Tobacco abuse    Smoking cessation education with > 3 min of counseling  Nicotine patch offered        Hyponatremia    Improved with IVF  Will continue monitor.     Weakness    Much better. Independent       Thrush    Thrush no longer present  Fluconazole discontinued       VTE Risk Mitigation (From admission, onward)        Ordered     IP VTE LOW RISK PATIENT  Once      01/28/19 1927     Place DENY hose  Until discontinued      01/28/19 1927              Steph Edmondson MD  Department of Hospital Medicine   [facility]

## 2019-02-10 LAB
ALBUMIN SERPL BCP-MCNC: 2.7 G/DL
ALP SERPL-CCNC: 238 U/L
ALT SERPL W/O P-5'-P-CCNC: 69 U/L
ANION GAP SERPL CALC-SCNC: 10 MMOL/L
AST SERPL-CCNC: 22 U/L
BASOPHILS # BLD AUTO: 0 K/UL
BASOPHILS NFR BLD: 0 %
BILIRUB SERPL-MCNC: 0.3 MG/DL
BUN SERPL-MCNC: 25 MG/DL
CALCIUM SERPL-MCNC: 8.6 MG/DL
CHLORIDE SERPL-SCNC: 94 MMOL/L
CO2 SERPL-SCNC: 22 MMOL/L
CREAT SERPL-MCNC: 0.7 MG/DL
DIFFERENTIAL METHOD: ABNORMAL
EOSINOPHIL # BLD AUTO: 0 K/UL
EOSINOPHIL NFR BLD: 0.2 %
ERYTHROCYTE [DISTWIDTH] IN BLOOD BY AUTOMATED COUNT: 19.2 %
EST. GFR  (AFRICAN AMERICAN): >60 ML/MIN/1.73 M^2
EST. GFR  (NON AFRICAN AMERICAN): >60 ML/MIN/1.73 M^2
GIANT PLATELETS BLD QL SMEAR: PRESENT
GLUCOSE SERPL-MCNC: 85 MG/DL
HCT VFR BLD AUTO: 31.1 %
HGB BLD-MCNC: 10.3 G/DL
LYMPHOCYTES # BLD AUTO: 0.7 K/UL
LYMPHOCYTES NFR BLD: 10.7 %
MCH RBC QN AUTO: 26.1 PG
MCHC RBC AUTO-ENTMCNC: 33.1 G/DL
MCV RBC AUTO: 79 FL
MONOCYTES # BLD AUTO: 0.1 K/UL
MONOCYTES NFR BLD: 1.6 %
NEUTROPHILS # BLD AUTO: 5.5 K/UL
NEUTROPHILS NFR BLD: 88.9 %
PLATELET # BLD AUTO: 73 K/UL
PLATELET BLD QL SMEAR: ABNORMAL
PMV BLD AUTO: ABNORMAL FL
POTASSIUM SERPL-SCNC: 4.7 MMOL/L
PROT SERPL-MCNC: 6.7 G/DL
RBC # BLD AUTO: 3.94 M/UL
SODIUM SERPL-SCNC: 126 MMOL/L
TOXIC GRANULES BLD QL SMEAR: PRESENT
WBC # BLD AUTO: 6.26 K/UL

## 2019-02-10 PROCEDURE — 94640 AIRWAY INHALATION TREATMENT: CPT

## 2019-02-10 PROCEDURE — 25000003 PHARM REV CODE 250: Performed by: PHYSICIAN ASSISTANT

## 2019-02-10 PROCEDURE — 36415 COLL VENOUS BLD VENIPUNCTURE: CPT

## 2019-02-10 PROCEDURE — 27000221 HC OXYGEN, UP TO 24 HOURS

## 2019-02-10 PROCEDURE — 99900035 HC TECH TIME PER 15 MIN (STAT)

## 2019-02-10 PROCEDURE — 80053 COMPREHEN METABOLIC PANEL: CPT

## 2019-02-10 PROCEDURE — 25000003 PHARM REV CODE 250: Performed by: EMERGENCY MEDICINE

## 2019-02-10 PROCEDURE — 25000242 PHARM REV CODE 250 ALT 637 W/ HCPCS: Performed by: NURSE PRACTITIONER

## 2019-02-10 PROCEDURE — 63600175 PHARM REV CODE 636 W HCPCS: Performed by: INTERNAL MEDICINE

## 2019-02-10 PROCEDURE — 85025 COMPLETE CBC W/AUTO DIFF WBC: CPT

## 2019-02-10 PROCEDURE — 21400001 HC TELEMETRY ROOM

## 2019-02-10 PROCEDURE — 94761 N-INVAS EAR/PLS OXIMETRY MLT: CPT

## 2019-02-10 RX ORDER — PREDNISONE 20 MG/1
20 TABLET ORAL DAILY
Status: COMPLETED | OUTPATIENT
Start: 2019-02-11 | End: 2019-02-13

## 2019-02-10 RX ORDER — IPRATROPIUM BROMIDE AND ALBUTEROL SULFATE 2.5; .5 MG/3ML; MG/3ML
3 SOLUTION RESPIRATORY (INHALATION) EVERY 6 HOURS PRN
Status: DISCONTINUED | OUTPATIENT
Start: 2019-02-10 | End: 2019-02-13 | Stop reason: HOSPADM

## 2019-02-10 RX ORDER — PREDNISONE 5 MG/1
10 TABLET ORAL DAILY
Status: DISCONTINUED | OUTPATIENT
Start: 2019-02-14 | End: 2019-02-13 | Stop reason: HOSPADM

## 2019-02-10 RX ORDER — PREDNISONE 5 MG/1
5 TABLET ORAL DAILY
Status: DISCONTINUED | OUTPATIENT
Start: 2019-02-17 | End: 2019-02-13 | Stop reason: HOSPADM

## 2019-02-10 RX ADMIN — IPRATROPIUM BROMIDE AND ALBUTEROL SULFATE 3 ML: .5; 3 SOLUTION RESPIRATORY (INHALATION) at 12:02

## 2019-02-10 RX ADMIN — ITRACONAZOLE 200 MG: 100 CAPSULE ORAL at 10:02

## 2019-02-10 RX ADMIN — SULFAMETHOXAZOLE AND TRIMETHOPRIM 2 TABLET: 800; 160 TABLET ORAL at 08:02

## 2019-02-10 RX ADMIN — PREDNISONE 40 MG: 20 TABLET ORAL at 08:02

## 2019-02-10 RX ADMIN — ITRACONAZOLE 200 MG: 100 CAPSULE ORAL at 03:02

## 2019-02-10 RX ADMIN — FAMOTIDINE 20 MG: 20 TABLET ORAL at 10:02

## 2019-02-10 RX ADMIN — SULFAMETHOXAZOLE AND TRIMETHOPRIM 2 TABLET: 800; 160 TABLET ORAL at 10:02

## 2019-02-10 RX ADMIN — FAMOTIDINE 20 MG: 20 TABLET ORAL at 08:02

## 2019-02-10 RX ADMIN — IPRATROPIUM BROMIDE AND ALBUTEROL SULFATE 3 ML: .5; 3 SOLUTION RESPIRATORY (INHALATION) at 04:02

## 2019-02-10 RX ADMIN — ITRACONAZOLE 200 MG: 100 CAPSULE ORAL at 08:02

## 2019-02-10 RX ADMIN — SULFAMETHOXAZOLE AND TRIMETHOPRIM 2 TABLET: 800; 160 TABLET ORAL at 03:02

## 2019-02-10 NOTE — SUBJECTIVE & OBJECTIVE
Interval History: tachycardic. Sates he felt palpitations when walking down hallway yesterday. States breathing is ok. No chest pain    Review of Systems   Respiratory: Negative for shortness of breath.    Cardiovascular: Positive for palpitations.   Gastrointestinal: Negative.      Objective:     Vital Signs (Most Recent):  Temp: 98.7 °F (37.1 °C) (02/10/19 0732)  Pulse: (!) 120 (02/10/19 0820)  Resp: 20 (02/10/19 0820)  BP: (!) 95/56 (02/10/19 0732)  SpO2: 97 % (02/10/19 0820) Vital Signs (24h Range):  Temp:  [98 °F (36.7 °C)-99 °F (37.2 °C)] 98.7 °F (37.1 °C)  Pulse:  [105-122] 120  Resp:  [16-20] 20  SpO2:  [96 %-99 %] 97 %  BP: ()/(56-70) 95/56     Weight: 53.9 kg (118 lb 13.3 oz)  Body mass index is 19.18 kg/m².    Intake/Output Summary (Last 24 hours) at 2/10/2019 1027  Last data filed at 2/10/2019 0400  Gross per 24 hour   Intake 710 ml   Output 350 ml   Net 360 ml      Physical Exam   Constitutional: He is oriented to person, place, and time. He appears well-developed. No distress.   Cachectic, Slovak-speaking only   HENT:   + temporal wasting   Eyes: No scleral icterus.   Cardiovascular: Regular rhythm and normal heart sounds.   No murmur heard.  tachycardia   Pulmonary/Chest: Effort normal and breath sounds normal. No respiratory distress. He has no wheezes. He has no rhonchi. He has no rales. He exhibits no tenderness.   Abdominal: Soft. Bowel sounds are normal. There is no hepatosplenomegaly. There is no tenderness. There is no guarding.   Musculoskeletal: Normal range of motion. He exhibits no edema.   Neurological: He is alert and oriented to person, place, and time.   Skin: No rash noted. He is not diaphoretic. No erythema.   Nursing note and vitals reviewed.      Significant Labs: All pertinent labs within the past 24 hours have been reviewed.    Significant Imaging: I have reviewed all pertinent imaging results/findings within the past 24 hours.  I have reviewed and interpreted all pertinent  imaging results/findings within the past 24 hours.

## 2019-02-10 NOTE — ASSESSMENT & PLAN NOTE
Presumed PJP insetting of AIDS  Rocephin discontinued by ID  Bactrim now transitioned to PO. On prednisone. Tapering

## 2019-02-10 NOTE — ASSESSMENT & PLAN NOTE
Secondary to probable PJP  TB has been ruled out  Pulmonary following and patient is status post bronch  Extubated on 2/1  Stable at room air but still tachycardic.Hold off on scheduled nebs

## 2019-02-10 NOTE — PROGRESS NOTES
"[facility]  Progress Note    Patient Name: Lior Prado  MRN: [id number]  Patient Class: IP- Inpatient   Admission Date: 1/28/2019  Length of Stay: 13 days  Attending Physician: Steph Guillen MD  Primary Care Provider: Primary Doctor No        Subjective:     Principal Problem:AIDS    HPI:  Mr. Donnelly is a 42 yo M with no reported past medical history- presents with a CC of weakness and some SOB for one week. He notes that he was treated with abx for a "pneumonia" 1/13-1/23.  He presents to the ED and is found to have thrush. Further, his HIV is +. The patient was started on treatment for PJP pneumonia and ID was consulted. The patient is thin and ill appearing.      Hospital Course:  Mr. Donnelly presented with shortness of breath and was admitted for treatment of PJP pneumonia and ID was consulted. The patient had a CTA of his chest that was negative for PE but was remarkable for a 1.8 cm irregular, cavitary opacity within the anterior segment of the right upper lobe, innumerable randomly distributed bilateral pulmonary nodules, patchy areas of subsegmental consolidation and nodular thickening of the right major and minor fissures, and mediastinal adenopathy.  Patient was started on empiric IV Bactrim and steroids.  He was transferred to the ICU due to high O2 requirements, rising lactate, and marginal blood pressures.  Pancytopenia was also present and worsened.  He was transfused 1 unit of platelets.  His blood pressures responded to IV fluid to alone and he did not require vasopressors. Pulmonology consulted for possible bronch in new diagnosis of HIV, and concern for PJP.  ID was also consulted.  On 1/30, CD4 count resulted at 11.  ID recommended bactrim and rocephin for probable PJP and CAP, respectively. Diflucan for oral candida. Pancytopenia likely due to sepsis, possible opportunistic infections and AIDS. Transaminases and lactate/LDH rising. On 1/31, " patient underwent bronchoscopy and remained on the ventilator postprocedure.  He was later extubated on 2/01 with use of BiPAP p.r.n. Rapid AFB stain resulted negative and was taken off isolation on 2/2.  His oxygen requirements continued to decline and liver function tests continue to improve.  He was stable for transfer to floor on 2/3. Oxygen requirements improving with intermittent doses of IV furosemide 20 mg for 3 days.     Interval History: tachycardic. Sates he felt palpitations when walking down hallway yesterday. States breathing is ok. No chest pain    Review of Systems   Respiratory: Negative for shortness of breath.    Cardiovascular: Positive for palpitations.   Gastrointestinal: Negative.      Objective:     Vital Signs (Most Recent):  Temp: 98.7 °F (37.1 °C) (02/10/19 0732)  Pulse: (!) 120 (02/10/19 0820)  Resp: 20 (02/10/19 0820)  BP: (!) 95/56 (02/10/19 0732)  SpO2: 97 % (02/10/19 0820) Vital Signs (24h Range):  Temp:  [98 °F (36.7 °C)-99 °F (37.2 °C)] 98.7 °F (37.1 °C)  Pulse:  [105-122] 120  Resp:  [16-20] 20  SpO2:  [96 %-99 %] 97 %  BP: ()/(56-70) 95/56     Weight: 53.9 kg (118 lb 13.3 oz)  Body mass index is 19.18 kg/m².    Intake/Output Summary (Last 24 hours) at 2/10/2019 1027  Last data filed at 2/10/2019 0400  Gross per 24 hour   Intake 710 ml   Output 350 ml   Net 360 ml      Physical Exam   Constitutional: He is oriented to person, place, and time. He appears well-developed. No distress.   Cachectic, Slovenian-speaking only   HENT:   + temporal wasting   Eyes: No scleral icterus.   Cardiovascular: Regular rhythm and normal heart sounds.   No murmur heard.  tachycardia   Pulmonary/Chest: Effort normal and breath sounds normal. No respiratory distress. He has no wheezes. He has no rhonchi. He has no rales. He exhibits no tenderness.   Abdominal: Soft. Bowel sounds are normal. There is no hepatosplenomegaly. There is no tenderness. There is no guarding.   Musculoskeletal: Normal range  of motion. He exhibits no edema.   Neurological: He is alert and oriented to person, place, and time.   Skin: No rash noted. He is not diaphoretic. No erythema.   Nursing note and vitals reviewed.      Significant Labs: All pertinent labs within the past 24 hours have been reviewed.    Significant Imaging: I have reviewed all pertinent imaging results/findings within the past 24 hours.  I have reviewed and interpreted all pertinent imaging results/findings within the past 24 hours.    Assessment/Plan:      * AIDS    Pt was unaware of virus and does not want information to be shared with anyone  He reported he has a wife in [place] who is healthy   Pt reportedly denied intercourse x 7 years, no IV drug use, no incarceration, no blood transfusions, professional tattoos with clean needles to his knowledge, no visits to [place] in 7 years, no other sick contacts   CD4 count is 11, and with opportunistic infection consistent with AIDS  Patient currently on treatment for PJP pneumonia with bactrim (x 21 days , then maintenance until follow up) and steroids. However, histoplasma workup positive therefore there is a question about this being histo rather than PJP. Itrconazole initiated pending GMS stain of cytology specimen (requested by ID)  Aspergillus antigen positive. Cross reactive?   Plan to initiate antiretroviral therapy as outpatient once further testing done. However, patient states he does not want to initiate antiretroviral therapy because it is toxic. I spent > 5 min discussing the dangers of not treating HIV/AIDS (in Citizen of [place]). Verbalized understanding but still wants to antiretroviral therapy.   Patient is undocumented, and resources will be difficult.    consulted for community resources. ID recommends f/u at [facility] with Dr Shine where there is Citizen of [place] speaking staff. I discussed this at huddle    Of note, patient now states he will stay in [place] and is motivated to f/u at [facility]     Acute hypoxemic respiratory failure    Secondary to probable PJP  TB has been ruled out  Pulmonary following and patient is status post bronch  Extubated on 2/1  Stable at room air but still tachycardic.Hold off on scheduled nebs     Pneumonia, unspecified organism    Presumed PJP insetting of AIDS  Rocephin discontinued by ID  Bactrim now transitioned to PO. On prednisone. Tapering             Pancytopenia    Secondary to AIDS and/or medication induced  Thrombocytopenia 12k s/p transfusion of 1 unit platelets prior to bronch  Appreciate Heme-Onc input  Platelets overall stable  Will continue to monitor     Elevated LFTs    Likely due to sepsis and/or AIDS  Hepatitis panel negative  No abdominal pain and bilirubin not suggesting obstructive pattern or HIV cholangiopathy   U/S with fatty liver   LFTs are improving, will continue to monitor         Malnutrition of moderate degree    Pt did not comment on changes in diet  Due to AIDS  Will supplement nutrition with boost glucose control     Tobacco abuse    Smoking cessation education with > 3 min of counseling  Nicotine patch offered        Hyponatremia    Improved with IVF  Will continue monitor.     Weakness    Much better. Independent       Thrush    Thrush no longer present  Fluconazole discontinued       VTE Risk Mitigation (From admission, onward)        Ordered     IP VTE LOW RISK PATIENT  Once      01/28/19 1927     Place DENY hose  Until discontinued      01/28/19 1927          Dispo: home with f/u once cleared by ID    Steph Edmondson MD  Department of Hospital Medicine   [facility] [place]

## 2019-02-10 NOTE — PLAN OF CARE
Problem: HIV/AIDS Infection  Goal: HIV/AIDS Symptom Control  Outcome: Ongoing (interventions implemented as appropriate)  Intervention: Prevent Infection and Maximize Resistance   02/10/19 0406   Prevent Infection and Maximize Resistance   Infection Prevention environmental surveillance performed;rest/sleep promoted;personal protective equipment utilized         Problem: Infection  Goal: Infection Symptom Resolution  Outcome: Ongoing (interventions implemented as appropriate)  Intervention: Prevent or Manage Infection   02/10/19 0406   Prevent or Manage Infection   Fever Reduction/Comfort Measures medication administered   Infection Management aseptic technique maintained         Problem: Respiratory Compromise (Pneumonia)  Goal: Effective Oxygenation and Ventilation    Intervention: Optimize Oxygenation and Ventilation   02/10/19 0406   Prevent Additional Skin Injury   Head of Bed (HOB) HOB at 30-45 degrees

## 2019-02-11 LAB
ALBUMIN SERPL BCP-MCNC: 2.5 G/DL
ALP SERPL-CCNC: 248 U/L
ALT SERPL W/O P-5'-P-CCNC: 75 U/L
ANION GAP SERPL CALC-SCNC: 7 MMOL/L
AST SERPL-CCNC: 26 U/L
BILIRUB SERPL-MCNC: 0.3 MG/DL
BUN SERPL-MCNC: 27 MG/DL
CALCIUM SERPL-MCNC: 8.4 MG/DL
CHLORIDE SERPL-SCNC: 96 MMOL/L
CO2 SERPL-SCNC: 24 MMOL/L
CREAT SERPL-MCNC: 0.8 MG/DL
EST. GFR  (AFRICAN AMERICAN): >60 ML/MIN/1.73 M^2
EST. GFR  (NON AFRICAN AMERICAN): >60 ML/MIN/1.73 M^2
GLUCOSE SERPL-MCNC: 94 MG/DL
POTASSIUM SERPL-SCNC: 4.4 MMOL/L
PROT SERPL-MCNC: 6.2 G/DL
SODIUM SERPL-SCNC: 127 MMOL/L

## 2019-02-11 PROCEDURE — 80053 COMPREHEN METABOLIC PANEL: CPT

## 2019-02-11 PROCEDURE — 63600175 PHARM REV CODE 636 W HCPCS: Performed by: INTERNAL MEDICINE

## 2019-02-11 PROCEDURE — 36415 COLL VENOUS BLD VENIPUNCTURE: CPT

## 2019-02-11 PROCEDURE — 21400001 HC TELEMETRY ROOM

## 2019-02-11 PROCEDURE — 25000003 PHARM REV CODE 250: Performed by: PHYSICIAN ASSISTANT

## 2019-02-11 PROCEDURE — 25000003 PHARM REV CODE 250: Performed by: INTERNAL MEDICINE

## 2019-02-11 PROCEDURE — 99900035 HC TECH TIME PER 15 MIN (STAT)

## 2019-02-11 PROCEDURE — 99233 SBSQ HOSP IP/OBS HIGH 50: CPT | Mod: ,,, | Performed by: NURSE PRACTITIONER

## 2019-02-11 PROCEDURE — 25000003 PHARM REV CODE 250: Performed by: NURSE PRACTITIONER

## 2019-02-11 PROCEDURE — 25000003 PHARM REV CODE 250: Performed by: EMERGENCY MEDICINE

## 2019-02-11 PROCEDURE — 80299 QUANTITATIVE ASSAY DRUG: CPT

## 2019-02-11 PROCEDURE — 94761 N-INVAS EAR/PLS OXIMETRY MLT: CPT

## 2019-02-11 PROCEDURE — 99233 PR SUBSEQUENT HOSPITAL CARE,LEVL III: ICD-10-PCS | Mod: ,,, | Performed by: NURSE PRACTITIONER

## 2019-02-11 RX ORDER — ITRACONAZOLE 100 MG/1
200 CAPSULE ORAL 2 TIMES DAILY
Status: DISCONTINUED | OUTPATIENT
Start: 2019-02-11 | End: 2019-02-13 | Stop reason: HOSPADM

## 2019-02-11 RX ORDER — DILTIAZEM HYDROCHLORIDE 30 MG/1
30 TABLET, FILM COATED ORAL EVERY 6 HOURS
Status: COMPLETED | OUTPATIENT
Start: 2019-02-11 | End: 2019-02-12

## 2019-02-11 RX ORDER — DILTIAZEM HYDROCHLORIDE 30 MG/1
30 TABLET, FILM COATED ORAL EVERY 6 HOURS
Status: DISCONTINUED | OUTPATIENT
Start: 2019-02-11 | End: 2019-02-11

## 2019-02-11 RX ORDER — SULFAMETHOXAZOLE AND TRIMETHOPRIM 800; 160 MG/1; MG/1
1 TABLET ORAL DAILY
Status: DISCONTINUED | OUTPATIENT
Start: 2019-02-12 | End: 2019-02-13 | Stop reason: HOSPADM

## 2019-02-11 RX ADMIN — SULFAMETHOXAZOLE AND TRIMETHOPRIM 2 TABLET: 800; 160 TABLET ORAL at 02:02

## 2019-02-11 RX ADMIN — ITRACONAZOLE 200 MG: 100 CAPSULE, COATED PELLETS ORAL at 08:02

## 2019-02-11 RX ADMIN — DILTIAZEM HYDROCHLORIDE 30 MG: 30 TABLET, FILM COATED ORAL at 11:02

## 2019-02-11 RX ADMIN — DILTIAZEM HYDROCHLORIDE 30 MG: 30 TABLET, FILM COATED ORAL at 05:02

## 2019-02-11 RX ADMIN — SULFAMETHOXAZOLE AND TRIMETHOPRIM 2 TABLET: 800; 160 TABLET ORAL at 08:02

## 2019-02-11 RX ADMIN — ITRACONAZOLE 200 MG: 100 CAPSULE ORAL at 08:02

## 2019-02-11 RX ADMIN — FAMOTIDINE 20 MG: 20 TABLET ORAL at 08:02

## 2019-02-11 RX ADMIN — PREDNISONE 20 MG: 20 TABLET ORAL at 08:02

## 2019-02-11 NOTE — SUBJECTIVE & OBJECTIVE
Interval History: feels much better today. Tachycardia has improved.     Review of Systems   Respiratory: Negative for shortness of breath.    Cardiovascular: Negative for palpitations.   Gastrointestinal: Negative.      Objective:     Vital Signs (Most Recent):  Temp: 98 °F (36.7 °C) (02/11/19 1546)  Pulse: 107 (02/11/19 1546)  Resp: 18 (02/11/19 1546)  BP: (!) 106/58 (02/11/19 1546)  SpO2: 95 % (02/11/19 1546) Vital Signs (24h Range):  Temp:  [98 °F (36.7 °C)-98.6 °F (37 °C)] 98 °F (36.7 °C)  Pulse:  [107-120] 107  Resp:  [17-20] 18  SpO2:  [95 %-98 %] 95 %  BP: (101-107)/(56-61) 106/58     Weight: 53.9 kg (118 lb 13.3 oz)  Body mass index is 19.18 kg/m².    Intake/Output Summary (Last 24 hours) at 2/11/2019 1724  Last data filed at 2/11/2019 0500  Gross per 24 hour   Intake 240 ml   Output 850 ml   Net -610 ml      Physical Exam   Constitutional: He is oriented to person, place, and time. He appears well-developed. No distress.   Cachectic, Persian-speaking only   HENT:   + temporal wasting   Eyes: No scleral icterus.   Cardiovascular: Regular rhythm and normal heart sounds.   No murmur heard.  tachycardia   Pulmonary/Chest: Effort normal and breath sounds normal. No respiratory distress. He has no wheezes. He has no rhonchi. He has no rales. He exhibits no tenderness.   Abdominal: Soft. Bowel sounds are normal. There is no hepatosplenomegaly. There is no tenderness. There is no guarding.   Musculoskeletal: Normal range of motion. He exhibits no edema.   Neurological: He is alert and oriented to person, place, and time.   Skin: No rash noted. He is not diaphoretic. No erythema.   Nursing note and vitals reviewed.      Significant Labs: All pertinent labs within the past 24 hours have been reviewed.    Significant Imaging: I have reviewed all pertinent imaging results/findings within the past 24 hours.  I have reviewed and interpreted all pertinent imaging results/findings within the past 24 hours.

## 2019-02-11 NOTE — ASSESSMENT & PLAN NOTE
42 y/o [place] speaking male with no significant medical history presents with progressively worsening SOB, cough, weakness and weight loss. Denied hemoptysis or sputum production. He was found to be positive for HIV with CD4 count of 11.  ID work up initiated:    Blood cultures 1/28 NGTD  CXR with concerns for PCP pneumonia vs pulmonary histoplasmosis.   CTA shows 1.8 cm irregular, cavitary opacity within the anterior segment of the right upper lobe  Pt now S/p bronchoscopy; AFB cultures NGTD, cytology not sent??  Fungitell positive  TB PCR NGTD; quant gold indeterminate; repeated yesterday  Crypto negative  c diff negative; stool culture negative  RPR/toxo negative  Repeat Aspergillus antigen positive, however, there can be cross reaction.    Urine histo positive  Fungal immunodiffusion assay - aspergillus negative, histoplasma positive      Given positive histoplasma in urine and fungal assay, suspect this may be pulmonary histoplasmosis rather than PJP.   Dr. Poon had called pathology to ask to run silver stain on cytology to r/o PCP and this is pending, but greater suspicion for histoplasmosis.  Patient completed loading dose of itraconazole.  Level pending.     Plan:  1. Decrease bactrim to 1 DS tablet daily for PJP ppx   2. Decrease Itraconazole to 200 mg twice a day.   3. Follow up itraconazole level - no discharge until level resulted.   4. Continue prednisone for now  5. Hold off on MAC prophylaxis for now but will need to start as outpatient given CD4 of 11  6. Will hold off on starting antiviral therapy for now as starting medication can cause risk of IRIS and pt not currently wanting to start HIV meds; pt will need f/u as outpatient; per Dr. Lali Shine at [facility] is [place] speaking and she may be a good provider for this patient to see outpatient  7. Will follow up tomorrow    Data reviewed and plan discussed with ID staff  Discussed with Primary Team

## 2019-02-11 NOTE — SUBJECTIVE & OBJECTIVE
Interval History: Afebrile, no leukocytosis. Looks very well today, cheerful.   Fungal immunodiffusion assay showing aspergillus negative, histoplasma positive.  Suspect pulmonary histoplasmosis rather than PJP.   Patient now more willing to consider antiretroviral therapy.       Review of Systems   Constitutional: Negative for activity change, chills and fever.   HENT: Negative.    Respiratory: Positive for cough (improved) and shortness of breath (improved).    Gastrointestinal: Negative for diarrhea, nausea and vomiting.   Skin: Negative for wound.   Neurological: Positive for weakness.     Objective:     Vital Signs (Most Recent):  Temp: 98.6 °F (37 °C) (02/11/19 0705)  Pulse: (!) 113 (02/11/19 0705)  Resp: 18 (02/11/19 0705)  BP: (!) 107/58 (02/11/19 0705)  SpO2: 96 % (02/11/19 0816) Vital Signs (24h Range):  Temp:  [98 °F (36.7 °C)-99.4 °F (37.4 °C)] 98.6 °F (37 °C)  Pulse:  [108-126] 113  Resp:  [17-20] 18  SpO2:  [94 %-98 %] 96 %  BP: ()/(56-71) 107/58     Weight: 53.9 kg (118 lb 13.3 oz)  Body mass index is 19.18 kg/m².    Estimated Creatinine Clearance: 90.8 mL/min (based on SCr of 0.8 mg/dL).    Physical Exam   Constitutional: He appears cachectic. No distress.   Thin  Sitting up in bed, on phone, very cheerful    HENT:   Head: Normocephalic.   Eyes: Conjunctivae are normal. No scleral icterus.   Cardiovascular: Regular rhythm and normal heart sounds. Tachycardia present. Exam reveals no friction rub.   No murmur heard.  Pulmonary/Chest: Effort normal and breath sounds normal. No stridor. No respiratory distress. He has no wheezes.   Abdominal: Soft. He exhibits no distension. There is no tenderness. There is no guarding.   Musculoskeletal: He exhibits no tenderness.   Skin: Skin is warm. He is not diaphoretic. No erythema. No pallor.   Psychiatric: He has a normal mood and affect.   Vitals reviewed.      Significant Labs:   Blood Culture:   Recent Labs   Lab 01/28/19  1023 01/28/19  1028   MARA  No growth after 5 days. No growth after 5 days.     CBC:   Recent Labs   Lab 02/10/19  0438   WBC 6.26   HGB 10.3*   HCT 31.1*   PLT 73*     CMP:   Recent Labs   Lab 02/10/19  0438 02/11/19  0537   * 127*   K 4.7 4.4   CL 94* 96   CO2 22* 24   GLU 85 94   BUN 25* 27*   CREATININE 0.7 0.8   CALCIUM 8.6* 8.4*   PROT 6.7 6.2   ALBUMIN 2.7* 2.5*   BILITOT 0.3 0.3   ALKPHOS 238* 248*   AST 22 26   ALT 69* 75*   ANIONGAP 10 7*   EGFRNONAA >60 >60     Respiratory Culture:   Recent Labs   Lab 01/31/19  1035   GSRESP <10 epithelial cells per low power field.  Many WBC's  Many budding yeast   RESPIRATORYC No growth       Significant Imaging: I have reviewed all pertinent imaging results/findings within the past 24 hours.

## 2019-02-11 NOTE — PLAN OF CARE
Problem: Fall Injury Risk  Goal: Absence of Fall and Fall-Related Injury    Intervention: Identify and Manage Contributors to Fall Injury Risk   02/10/19 0800 02/11/19 0504   Manage Acute Allergic Reaction   Medication Review/Management --  medications reviewed   Identify and Manage Contributors to Fall Injury Risk   Self-Care Promotion BADL personal routines maintained;BADL personal objects within reach --      Intervention: Promote Injury-Free Environment   02/10/19 2130 02/11/19 1700   Optimize [place] and Functional Mobility   Environmental Safety Modification assistive device/personal items within reach;clutter free environment maintained --    Optimize Balance and Safe Activity   Safety Promotion/Fall Prevention --  bed alarm set;Fall Risk reviewed with patient/family;instructed to call staff for mobility

## 2019-02-11 NOTE — PLAN OF CARE
Problem: Fall Injury Risk  Goal: Absence of Fall and Fall-Related Injury  Outcome: Ongoing (interventions implemented as appropriate)  Intervention: Identify and Manage Contributors to Fall Injury Risk   02/10/19 0800 02/11/19 0504   Manage Acute Allergic Reaction   Medication Review/Management --  medications reviewed   Identify and Manage Contributors to Fall Injury Risk   Self-Care Promotion BADL personal routines maintained;BADL personal objects within reach --          Problem: HIV/AIDS Infection  Goal: HIV/AIDS Symptom Control  Outcome: Ongoing (interventions implemented as appropriate)  Intervention: Prevent Infection and Maximize Resistance   02/10/19 0406   Prevent Infection and Maximize Resistance   Infection Prevention environmental surveillance performed;rest/sleep promoted;personal protective equipment utilized         Problem: Infection (Pneumonia)  Goal: Resolution of Infection Signs/Symptoms  Outcome: Ongoing (interventions implemented as appropriate)  Intervention: Prevent Infection Progression   02/10/19 0406 02/10/19 2130   Prevent or Manage Infection   Fever Reduction/Comfort Measures medication administered --    Infection Management --  aseptic technique maintained

## 2019-02-11 NOTE — PROGRESS NOTES
"[facility]  Progress Note    Patient Name: Lior Prado  MRN: [id number]  Patient Class: IP- Inpatient   Admission Date: 1/28/2019  Length of Stay: 14 days  Attending Physician: Steph Guillen MD  Primary Care Provider: Primary Doctor No        Subjective:     Principal Problem:AIDS    HPI:  Mr. Donnelly is a 42 yo M with no reported past medical history- presents with a CC of weakness and some SOB for one week. He notes that he was treated with abx for a "pneumonia" 1/13-1/23.  He presents to the ED and is found to have thrush. Further, his HIV is +. The patient was started on treatment for PJP pneumonia and ID was consulted. The patient is thin and ill appearing.      Hospital Course:  Mr. Donnelly presented with shortness of breath and was admitted for treatment of PJP pneumonia and ID was consulted. The patient had a CTA of his chest that was negative for PE but was remarkable for a 1.8 cm irregular, cavitary opacity within the anterior segment of the right upper lobe, innumerable randomly distributed bilateral pulmonary nodules, patchy areas of subsegmental consolidation and nodular thickening of the right major and minor fissures, and mediastinal adenopathy.  Patient was started on empiric IV Bactrim and steroids.  He was transferred to the ICU due to high O2 requirements, rising lactate, and marginal blood pressures.  Pancytopenia was also present and worsened.  He was transfused 1 unit of platelets.  His blood pressures responded to IV fluid to alone and he did not require vasopressors. Pulmonology consulted for possible bronch in new diagnosis of HIV, and concern for PJP.  ID was also consulted.  On 1/30, CD4 count resulted at 11.  ID recommended bactrim and rocephin for probable PJP and CAP, respectively. Diflucan for oral candida. Pancytopenia likely due to sepsis, possible opportunistic infections and AIDS. Transaminases and lactate/LDH rising. On 1/31, " patient underwent bronchoscopy and remained on the ventilator postprocedure.  He was later extubated on 2/01 with use of BiPAP p.r.n. Rapid AFB stain resulted negative and was taken off isolation on 2/2.  His oxygen requirements continued to decline and liver function tests continue to improve.  He was stable for transfer to floor on 2/3. Oxygen requirements improving with intermittent doses of IV furosemide 20 mg for 3 days.     Interval History: feels much better today. Tachycardia has improved.     Review of Systems   Respiratory: Negative for shortness of breath.    Cardiovascular: Negative for palpitations.   Gastrointestinal: Negative.      Objective:     Vital Signs (Most Recent):  Temp: 98 °F (36.7 °C) (02/11/19 1546)  Pulse: 107 (02/11/19 1546)  Resp: 18 (02/11/19 1546)  BP: (!) 106/58 (02/11/19 1546)  SpO2: 95 % (02/11/19 1546) Vital Signs (24h Range):  Temp:  [98 °F (36.7 °C)-98.6 °F (37 °C)] 98 °F (36.7 °C)  Pulse:  [107-120] 107  Resp:  [17-20] 18  SpO2:  [95 %-98 %] 95 %  BP: (101-107)/(56-61) 106/58     Weight: 53.9 kg (118 lb 13.3 oz)  Body mass index is 19.18 kg/m².    Intake/Output Summary (Last 24 hours) at 2/11/2019 1724  Last data filed at 2/11/2019 0500  Gross per 24 hour   Intake 240 ml   Output 850 ml   Net -610 ml      Physical Exam   Constitutional: He is oriented to person, place, and time. He appears well-developed. No distress.   Cachectic, [place]i-speaking only   HENT:   + temporal wasting   Eyes: No scleral icterus.   Cardiovascular: Regular rhythm and normal heart sounds.   No murmur heard.  tachycardia   Pulmonary/Chest: Effort normal and breath sounds normal. No respiratory distress. He has no wheezes. He has no rhonchi. He has no rales. He exhibits no tenderness.   Abdominal: Soft. Bowel sounds are normal. There is no hepatosplenomegaly. There is no tenderness. There is no guarding.   Musculoskeletal: Normal range of motion. He exhibits no edema.   Neurological: He is alert and  oriented to person, place, and time.   Skin: No rash noted. He is not diaphoretic. No erythema.   Nursing note and vitals reviewed.      Significant Labs: All pertinent labs within the past 24 hours have been reviewed.    Significant Imaging: I have reviewed all pertinent imaging results/findings within the past 24 hours.  I have reviewed and interpreted all pertinent imaging results/findings within the past 24 hours.    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 However, histoplasma workup positive therefore there is a question about this being histo rather than PJP. Itrconazole initiated pending GMS stain of cytology specimen (requested by ID)  Aspergillus antigen positive. Cross reactive?   Plan to initiate antiretroviral therapy as outpatient once further testing done. However, patient states he does not want to initiate antiretroviral therapy because it is toxic. I spent > 5 min discussing the dangers of not treating HIV/AIDS (in Greek). Verbalized understanding but still wants to antiretroviral therapy.   Patient is undocumented, and resources will be difficult.    consulted for community resources. ID recommends f/u at [facility] with Dr Shine where there is Greek speaking staff. I discussed this at [facility]    Of note, patient now states he will stay in [place] and is motivated to f/u at [facility]     Acute hypoxemic respiratory failure    Secondary to probable  PJP  TB has been ruled out  Pulmonary following and patient is status post bronch  Extubated on 2/1  Stable at room air but still tachycardic.Hold off on scheduled nebs     Pneumonia, unspecified organism    Presumed PJP insetting of AIDS  Rocephin discontinued by ID  Bactrim now transitioned to PO. On prednisone. Tapering             Pancytopenia    Secondary to AIDS and/or medication induced  Thrombocytopenia 12k s/p transfusion of 1 unit platelets prior to bronch  Appreciate Heme-Onc input  Platelets overall stable  Will continue to monitor     Elevated LFTs    Likely due to sepsis and/or AIDS  Hepatitis panel negative  No abdominal pain and bilirubin not suggesting obstructive pattern or HIV cholangiopathy   U/S with fatty liver   LFTs are improving, will continue to monitor         Malnutrition of moderate degree    Pt did not comment on changes in diet  Due to AIDS  Will supplement nutrition with boost glucose control     Tobacco abuse    Smoking cessation education with > 3 min of counseling  Nicotine patch offered        Hyponatremia    Improved with IVF  Will continue monitor.     Weakness    Much better. Independent       Thrush    Thrush no longer present  Fluconazole discontinued       VTE Risk Mitigation (From admission, onward)        Ordered     IP VTE LOW RISK PATIENT  Once      01/28/19 1927     Place DENY hose  Until discontinued      01/28/19 1927          Dispo: tentative discharge tomorrow pending finalized ID recs. Patient requesting assistance with cost of medications.     Steph Edmondson MD  Department of Hospital Medicine   [facility] [place]

## 2019-02-12 LAB
ALBUMIN SERPL BCP-MCNC: 2.3 G/DL
ALP SERPL-CCNC: 222 U/L
ALT SERPL W/O P-5'-P-CCNC: 90 U/L
ANION GAP SERPL CALC-SCNC: 6 MMOL/L
AST SERPL-CCNC: 32 U/L
BILIRUB SERPL-MCNC: 0.3 MG/DL
BUN SERPL-MCNC: 14 MG/DL
CALCIUM SERPL-MCNC: 8.1 MG/DL
CHLORIDE SERPL-SCNC: 95 MMOL/L
CO2 SERPL-SCNC: 25 MMOL/L
CREAT SERPL-MCNC: 0.6 MG/DL
EST. GFR  (AFRICAN AMERICAN): >60 ML/MIN/1.73 M^2
EST. GFR  (NON AFRICAN AMERICAN): >60 ML/MIN/1.73 M^2
GLUCOSE SERPL-MCNC: 89 MG/DL
POTASSIUM SERPL-SCNC: 4.3 MMOL/L
PROT SERPL-MCNC: 5.8 G/DL
SODIUM SERPL-SCNC: 126 MMOL/L

## 2019-02-12 PROCEDURE — 94761 N-INVAS EAR/PLS OXIMETRY MLT: CPT

## 2019-02-12 PROCEDURE — 97110 THERAPEUTIC EXERCISES: CPT

## 2019-02-12 PROCEDURE — 25000003 PHARM REV CODE 250: Performed by: INTERNAL MEDICINE

## 2019-02-12 PROCEDURE — 21400001 HC TELEMETRY ROOM

## 2019-02-12 PROCEDURE — 80053 COMPREHEN METABOLIC PANEL: CPT

## 2019-02-12 PROCEDURE — 97116 GAIT TRAINING THERAPY: CPT

## 2019-02-12 PROCEDURE — 25000003 PHARM REV CODE 250: Performed by: EMERGENCY MEDICINE

## 2019-02-12 PROCEDURE — 63600175 PHARM REV CODE 636 W HCPCS: Performed by: INTERNAL MEDICINE

## 2019-02-12 PROCEDURE — 36415 COLL VENOUS BLD VENIPUNCTURE: CPT

## 2019-02-12 PROCEDURE — 99233 PR SUBSEQUENT HOSPITAL CARE,LEVL III: ICD-10-PCS | Mod: ,,, | Performed by: NURSE PRACTITIONER

## 2019-02-12 PROCEDURE — 25000003 PHARM REV CODE 250: Performed by: NURSE PRACTITIONER

## 2019-02-12 PROCEDURE — 99233 SBSQ HOSP IP/OBS HIGH 50: CPT | Mod: ,,, | Performed by: NURSE PRACTITIONER

## 2019-02-12 RX ADMIN — SULFAMETHOXAZOLE AND TRIMETHOPRIM 1 TABLET: 800; 160 TABLET ORAL at 09:02

## 2019-02-12 RX ADMIN — ITRACONAZOLE 200 MG: 100 CAPSULE, COATED PELLETS ORAL at 09:02

## 2019-02-12 RX ADMIN — FAMOTIDINE 20 MG: 20 TABLET ORAL at 09:02

## 2019-02-12 RX ADMIN — DILTIAZEM HYDROCHLORIDE 30 MG: 30 TABLET, FILM COATED ORAL at 06:02

## 2019-02-12 RX ADMIN — PREDNISONE 20 MG: 20 TABLET ORAL at 09:02

## 2019-02-12 NOTE — SUBJECTIVE & OBJECTIVE
Interval History: Afebrile, no leukocytosis. No acute events overnight.   Awaiting itraconazole levels.  Patient continues to feel good. Appetite good. Denies complaints      Review of Systems   Constitutional: Negative for activity change, chills and fever.   HENT: Negative.    Respiratory: Positive for shortness of breath (improved). Negative for cough.    Gastrointestinal: Negative for diarrhea, nausea and vomiting.   Genitourinary: Negative for difficulty urinating.   Musculoskeletal: Negative for back pain.   Skin: Negative for wound.   Neurological: Positive for weakness.   Psychiatric/Behavioral: Negative for agitation and confusion.     Objective:     Vital Signs (Most Recent):  Temp: 98.1 °F (36.7 °C) (02/12/19 0710)  Pulse: 99 (02/12/19 0710)  Resp: 18 (02/12/19 0710)  BP: (!) 106/58 (02/12/19 0710)  SpO2: (!) 94 % (02/12/19 0757) Vital Signs (24h Range):  Temp:  [98 °F (36.7 °C)-99.5 °F (37.5 °C)] 98.1 °F (36.7 °C)  Pulse:  [] 99  Resp:  [18] 18  SpO2:  [93 %-95 %] 94 %  BP: ()/(54-59) 106/58     Weight: 56.2 kg (123 lb 14.4 oz)  Body mass index is 20 kg/m².    Estimated Creatinine Clearance: 126.2 mL/min (based on SCr of 0.6 mg/dL).    Physical Exam   Constitutional: He appears cachectic. No distress.   HENT:   Head: Normocephalic and atraumatic.   Eyes: Conjunctivae are normal. No scleral icterus.   Neck: Normal range of motion. Neck supple.   Cardiovascular: Regular rhythm and normal heart sounds. Tachycardia present. Exam reveals no friction rub.   No murmur heard.  Pulmonary/Chest: Effort normal and breath sounds normal. No respiratory distress. He has no wheezes.   Fine crackles left base     Abdominal: Soft. He exhibits no distension. There is no tenderness. There is no guarding.   Musculoskeletal: He exhibits no tenderness.   Skin: Skin is warm. He is not diaphoretic. No erythema. No pallor.   Psychiatric: He has a normal mood and affect.   Vitals reviewed.      Significant Labs:    Blood Culture:   Recent Labs   Lab 01/28/19  1023 01/28/19  1028   LABBLOO No growth after 5 days. No growth after 5 days.     CBC:   No results for input(s): WBC, HGB, HCT, PLT in the last 48 hours.  CMP:   Recent Labs   Lab 02/11/19  0537 02/12/19  0539   * 126*   K 4.4 4.3   CL 96 95   CO2 24 25   GLU 94 89   BUN 27* 14   CREATININE 0.8 0.6   CALCIUM 8.4* 8.1*   PROT 6.2 5.8*   ALBUMIN 2.5* 2.3*   BILITOT 0.3 0.3   ALKPHOS 248* 222*   AST 26 32   ALT 75* 90*   ANIONGAP 7* 6*   EGFRNONAA >60 >60     Respiratory Culture:   Recent Labs   Lab 01/31/19  1035   GSRESP <10 epithelial cells per low power field.  Many WBC's  Many budding yeast   RESPIRATORYC No growth       Significant Imaging: I have reviewed all pertinent imaging results/findings within the past 24 hours.

## 2019-02-12 NOTE — ASSESSMENT & PLAN NOTE
44 y/o [place] speaking male with no significant medical history presents with progressively worsening SOB, cough, weakness and weight loss. Denied hemoptysis or sputum production. He was found to be positive for HIV with CD4 count of 11.  ID work up initiated:    Blood cultures 1/28 NGTD  CXR with concerns for PCP pneumonia vs pulmonary histoplasmosis.   CTA shows 1.8 cm irregular, cavitary opacity within the anterior segment of the right upper lobe  Pt now S/p bronchoscopy; AFB cultures NGTD, cytology not sent??  Fungitell positive  TB PCR NGTD; quant gold indeterminate; repeated yesterday  Crypto negative   C diff negative; stool culture negative  RPR/toxo negative  Repeat Aspergillus antigen positive, however, there can be cross reaction.    Urine histo positive  Fungal immunodiffusion assay - aspergillus negative, histoplasma positive      Given positive histoplasma in urine and fungal assay, suspect this may be pulmonary histoplasmosis rather than PJP.   Dr. Poon had called pathology to ask to run silver stain on cytology to r/o PCP and this is pending, but greater suspicion for histoplasmosis.  Patient completed loading dose of itraconazole.  Level still pending.   Patient reports he feels much better.  No complaints.    Plan:  1. Continue bactrim to 1 DS tablet daily for PJP ppx   2. Continue Itraconazole to 200 mg twice a day.   3. Follow up itraconazole level - no discharge until level resulted.   4. Recommend discontinuing famotidine as H2 blockers inhibit absorption of Itraconazole  5. Continue prednisone for now  6. Hold off on MAC prophylaxis for now but will need to start as outpatient given CD4 of 11  7. Will hold off on starting antiviral therapy for now as starting medication can cause risk of IRIS and pt not currently wanting to start HIV meds; pt will need f/u as outpatient; per Dr. Lali Shine at [facility] is [place] speaking and she may be a good provider for this patient to see  outpatient  8. Will follow up tomorrow. Hopefully itra level resulted and final recommendations can be made.    Data reviewed and plan discussed with ID staff  Discussed with Primary Team

## 2019-02-12 NOTE — PLAN OF CARE
Problem: Adult Inpatient Plan of Care  Goal: Plan of Care Review  Outcome: Ongoing (interventions implemented as appropriate)   02/12/19 1504   Plan of Care Review   Plan of Care Reviewed With patient   Progress improving       Problem: Infection  Goal: Infection Symptom Resolution    Intervention: Prevent or Manage Infection   02/12/19 1504   Prevent or Manage Infection   Fever Reduction/Comfort Measures fluid intake increased;lightweight clothing   Infection Management aseptic technique maintained         Problem: Infection (Pneumonia)  Goal: Resolution of Infection Signs/Symptoms    Intervention: Prevent Infection Progression   02/12/19 1504   Prevent or Manage Infection   Fever Reduction/Comfort Measures fluid intake increased;lightweight clothing   Infection Management aseptic technique maintained   Manage Diarrhea   Isolation Precautions protective environment maintained         Problem: Respiratory Compromise (Pneumonia)  Goal: Effective Oxygenation and Ventilation    Intervention: Optimize Oxygenation and Ventilation   02/12/19 1504   Prevent Additional Skin Injury   Head of Bed (HOB) HOB at 20-30 degrees;HOB at 30-45 degrees   Support Asthma Symptom Control   Airway/Ventilation Management airway patency maintained

## 2019-02-12 NOTE — PT/OT/SLP PROGRESS
Physical Therapy Treatment    Patient Name:  Lior Prado   MRN:  [id number]    Recommendations:     Discharge Recommendations:  (outpatient PT services)   Discharge Equipment Recommendations: none   Barriers to discharge: None    Assessment:     Lior Prado is a 43 y.o. male admitted with a medical diagnosis of AIDS.  He presents with the following impairments/functional limitations:  weakness, decreased safety awareness, decreased lower extremity function, decreased upper extremity function, gait instability . Pt ambulated ~ 1000 ft with no AD,  MOD I . Pt O2 SATS : 96 % on RA with gait training. Pt tolerated treatment well.        Rehab Prognosis: Good; patient would benefit from acute skilled PT services to address these deficits and reach maximum level of function.    Recent Surgery: Procedure(s) (LRB):  Bronchoscopy (N/A) 12 Days Post-Op    Plan:     During this hospitalization, patient to be seen 3 x/week(M-F) to address the identified rehab impairments via gait training, therapeutic activities, therapeutic exercises and progress toward the following goals:    · Plan of Care Expires:  02/18/19    Subjective     Chief Complaint: none   Patient/Family Comments/goals: to get back to PLOF   Pain/Comfort:  · Pain Rating 1: 0/10  · Pain Rating Post-Intervention 1: 0/10      Objective:     Communicated with nurse Perez prior to session.  Patient found all lines intact, call button in reach and nurse notified telemetry  upon PT entry to room.     General Precautions: Standard, fall   Orthopedic Precautions:N/A   Braces: N/A     Functional Mobility:  · Bed Mobility:     · Supine to Sit: independence  · Transfers:     · Sit to Stand:  modified independence with no AD  · Gait: Pt ambulated ~ 1000 ft with no AD,  MOD I , no LOB .  Pt O2 SATS : 96 % on RA with gait training. VC's for safety awareness.          AM-PAC 6 CLICK MOBILITY  Turning over in bed (including adjusting bedclothes, sheets and  blankets)?: 4  Sitting down on and standing up from a chair with arms (e.g., wheelchair, bedside commode, etc.): 4  Moving from lying on back to sitting on the side of the bed?: 4  Moving to and from a bed to a chair (including a wheelchair)?: 4  Need to walk in hospital room?: 4  Climbing 3-5 steps with a railing?: 3  Basic Mobility Total Score: 23       Therapeutic Activities and Exercises:   pt performed bed mobility, transfer and gait training as above. '  Pt performed standing BLE ex's x 10 reps (BUE support on handrail ) : hip flexion/extension/ABD/ADD, marching in place, heel/toes raises, squat . Performed seated BUE x 10 reps with green thera-band : elbows flexion/ extension , shoulder  horizontal ABD/ADD, flexion /extension .   V/T cues for proper technique and sequence. Pt tolerated well.  Encouraged pt to perform BUE&BLE ex's per handout throughout the day. Pt verbalize understanding.      Patient left seated EOB with all lines intact, call button in reach and nurse notified..    GOALS:   Multidisciplinary Problems     Physical Therapy Goals        Problem: Physical Therapy Goal    Goal Priority Disciplines Outcome Goal Variances Interventions   Physical Therapy Goal     PT, PT/OT Ongoing (interventions implemented as appropriate)     Description:  Goals to be met by: 19     Patient will increase functional independence with mobility by performin. Supine to sit with Hodgeman  2. Rolling to Left and Right with Hodgeman  3. Sit to stand transfer with Hodgeman  4. Bed to chair transfer with Hodgeman   5. Gait  >250 feet with Hodgeman   6. Upper/Lower extremity exercise program 3 sets x10 reps per handout, with independence                      Time Tracking:     PT Received On: 19  PT Start Time: 1510     PT Stop Time: 1535  PT Total Time (min): 25 min     Billable Minutes: Gait Training 10 and Therapeutic Exercise 15    Treatment Type: Treatment  PT/PTA: PTA     PTA  Visit Number: 1     Norma Morales, PTA  02/12/2019

## 2019-02-12 NOTE — NURSING
End of shift bedside report given to SANDOVAL Hastings. Patient in no apparent distress.     12 hour chart check complete.

## 2019-02-12 NOTE — PLAN OF CARE
Problem: Physical Therapy Goal  Goal: Physical Therapy Goal  Goals to be met by: 19     Patient will increase functional independence with mobility by performin. Supine to sit with Crawford  2. Rolling to Left and Right with Crawford  3. Sit to stand transfer with Crawford  4. Bed to chair transfer with Crawford   5. Gait  >250 feet with Crawford   6. Upper/Lower extremity exercise program 3 sets x10 reps per handout, with independence     Outcome: Ongoing (interventions implemented as appropriate)  Pt ambulated ~ 1000 ft with no AD,  MOD I . Pt O2 SATS : 96 % on RA with gait training. Pt tolerated treatment well.

## 2019-02-12 NOTE — PROGRESS NOTES
[facility] [place]  Infectious Disease  Progress Note    Patient Name: Lior Prado  MRN: [id number]  Admission Date: 1/28/2019  Length of Stay: 15 days  Attending Physician: Nhung Carter MD  Primary Care Provider: Primary Doctor No    Isolation Status: No active isolations  Assessment/Plan:      * AIDS       44 y/o Greek speaking male with no significant medical history presents with progressively worsening SOB, cough, weakness and weight loss. Denied hemoptysis or sputum production. He was found to be positive for HIV with CD4 count of 11.  ID work up initiated:    Blood cultures 1/28 NGTD  CXR with concerns for PCP pneumonia vs pulmonary histoplasmosis.   CTA shows 1.8 cm irregular, cavitary opacity within the anterior segment of the right upper lobe  Pt now S/p bronchoscopy; AFB cultures NGTD, cytology not sent??  Fungitell positive  TB PCR NGTD; quant gold indeterminate; repeated yesterday  Crypto negative   C diff negative; stool culture negative  RPR/toxo negative  Repeat Aspergillus antigen positive, however, there can be cross reaction.    Urine histo positive  Fungal immunodiffusion assay - aspergillus negative, histoplasma positive      Given positive histoplasma in urine and fungal assay, suspect this may be pulmonary histoplasmosis rather than PJP.   Dr. Poon had called pathology to ask to run silver stain on cytology to r/o PCP and this is pending, but greater suspicion for histoplasmosis.  Patient completed loading dose of itraconazole.  Level still pending.   Patient reports he feels much better.  No complaints.    Plan:  1. Continue bactrim to 1 DS tablet daily for PJP ppx   2. Continue Itraconazole to 200 mg twice a day.   3. Follow up itraconazole level - no discharge until level resulted.   4. Recommend discontinuing famotidine as H2 blockers inhibit absorption of Itraconazole  5. Continue prednisone for now  6. Hold off on MAC prophylaxis for now but will need to start  as outpatient given CD4 of 11  7. Will hold off on starting antiviral therapy for now as starting medication can cause risk of IRIS and pt not currently wanting to start HIV meds; pt will need f/u as outpatient; per Dr[facility] Lali Shine at [facility] is Yor[place] speaking and she may be a good provider for this patient to see outpatient  8. Will follow up tomorrow. Hopefully itra level resulted and final recommendations can be made.    Data reviewed and plan discussed with ID staff  Discussed with Primary Team       Thank you.   Please call for any questions or concerns.  SYLVIE Skelton, ANP-C  [phone]    Subjective:     Principal Problem:AIDS    HPI: 44 y/o Citizen of [place] speaking male with no significant medical history presents with progressively worsening SOB and weakness for the past week. Associated symptoms include dizziness. He reports having a cough (mostly at nights) for the past week. Denies hemoptysis or sputum production. He denies having any fever chills or night sweats at home. No n/v/d,dysuria. He reports having dyspnea on exertion and 6kg weight loss. On admission he was found to be febrile and significantly diaphoretic. His HIV rapid test is positive. Concerns for PJP pneumonia. He is thin and ill appearing.    He works as a . He denies incarceration or homelessness. He denies knowing about HIV diagnosis. He smokes, denies IVDU or illicit drug use. He denies exposure to TB. No significant allergies at this time.     Blood cultures NGTD  He is febrile 101 and tachycardic  Interval History: Afebrile, no leukocytosis. No acute events overnight.   Awaiting itraconazole levels.  Patient continues to feel good. Appetite good. Denies complaints      Review of Systems   Constitutional: Negative for activity change, chills and fever.   HENT: Negative.    Respiratory: Positive for shortness of breath (improved). Negative for cough.    Gastrointestinal: Negative for diarrhea, nausea and  vomiting.   Genitourinary: Negative for difficulty urinating.   Musculoskeletal: Negative for back pain.   Skin: Negative for wound.   Neurological: Positive for weakness.   Psychiatric/Behavioral: Negative for agitation and confusion.     Objective:     Vital Signs (Most Recent):  Temp: 98.1 °F (36.7 °C) (02/12/19 0710)  Pulse: 99 (02/12/19 0710)  Resp: 18 (02/12/19 0710)  BP: (!) 106/58 (02/12/19 0710)  SpO2: (!) 94 % (02/12/19 0757) Vital Signs (24h Range):  Temp:  [98 °F (36.7 °C)-99.5 °F (37.5 °C)] 98.1 °F (36.7 °C)  Pulse:  [] 99  Resp:  [18] 18  SpO2:  [93 %-95 %] 94 %  BP: ()/(54-59) 106/58     Weight: 56.2 kg (123 lb 14.4 oz)  Body mass index is 20 kg/m².    Estimated Creatinine Clearance: 126.2 mL/min (based on SCr of 0.6 mg/dL).    Physical Exam   Constitutional: He appears cachectic. No distress.   HENT:   Head: Normocephalic and atraumatic.   Eyes: Conjunctivae are normal. No scleral icterus.   Neck: Normal range of motion. Neck supple.   Cardiovascular: Regular rhythm and normal heart sounds. Tachycardia present. Exam reveals no friction rub.   No murmur heard.  Pulmonary/Chest: Effort normal and breath sounds normal. No respiratory distress. He has no wheezes.   Fine crackles left base     Abdominal: Soft. He exhibits no distension. There is no tenderness. There is no guarding.   Musculoskeletal: He exhibits no tenderness.   Skin: Skin is warm. He is not diaphoretic. No erythema. No pallor.   Psychiatric: He has a normal mood and affect.   Vitals reviewed.      Significant Labs:   Blood Culture:   Recent Labs   Lab 01/28/19  1023 01/28/19  1028   LABBLOO No growth after 5 days. No growth after 5 days.     CBC:   No results for input(s): WBC, HGB, HCT, PLT in the last 48 hours.  CMP:   Recent Labs   Lab 02/11/19  0537 02/12/19  0539   * 126*   K 4.4 4.3   CL 96 95   CO2 24 25   GLU 94 89   BUN 27* 14   CREATININE 0.8 0.6   CALCIUM 8.4* 8.1*   PROT 6.2 5.8*   ALBUMIN 2.5* 2.3*    BILITOT 0.3 0.3   ALKPHOS 248* 222*   AST 26 32   ALT 75* 90*   ANIONGAP 7* 6*   EGFRNONAA >60 >60     Respiratory Culture:   Recent Labs   Lab 01/31/19  1035   GSRESP <10 epithelial cells per low power field.  Many WBC's  Many budding yeast   RESPIRATORYC No growth       Significant Imaging: I have reviewed all pertinent imaging results/findings within the past 24 hours.

## 2019-02-12 NOTE — PROGRESS NOTES
"[facility]  Adult Nutrition  Progress Note    SUMMARY       Recommendations    1. Encourage adequate intake of meals & oral nutr supplement daily  Goals: Maintain meal intake >50% daily  Nutrition Goal Status: goal met  Communication of RD Recs: (plan of care)    Reason for Assessment    Reason For Assessment: RD follow-up  Diagnosis: (AIDS)  Relevant Medical History: none noted  Interdisciplinary Rounds: did not attend  General Information Comments: Pt seen this PM eating lunch; pt's meal intake has consistently been 100%; he is also drinking Boost Plus. Pt w/ desired wt gain of 7# x 1 week. NFPE performed   Nutrition Discharge Planning: Adequate intake of meals to meet nutr needs    Nutrition Risk Screen    Nutrition Risk Screen: other (see comments)    Nutrition/Diet History    Spiritual, Cultural Beliefs, Sikh Practices, Values that Affect Care: no  Factors Affecting Nutritional Intake: None identified at this time    Anthropometrics    Temp: 98.1 °F (36.7 °C)  Height Method: Stated  Height: 5' 6" (167.6 cm)  Height (inches): 66 in  Weight Method: Bed Scale  Weight: 56.2 kg (123 lb 14.4 oz)  Weight (lb): 123.9 lb  Ideal Body Weight (IBW), Male: 142 lb  % Ideal Body Weight, Male (lb): 87.25 lb  BMI (Calculated): 20  BMI Grade: 18.5-24.9 - normal  Usual Body Weight (UBW), kg: (Unable to obtain)       Lab/Procedures/Meds    Pertinent Labs Reviewed: reviewed  Pertinent Labs Comments: Na 126, Alb 2.3  Pertinent Medications Reviewed: reviewed  Pertinent Medications Comments: prednisone    Estimated/Assessed Needs    Weight Used For Calorie Calculations: 56.2 kg (123 lb 14.4 oz)  Energy Calorie Requirements (kcal): 1967-2248  Energy Need Method: Kcal/kg(35-40)     Protein Requirements: 73-84 g (1.3-1.5 g/kg)  Weight Used For Protein Calculations: 56.2 kg (123 lb 14.4 oz)     Estimated Fluid Requirement Method: RDA Method  RDA Method (mL): 1967         Nutrition Prescription Ordered    Current " Diet Order: Regular  Oral Nutrition Supplement: Boost Plus TID    Evaluation of Received Nutrient/Fluid Intake    IV Fluid (mL): (discontinued)  I/O: reviewed  Energy Calories Required: meeting needs  Protein Required: meeting needs  Fluid Required: meeting needs  Comments: LBM 2/12  Tolerance: tolerating  % Intake of Estimated Energy Needs: 75 - 100 %  % Meal Intake: 75 - 100 %    Nutrition Risk    Level of Risk/Frequency of Follow-up: (F/u 1 x weekly)     Assessment and Plan    Malnutrition of moderate degree    Malnutrition in the context of Acute Illness/Injury    Related to (etiology):  New dx of HIV    Signs and Symptoms (as evidenced by):  Body Fat Depletion: mild depletion of orbitals, triceps and thoracic and lumbar region   Muscle Mass Depletion: mild and moderate depletion of temples, clavicle region, interosseous muscle and lower extremities     Interventions/Recommendations (treatment strategy):  Commercial beverage    Nutrition Diagnosis Status:  Improving              Monitor and Evaluation    Food and Nutrient Intake: energy intake, food and beverage intake  Food and Nutrient Adminstration: diet order  Physical Activity and Function: nutrition-related ADLs and IADLs  Anthropometric Measurements: weight, weight change  Biochemical Data, Medical Tests and Procedures: electrolyte and renal panel, glucose/endocrine profile, inflammatory profile  Nutrition-Focused Physical Findings: overall appearance     Malnutrition Assessment  Malnutrition Type: acute illness or injury              Orbital Region (Subcutaneous Fat Loss): mild depletion  Upper Arm Region (Subcutaneous Fat Loss): mild depletion  Thoracic and Lumbar Region: mild depletion   [place] Region (Muscle Loss): mild depletion  Clavicle Bone Region (Muscle Loss): moderate depletion  Clavicle and Acromion Bone Region (Muscle Loss): moderate depletion  Dorsal Hand (Muscle Loss): mild depletion  Patellar Region (Muscle Loss): mild depletion  Anterior  Thigh Region (Muscle Loss): mild depletion  Posterior Calf Region (Muscle Loss): mild depletion       Subcutaneous Fat Loss (Final Summary): moderate protein-calorie malnutrition  Muscle Loss Evaluation (Final Summary): moderate protein-calorie malnutrition         Nutrition Follow-Up    RD Follow-up?: Yes

## 2019-02-12 NOTE — ASSESSMENT & PLAN NOTE
Malnutrition in the context of Acute Illness/Injury    Related to (etiology):  New dx of HIV    Signs and Symptoms (as evidenced by):  Body Fat Depletion: mild depletion of orbitals, triceps and thoracic and lumbar region   Muscle Mass Depletion: mild and moderate depletion of temples, clavicle region, interosseous muscle and lower extremities     Interventions/Recommendations (treatment strategy):  Commercial beverage    Nutrition Diagnosis Status:  Improving

## 2019-02-12 NOTE — PLAN OF CARE
Problem: Fall Injury Risk  Goal: Absence of Fall and Fall-Related Injury  Outcome: Ongoing (interventions implemented as appropriate)  Intervention: Identify and Manage Contributors to Fall Injury Risk   02/12/19 0314   Manage Acute Allergic Reaction   Medication Review/Management medications reviewed   Identify and Manage Contributors to Fall Injury Risk   Self-Care Promotion independence encouraged;BADL personal objects within reach;BADL personal routines maintained         Problem: HIV/AIDS Infection  Goal: HIV/AIDS Symptom Control    Intervention: Support Life Quality   02/12/19 0314   Promote Anxiety Reduction   Supportive Measures active listening utilized;relaxation techniques promoted   Promote Advance Care Planning   Life Transition/Adjustment decision-making facilitated         Problem: Infection  Goal: Infection Symptom Resolution    Intervention: Prevent or Manage Infection   02/12/19 0314   Prevent or Manage Infection   Fever Reduction/Comfort Measures lightweight bedding;lightweight clothing   Infection Management aseptic technique maintained         Problem: Skin Injury Risk Increased  Goal: Skin Health and Integrity  Outcome: Ongoing (interventions implemented as appropriate)  Intervention: Optimize Skin Protection   02/12/19 0314   Prevent Additional Skin Injury   Head of Bed (HOB) HOB at 30-45 degrees   Pressure Reduction Devices pressure-redistributing mattress utilized   Pressure Reduction Techniques rest period provided between sit times;frequent weight shift encouraged   Monitor and Manage Hypervolemia   Skin Protection tubing/devices free from skin contact

## 2019-02-12 NOTE — PROGRESS NOTES
[facility] [place]  Infectious Disease  Progress Note    Patient Name: Lior Prado  MRN: [id number]  Admission Date: 1/28/2019  Length of Stay: 14 days  Attending Physician: Steph Guillen MD  Primary Care Provider: Primary Doctor No    Isolation Status: No active isolations  Assessment/Plan:      * AIDS       42 y/o Irish speaking male with no significant medical history presents with progressively worsening SOB, cough, weakness and weight loss. Denied hemoptysis or sputum production. He was found to be positive for HIV with CD4 count of 11.  ID work up initiated:    Blood cultures 1/28 NGTD  CXR with concerns for PCP pneumonia vs pulmonary histoplasmosis.   CTA shows 1.8 cm irregular, cavitary opacity within the anterior segment of the right upper lobe  Pt now S/p bronchoscopy; AFB cultures NGTD, cytology not sent??  Fungitell positive  TB PCR NGTD; quant gold indeterminate; repeated yesterday  Crypto negative  c diff negative; stool culture negative  RPR/toxo negative  Repeat Aspergillus antigen positive, however, there can be cross reaction.    Urine histo positive  Fungal immunodiffusion assay - aspergillus negative, histoplasma positive      Given positive histoplasma in urine and fungal assay, suspect this may be pulmonary histoplasmosis rather than PJP.   Dr. Poon had called pathology to ask to run silver stain on cytology to r/o PCP and this is pending, but greater suspicion for histoplasmosis.  Patient completed loading dose of itraconazole.  Level pending.     Plan:  1. Decrease bactrim to 1 DS tablet daily for PJP ppx   2. Decrease Itraconazole to 200 mg twice a day.   3. Follow up itraconazole level - no discharge until level resulted.   4. Continue prednisone for now  5. Hold off on MAC prophylaxis for now but will need to start as outpatient given CD4 of 11  6. Will hold off on starting antiviral therapy for now as starting medication can cause risk of IRIS and pt not  currently wanting to start HIV meds; pt will need f/u as outpatient; per Dr. Lali Shine at [facility] is [place]ian speaking and she may be a good provider for this patient to see outpatient  7. Will follow up tomorrow    Data reviewed and plan discussed with ID staff  Discussed with Primary Team       Thank you.   Please call for any questions or concerns.  SYLVIE Skelton, ANP-C  [phone]    Subjective:     Principal Problem:AIDS    HPI: 44 y/o Tajik speaking male with no significant medical history presents with progressively worsening SOB and weakness for the past week. Associated symptoms include dizziness. He reports having a cough (mostly at nights) for the past week. Denies hemoptysis or sputum production. He denies having any fever chills or night sweats at home. No n/v/d,dysuria. He reports having dyspnea on exertion and 6kg weight loss. On admission he was found to be febrile and significantly diaphoretic. His HIV rapid test is positive. Concerns for PJP pneumonia. He is thin and ill appearing.    He works as a . He denies incarceration or homelessness. He denies knowing about HIV diagnosis. He smokes, denies IVDU or illicit drug use. He denies exposure to TB. No significant allergies at this time.     Blood cultures NGTD  He is febrile 101 and tachycardic  Interval History: Afebrile, no leukocytosis. Looks very well today, cheerful.   Fungal immunodiffusion assay showing aspergillus negative, histoplasma positive.  Suspect pulmonary histoplasmosis rather than PJP.   Patient now more willing to consider antiretroviral therapy.       Review of Systems   Constitutional: Negative for activity change, chills and fever.   HENT: Negative.    Respiratory: Positive for cough (improved) and shortness of breath (improved).    Gastrointestinal: Negative for diarrhea, nausea and vomiting.   Skin: Negative for wound.   Neurological: Positive for weakness.     Objective:     Vital Signs  (Most Recent):  Temp: 98.6 °F (37 °C) (02/11/19 0705)  Pulse: (!) 113 (02/11/19 0705)  Resp: 18 (02/11/19 0705)  BP: (!) 107/58 (02/11/19 0705)  SpO2: 96 % (02/11/19 0816) Vital Signs (24h Range):  Temp:  [98 °F (36.7 °C)-99.4 °F (37.4 °C)] 98.6 °F (37 °C)  Pulse:  [108-126] 113  Resp:  [17-20] 18  SpO2:  [94 %-98 %] 96 %  BP: ()/(56-71) 107/58     Weight: 53.9 kg (118 lb 13.3 oz)  Body mass index is 19.18 kg/m².    Estimated Creatinine Clearance: 90.8 mL/min (based on SCr of 0.8 mg/dL).    Physical Exam   Constitutional: He appears cachectic. No distress.   Thin  Sitting up in bed, on phone, very cheerful    HENT:   Head: Normocephalic.   Eyes: Conjunctivae are normal. No scleral icterus.   Cardiovascular: Regular rhythm and normal heart sounds. Tachycardia present. Exam reveals no friction rub.   No murmur heard.  Pulmonary/Chest: Effort normal and breath sounds normal. No stridor. No respiratory distress. He has no wheezes.   Abdominal: Soft. He exhibits no distension. There is no tenderness. There is no guarding.   Musculoskeletal: He exhibits no tenderness.   Skin: Skin is warm. He is not diaphoretic. No erythema. No pallor.   Psychiatric: He has a normal mood and affect.   Vitals reviewed.      Significant Labs:   Blood Culture:   Recent Labs   Lab 01/28/19  1023 01/28/19  1028   LABBLOO No growth after 5 days. No growth after 5 days.     CBC:   Recent Labs   Lab 02/10/19  0438   WBC 6.26   HGB 10.3*   HCT 31.1*   PLT 73*     CMP:   Recent Labs   Lab 02/10/19  0438 02/11/19  0537   * 127*   K 4.7 4.4   CL 94* 96   CO2 22* 24   GLU 85 94   BUN 25* 27*   CREATININE 0.7 0.8   CALCIUM 8.6* 8.4*   PROT 6.7 6.2   ALBUMIN 2.7* 2.5*   BILITOT 0.3 0.3   ALKPHOS 238* 248*   AST 22 26   ALT 69* 75*   ANIONGAP 10 7*   EGFRNONAA >60 >60     Respiratory Culture:   Recent Labs   Lab 01/31/19  1035   GSRESP <10 epithelial cells per low power field.  Many WBC's  Many budding yeast   RESPIRATORYC No growth        Significant Imaging: I have reviewed all pertinent imaging results/findings within the past 24 hours.

## 2019-02-13 VITALS
RESPIRATION RATE: 18 BRPM | DIASTOLIC BLOOD PRESSURE: 60 MMHG | WEIGHT: 122.81 LBS | SYSTOLIC BLOOD PRESSURE: 107 MMHG | HEIGHT: 66 IN | TEMPERATURE: 99 F | HEART RATE: 112 BPM | BODY MASS INDEX: 19.74 KG/M2 | OXYGEN SATURATION: 94 %

## 2019-02-13 LAB
ALBUMIN SERPL BCP-MCNC: 2.1 G/DL
ALP SERPL-CCNC: 265 U/L
ALT SERPL W/O P-5'-P-CCNC: 114 U/L
ANION GAP SERPL CALC-SCNC: 5 MMOL/L
AST SERPL-CCNC: 39 U/L
BILIRUB SERPL-MCNC: 0.3 MG/DL
BUN SERPL-MCNC: 10 MG/DL
CALCIUM SERPL-MCNC: 7.9 MG/DL
CHLORIDE SERPL-SCNC: 99 MMOL/L
CO2 SERPL-SCNC: 26 MMOL/L
CREAT SERPL-MCNC: 0.6 MG/DL
EST. GFR  (AFRICAN AMERICAN): >60 ML/MIN/1.73 M^2
EST. GFR  (NON AFRICAN AMERICAN): >60 ML/MIN/1.73 M^2
GLUCOSE SERPL-MCNC: 97 MG/DL
ITRACONAZ SERPL-MCNC: 0.2 MCG/ML
OH-ITRACONAZ SERPL-MCNC: 0.3 MCG/ML
POTASSIUM SERPL-SCNC: 3.4 MMOL/L
PROT SERPL-MCNC: 5.4 G/DL
SODIUM SERPL-SCNC: 130 MMOL/L

## 2019-02-13 PROCEDURE — 99233 PR SUBSEQUENT HOSPITAL CARE,LEVL III: ICD-10-PCS | Mod: ,,, | Performed by: NURSE PRACTITIONER

## 2019-02-13 PROCEDURE — 80053 COMPREHEN METABOLIC PANEL: CPT

## 2019-02-13 PROCEDURE — 36415 COLL VENOUS BLD VENIPUNCTURE: CPT

## 2019-02-13 PROCEDURE — 99233 SBSQ HOSP IP/OBS HIGH 50: CPT | Mod: ,,, | Performed by: NURSE PRACTITIONER

## 2019-02-13 PROCEDURE — 25000003 PHARM REV CODE 250: Performed by: NURSE PRACTITIONER

## 2019-02-13 PROCEDURE — 63600175 PHARM REV CODE 636 W HCPCS: Performed by: INTERNAL MEDICINE

## 2019-02-13 RX ORDER — PREDNISONE 10 MG/1
TABLET ORAL
Qty: 5 TABLET | Refills: 0 | Status: SHIPPED | OUTPATIENT
Start: 2019-02-13

## 2019-02-13 RX ORDER — SULFAMETHOXAZOLE AND TRIMETHOPRIM 800; 160 MG/1; MG/1
1 TABLET ORAL DAILY
Qty: 30 TABLET | Refills: 11 | Status: SHIPPED | OUTPATIENT
Start: 2019-02-14 | End: 2019-03-16

## 2019-02-13 RX ORDER — ITRACONAZOLE 100 MG/1
200 CAPSULE ORAL 2 TIMES DAILY
Qty: 120 CAPSULE | Refills: 3 | Status: SHIPPED | OUTPATIENT
Start: 2019-02-13 | End: 2019-03-15

## 2019-02-13 RX ADMIN — ITRACONAZOLE 200 MG: 100 CAPSULE, COATED PELLETS ORAL at 08:02

## 2019-02-13 RX ADMIN — SULFAMETHOXAZOLE AND TRIMETHOPRIM 1 TABLET: 800; 160 TABLET ORAL at 08:02

## 2019-02-13 RX ADMIN — PREDNISONE 20 MG: 20 TABLET ORAL at 08:02

## 2019-02-13 NOTE — PLAN OF CARE
Problem: Fall Injury Risk  Goal: Absence of Fall and Fall-Related Injury  Outcome: Ongoing (interventions implemented as appropriate)  Intervention: Identify and Manage Contributors to Fall Injury Risk   02/13/19 1700   Manage Acute Allergic Reaction   Medication Review/Management medications reviewed   Identify and Manage Contributors to Fall Injury Risk   Self-Care Promotion independence encouraged;BADL personal objects within reach;meal setup provided;safe use of adaptive equipment encouraged

## 2019-02-13 NOTE — PROGRESS NOTES
[facility]  Infectious Disease  Progress Note    Patient Name: Lior Prado  MRN: [id number]  Admission Date: 1/28/2019  Length of Stay: 16 days  Attending Physician: Nhung Carter MD  Primary Care Provider: Primary Doctor No    Isolation Status: No active isolations  Assessment/Plan:      * AIDS       42 y/o [place] speaking male with no significant medical history presents with progressively worsening SOB, cough, weakness and weight loss. Denied hemoptysis or sputum production. He was found to be positive for HIV with CD4 count of 11.  ID work up initiated:    Blood cultures 1/28 NGTD  CXR with concerns for PCP pneumonia vs pulmonary histoplasmosis.   CTA shows 1.8 cm irregular, cavitary opacity within the anterior segment of the right upper lobe  Pt s/p bronchoscopy; AFB cultures NGTD  TB PCR negative.   QG indeterminate X2  Fungitell positive  Crypto negative  C diff negative; stool culture negative  RPR/toxo negative  Repeat Aspergillus antigen positive, however, there can be cross reaction.    Urine histo positive  Fungal immunodiffusion assay - aspergillus negative, histoplasma positive      Given positive histoplasma in urine and fungal assay, suspect this is pulmonary histoplasmosis rather than PJP.   Dr. Poon had called pathology to ask to run silver stain on cytology to r/o PCP and this is pending, but greater suspicion for histoplasmosis and patient is now on itraconzole.     Patient completed loading dose of itraconazole.  Itraconazole level is .2 .       Patient reports he feels much better.  No complaints.    Plan/discharge recommendations:  1. Continue bactrim 1 DS tablet daily for PJP ppx   2. Continue Itraconazole 200 mg orally twice a day for histoplasma.  3. Recommend itraconazole level in 14 days.   4  Patient to establish as outpatient with Dr. Shine at [facility] within 14 days for HIV/HAART therapy.  Understand this has been arranged.  Dr. Shine may  contact Dr. Manas Poon, ID, [facility] [place] for any questions.      Data reviewed and plan discussed with ID staff  Discussed with Primary Team         Thank you.   Please call for any questions or concerns.  SYLVIE Skelton, ANP-C  [phone]    Subjective:     Principal Problem:AIDS    HPI: 42 y/o Dutch speaking male with no significant medical history presents with progressively worsening SOB and weakness for the past week. Associated symptoms include dizziness. He reports having a cough (mostly at nights) for the past week. Denies hemoptysis or sputum production. He denies having any fever chills or night sweats at home. No n/v/d,dysuria. He reports having dyspnea on exertion and 6kg weight loss. On admission he was found to be febrile and significantly diaphoretic. His HIV rapid test is positive. Concerns for PJP pneumonia. He is thin and ill appearing.    He works as a . He denies incarceration or homelessness. He denies knowing about HIV diagnosis. He smokes, denies IVDU or illicit drug use. He denies exposure to TB. No significant allergies at this time.     Blood cultures NGTD  He is febrile 101 and tachycardic  Interval History: Afebrile, no leukocytosis. No acute events overnight.   Awaiting itraconazole levels.  Patient continues to feel good. Appetite good. Denies complaints      Review of Systems   Constitutional: Negative for activity change, chills and fever.   HENT: Negative.    Respiratory: Positive for shortness of breath (improved). Negative for cough.    Gastrointestinal: Negative for diarrhea, nausea and vomiting.   Genitourinary: Negative for difficulty urinating.   Musculoskeletal: Negative for back pain.   Skin: Negative for wound.   Neurological: Positive for weakness.   Psychiatric/Behavioral: Negative for agitation and confusion.     Objective:     Vital Signs (Most Recent):  Temp: 98.8 °F (37.1 °C) (02/13/19 0711)  Pulse: 97 (02/13/19 0711)  Resp: 17 (02/13/19 0711)  BP: (!)  97/59 (02/13/19 0711)  SpO2: 95 % (02/13/19 0711) Vital Signs (24h Range):  Temp:  [98.1 °F (36.7 °C)-99.3 °F (37.4 °C)] 98.8 °F (37.1 °C)  Pulse:  [] 97  Resp:  [16-18] 17  SpO2:  [94 %-98 %] 95 %  BP: ()/(56-60) 97/59     Weight: 55.7 kg (122 lb 12.7 oz)  Body mass index is 19.82 kg/m².    Estimated Creatinine Clearance: 125.1 mL/min (based on SCr of 0.6 mg/dL).    Physical Exam   Constitutional: He appears cachectic. No distress.   HENT:   Head: Normocephalic and atraumatic.   Eyes: Conjunctivae are normal. No scleral icterus.   Neck: Normal range of motion. Neck supple.   Cardiovascular: Regular rhythm and normal heart sounds. Tachycardia present. Exam reveals no friction rub.   No murmur heard.  Pulmonary/Chest: Effort normal and breath sounds normal. No respiratory distress. He has no wheezes.   Fine crackles left base     Abdominal: Soft. He exhibits no distension. There is no tenderness. There is no guarding.   Musculoskeletal: He exhibits no tenderness.   Skin: Skin is warm. He is not diaphoretic. No erythema. No pallor.   Psychiatric: He has a normal mood and affect.   Vitals reviewed.      Significant Labs:   Blood Culture:   Recent Labs   Lab 01/28/19  1023 01/28/19  1028   LABBLOO No growth after 5 days. No growth after 5 days.     CBC:   No results for input(s): WBC, HGB, HCT, PLT in the last 48 hours.  CMP:   Recent Labs   Lab 02/12/19  0539 02/13/19  0513   * 130*   K 4.3 3.4*   CL 95 99   CO2 25 26   GLU 89 97   BUN 14 10   CREATININE 0.6 0.6   CALCIUM 8.1* 7.9*   PROT 5.8* 5.4*   ALBUMIN 2.3* 2.1*   BILITOT 0.3 0.3   ALKPHOS 222* 265*   AST 32 39   ALT 90* 114*   ANIONGAP 6* 5*   EGFRNONAA >60 >60     Respiratory Culture:   Recent Labs   Lab 01/31/19  1035   GSRESP <10 epithelial cells per low power field.  Many WBC's  Many budding yeast   RESPIRATORYC No growth       Significant Imaging: I have reviewed all pertinent imaging results/findings within the past 24 hours.

## 2019-02-13 NOTE — PROGRESS NOTES
Received bedside report. Patient awake, resting quietly in bed, telemetry monitoring in place, no distress noted. Safety maintained, call light in reach

## 2019-02-13 NOTE — SUBJECTIVE & OBJECTIVE
Interval History: Afebrile, no leukocytosis. No acute events overnight.   Awaiting itraconazole levels.  Patient continues to feel good. Appetite good. Denies complaints      Review of Systems   Constitutional: Negative for activity change, chills and fever.   HENT: Negative.    Respiratory: Positive for shortness of breath (improved). Negative for cough.    Gastrointestinal: Negative for diarrhea, nausea and vomiting.   Genitourinary: Negative for difficulty urinating.   Musculoskeletal: Negative for back pain.   Skin: Negative for wound.   Neurological: Positive for weakness.   Psychiatric/Behavioral: Negative for agitation and confusion.     Objective:     Vital Signs (Most Recent):  Temp: 98.8 °F (37.1 °C) (02/13/19 0711)  Pulse: 97 (02/13/19 0711)  Resp: 17 (02/13/19 0711)  BP: (!) 97/59 (02/13/19 0711)  SpO2: 95 % (02/13/19 0711) Vital Signs (24h Range):  Temp:  [98.1 °F (36.7 °C)-99.3 °F (37.4 °C)] 98.8 °F (37.1 °C)  Pulse:  [] 97  Resp:  [16-18] 17  SpO2:  [94 %-98 %] 95 %  BP: ()/(56-60) 97/59     Weight: 55.7 kg (122 lb 12.7 oz)  Body mass index is 19.82 kg/m².    Estimated Creatinine Clearance: 125.1 mL/min (based on SCr of 0.6 mg/dL).    Physical Exam   Constitutional: He appears cachectic. No distress.   HENT:   Head: Normocephalic and atraumatic.   Eyes: Conjunctivae are normal. No scleral icterus.   Neck: Normal range of motion. Neck supple.   Cardiovascular: Regular rhythm and normal heart sounds. Tachycardia present. Exam reveals no friction rub.   No murmur heard.  Pulmonary/Chest: Effort normal and breath sounds normal. No respiratory distress. He has no wheezes.   Fine crackles left base     Abdominal: Soft. He exhibits no distension. There is no tenderness. There is no guarding.   Musculoskeletal: He exhibits no tenderness.   Skin: Skin is warm. He is not diaphoretic. No erythema. No pallor.   Psychiatric: He has a normal mood and affect.   Vitals reviewed.      Significant Labs:    Blood Culture:   Recent Labs   Lab 01/28/19  1023 01/28/19  1028   LABBLOO No growth after 5 days. No growth after 5 days.     CBC:   No results for input(s): WBC, HGB, HCT, PLT in the last 48 hours.  CMP:   Recent Labs   Lab 02/12/19  0539 02/13/19  0513   * 130*   K 4.3 3.4*   CL 95 99   CO2 25 26   GLU 89 97   BUN 14 10   CREATININE 0.6 0.6   CALCIUM 8.1* 7.9*   PROT 5.8* 5.4*   ALBUMIN 2.3* 2.1*   BILITOT 0.3 0.3   ALKPHOS 222* 265*   AST 32 39   ALT 90* 114*   ANIONGAP 6* 5*   EGFRNONAA >60 >60     Respiratory Culture:   Recent Labs   Lab 01/31/19  1035   GSRESP <10 epithelial cells per low power field.  Many WBC's  Many budding yeast   RESPIRATORYC No growth       Significant Imaging: I have reviewed all pertinent imaging results/findings within the past 24 hours.

## 2019-02-13 NOTE — ASSESSMENT & PLAN NOTE
42 y/o Mauritian speaking male with no significant medical history presents with progressively worsening SOB, cough, weakness and weight loss. Denied hemoptysis or sputum production. He was found to be positive for HIV with CD4 count of 11.  ID work up initiated:    Blood cultures 1/28 NGTD  CXR with concerns for PCP pneumonia vs pulmonary histoplasmosis.   CTA shows 1.8 cm irregular, cavitary opacity within the anterior segment of the right upper lobe  Pt s/p bronchoscopy; AFB cultures NGTD  TB PCR negative.   QG indeterminate X2  Fungitell positive  Crypto negative  C diff negative; stool culture negative  RPR/toxo negative  Repeat Aspergillus antigen positive, however, there can be cross reaction.    Urine histo positive  Fungal immunodiffusion assay - aspergillus negative, histoplasma positive      Given positive histoplasma in urine and fungal assay, suspect this is pulmonary histoplasmosis rather than PJP.   Dr. Poon had called pathology to ask to run silver stain on cytology to r/o PCP and this is pending, but greater suspicion for histoplasmosis and patient is now on itraconzole.     Patient completed loading dose of itraconazole.  Itraconazole level is .2 .       Patient reports he feels much better.  No complaints.    Plan/discharge recommendations:  1. Continue bactrim 1 DS tablet daily for PJP ppx   2. Continue Itraconazole 200 mg orally twice a day for histoplasma.  3. Recommend itraconazole level in 14 days.   4  Patient to establish as outpatient with Dr. Shine at [facility] within 14 days for HIV/HAART therapy.  Understand this has been arranged.  Dr. Shine may contact Dr. Manas Poon, [place], [facility] for any questions.      Data reviewed and plan discussed with ID staff  Discussed with Primary Team

## 2019-02-13 NOTE — PROGRESS NOTES
"[facility] Medicine  Progress Note    Patient Name: Lior Prado  MRN: [id number]  Patient Class: IP- Inpatient   Admission Date: 1/28/2019  Length of Stay: 15 days  Attending Physician: Nhung Carter MD  Primary Care Provider: Primary Doctor No        Subjective:     Principal Problem:AIDS    HPI:  Mr. Donnelly is a 44 yo M with no reported past medical history- presents with a CC of weakness and some SOB for one week. He notes that he was treated with abx for a "pneumonia" 1/13-1/23.  He presents to the ED and is found to have thrush. Further, his HIV is +. The patient was started on treatment for PJP pneumonia and ID was consulted. The patient is thin and ill appearing.      Hospital Course:  Mr. Donnelly presented with shortness of breath and was admitted for treatment of PJP pneumonia and ID was consulted. The patient had a CTA of his chest that was negative for PE but was remarkable for a 1.8 cm irregular, cavitary opacity within the anterior segment of the right upper lobe, innumerable randomly distributed bilateral pulmonary nodules, patchy areas of subsegmental consolidation and nodular thickening of the right major and minor fissures, and mediastinal adenopathy.  Patient was started on empiric IV Bactrim and steroids.  He was transferred to the ICU due to high O2 requirements, rising lactate, and marginal blood pressures.  Pancytopenia was also present and worsened.  He was transfused 1 unit of platelets.  His blood pressures responded to IV fluid to alone and he did not require vasopressors. Pulmonology consulted for possible bronch in new diagnosis of HIV, and concern for PJP.  ID was also consulted.  On 1/30, CD4 count resulted at 11.  ID recommended bactrim and rocephin for probable PJP and CAP, respectively. Diflucan for oral candida. Pancytopenia likely due to sepsis, possible opportunistic infections and AIDS. Transaminases and lactate/LDH rising. On 1/31, " patient underwent bronchoscopy and remained on the ventilator postprocedure.  He was later extubated on 2/01 with use of BiPAP p.r.n. Rapid AFB stain resulted negative and was taken off isolation on 2/2.  His oxygen requirements continued to decline and liver function tests continue to improve.  He was stable for transfer to floor on 2/3. Oxygen requirements improving with intermittent doses of IV furosemide 20 mg for 3 days.     No new subjective & objective note has been filed under this hospital service since the last note was generated.    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 However, histoplasma workup positive therefore there is a question about this being histo rather than PJP. Itrconazole initiated pending GMS stain of cytology specimen (requested by ID)  Aspergillus antigen positive. Cross reactive?   Plan to initiate antiretroviral therapy as outpatient once further testing done. However, patient states he does not want to initiate antiretroviral therapy because it is toxic. I spent > 5 min discussing the dangers of not treating HIV/AIDS (in [place]). Verbalized understanding but still wants to antiretroviral therapy.   Patient is undocumented, and resources will be difficult.    consulted for community resources. ID recommends f/u at [facility] with Dr Shine where there is [place] speaking staff. I discussed this at [facility]    Of note, patient now states he  will stay in [place] and is motivated to f/u at [facility]     Acute hypoxemic respiratory failure    Secondary to probable PJP  TB has been ruled out  Pulmonary following and patient is status post bronch  Extubated on 2/1  Stable at room air but still tachycardic.Hold off on scheduled nebs     Pancytopenia    Secondary to AIDS and/or medication induced  Thrombocytopenia 12k s/p transfusion of 1 unit platelets prior to bronch  Appreciate Heme-Onc input  Platelets overall stable  Will continue to monitor     Elevated LFTs    Likely due to sepsis and/or AIDS  Hepatitis panel negative  No abdominal pain and bilirubin not suggesting obstructive pattern or HIV cholangiopathy   U/S with fatty liver   LFTs are improving, will continue to monitor         Malnutrition of moderate degree    Pt did not comment on changes in diet  Due to AIDS  Will supplement nutrition with boost glucose control     Tobacco abuse    Smoking cessation education with > 3 min of counseling  Nicotine patch offered        Hyponatremia    Improved with IVF  Will continue monitor.     Weakness    Much better. Independent       Thrush    Thrush no longer present  Fluconazole discontinued     Pneumonia, unspecified organism    Presumed PJP insetting of AIDS  Rocephin discontinued by ID  Bactrim now transitioned to PO. On prednisone. Tapering               VTE Risk Mitigation (From admission, onward)        Ordered     IP VTE LOW RISK PATIENT  Once      01/28/19 1927     Place DENY hose  Until discontinued      01/28/19 1927          Dispo: tentative discharge tomorrow pending finalized ID recs. Patient requesting assistance with cost of medications.     Nhung Carter MD  Department of Hospital Medicine   [facility]

## 2019-02-13 NOTE — PLAN OF CARE
02/11/19 1442   Discharge Reassessment   Assessment Type Discharge Planning Reassessment   Do you have any problems affording any of your prescribed medications? TBD   Discharge Plan A Home   Discharge Plan B Home   DME Needed Upon Discharge  none   Patient choice form signed by patient/caregiver N/A   Anticipated Discharge Disposition Home   Can the patient answer the patient profile reliably? Yes, cognitively intact   How does the patient rate their overall health at the present time? Poor   Describe the patient's ability to walk at the present time. Minor restrictions or changes   How often would a person be available to care for the patient? Infrequently   Number of comorbid conditions (as recorded on the chart) Five or more   During the past month, has the patient often been bothered by feeling down, depressed or hopeless? Yes   During the past month, has the patient often been bothered by little interest or pleasure in doing things? Yes   Post-Acute Status   Post-Acute Authorization (home)

## 2019-02-13 NOTE — PLAN OF CARE
Problem: Skin Injury Risk Increased  Goal: Skin Health and Integrity  Outcome: Outcome(s) achieved Date Met: 02/13/19  Intervention: Optimize Skin Protection   02/13/19 1701   Prevent Additional Skin Injury   Head of Bed (HOB) HOB at 30-45 degrees   Pressure Reduction Devices pressure-redistributing mattress utilized   Pressure Reduction Techniques frequent weight shift encouraged;heels elevated off bed

## 2019-02-13 NOTE — PLAN OF CARE
Problem: Posttrauma Syndrome Risk or Actual  Goal: Decreased Posttrauma Symptoms    Intervention: Support Coping and Recovery   02/11/19 1920 02/12/19 0314   Promote Anxiety Reduction   Supportive Measures --  active listening utilized;relaxation techniques promoted   Monitor/Manage Chemotherapy Gastrointestinal Effects   Environmental Support calm environment promoted --

## 2019-02-13 NOTE — PLAN OF CARE
Problem: Spiritual Distress Risk or Actual  Goal: Spiritual Wellbeing    Intervention: Promote Spiritual Wellbeing   02/12/19 0314   Promote Anxiety Reduction   Supportive Measures active listening utilized;relaxation techniques promoted

## 2019-02-14 NOTE — PT/OT/SLP DISCHARGE
Physical Therapy Discharge Summary    Name: Lior Prado  MRN: [id number]   Principal Problem: AIDS     Patient Discharged from acute Physical Therapy on 19.  Please refer to prior PT notes for functional status.     Assessment:     Patient appropriate for care in another setting.    Objective:     GOALS:   Multidisciplinary Problems     Physical Therapy Goals        Problem: Physical Therapy Goal    Goal Priority Disciplines Outcome Goal Variances Interventions   Physical Therapy Goal     PT, PT/OT Ongoing (interventions implemented as appropriate)     Description:  Goals to be met by: 19     Patient will increase functional independence with mobility by performin. Supine to sit with [place]  2. Rolling to Left and Right with [place]  3. Sit to stand transfer with [place]  4. Bed to chair transfer with [place]   5. Gait  >250 feet with [place]   6. Upper/Lower extremity exercise program 3 sets x10 reps per handout, with independence                      Reasons for Discontinuation of Therapy Services  Transfer to alternate level of care.      Plan:     Patient Discharged to: Outpatient Therapy Services recommended.    Lakisha Mcgregor, PT  2019

## 2019-02-20 LAB — BACTERIA ISLT: NORMAL

## 2019-02-21 LAB
FUNGUS SPEC CULT: NORMAL
FUNGUS SPEC CULT: NORMAL

## 2019-02-21 NOTE — DISCHARGE SUMMARY
"[facility]  Discharge Summary      Patient Name: Lior Prado  MRN: [id number]  Admission Date: 1/28/2019  Hospital Length of Stay: 16 days  Discharge Date and Time: 2/13.2019  Attending Physician: No att. providers found   Discharging Provider: Nhung Carter MD  Primary Care Provider: Primary Doctor Jo-Ann      HPI:   Mr. Donnelly is a 42 yo M with no reported past medical history- presents with a CC of weakness and some SOB for one week. He notes that he was treated with abx for a "pneumonia" 1/13-1/23.  He presents to the ED and is found to have thrush. Further, his HIV is +. The patient was started on treatment for PJP pneumonia and ID was consulted. The patient is thin and ill appearing.      Procedure(s) (LRB):  Bronchoscopy (N/A)      Hospital Course:   Mr. Donnelly presented with shortness of breath and was admitted for treatment of PJP pneumonia and ID was consulted. The patient had a CTA of his chest that was negative for PE but was remarkable for a 1.8 cm irregular, cavitary opacity within the anterior segment of the right upper lobe, innumerable randomly distributed bilateral pulmonary nodules, patchy areas of subsegmental consolidation and nodular thickening of the right major and minor fissures, and mediastinal adenopathy.  Patient was started on empiric IV Bactrim and steroids.  He was transferred to the ICU due to high O2 requirements, rising lactate, and marginal blood pressures.  Pancytopenia was also present and worsened.  He was transfused 1 unit of platelets.  His blood pressures responded to IV fluid to alone and he did not require vasopressors. Pulmonology consulted for possible bronch in new diagnosis of HIV, and concern for PJP.  ID was also consulted.  On 1/30, CD4 count resulted at 11.  ID recommended bactrim and rocephin for probable PJP and CAP, respectively. Diflucan for oral candida. Pancytopenia likely due to sepsis, possible opportunistic " infections and AIDS. Transaminases and lactate/LDH rising. On 1/31, patient underwent bronchoscopy and remained on the ventilator postprocedure.  He was later extubated on 2/01 with use of BiPAP p.r.n. Rapid AFB stain resulted negative and was taken off isolation on 2/2.  His oxygen requirements continued to decline and liver function tests continue to improve.  He was stable for transfer to floor on 2/3. Oxygen requirements improving with intermittent doses of IV furosemide 20 mg for 3 days.     Pt was weaned from oxygen and did not meet criteria for home oxygen on discharge home. ID made final recommendations:  Histoplasma pneumonia - itraconazole BID, repeat level in 14 days  Bactrim DS daily  - PJP ppx  Follow up Dr[facility] Shine @[facility]  - HIV/HAART therapy      Consults:   Consults (From admission, onward)        Status Ordering Provider     Inpatient consult to Hematology/Oncology - [facility]  Once     Provider:  Caro Rendon MD    Completed XAVIER ABDALLA     Inpatient consult to Infectious Diseases  Once     Provider:  Dania Rogers PA-C    Completed SUSANNE FARRELL     Inpatient consult to Pulmonology  Once     Provider:  Gigi Powers MD    Completed XAVIER ABDALLA     Inpatient consult to Registered Dietitian/Nutritionist  Once     Provider:  (Not yet assigned)    Completed RIMA BERRY          No new Assessment & Plan notes have been filed under this hospital service since the last note was generated.  Service: Hospital Medicine    Final Active Diagnoses:    Diagnosis Date Noted POA    PRINCIPAL PROBLEM:  AIDS [B20] 01/28/2019 Yes    HIV disease [B20]  Yes    Pancytopenia [D61.818] 01/29/2019 Yes    Acute hypoxemic respiratory failure [J96.01] 01/29/2019 Yes    Pneumonia, unspecified organism [J18.9] 01/28/2019 Yes    Thrush [B37.0] 01/28/2019 Yes    Weakness [R53.1] 01/28/2019 Yes    Hyponatremia [E87.1] 01/28/2019 Yes    Tobacco abuse [Z72.0] 01/28/2019 Yes     Malnutrition of moderate degree [E44.0] 01/28/2019 Yes    Elevated LFTs [R94.5] 01/28/2019 Yes      Problems Resolved During this Admission:    Diagnosis Date Noted Date Resolved POA    Sepsis [A41.9]  02/13/2019 Yes       Discharged Condition: fair    Disposition: Home or Self Care    Follow Up:  Follow-up Information     [facility]/ NO AIDS Taskforce.    Why:  call to schedule an appointment with clinic at time of discharge.  Contact information:  [address]  [place], [place]  [phone]               Patient Instructions:      Diet Adult Regular     Notify your health care provider if you experience any of the following:  temperature >100.4     Notify your health care provider if you experience any of the following:  persistent nausea and vomiting or diarrhea     Notify your health care provider if you experience any of the following:  difficulty breathing or increased cough     Activity as tolerated           Pending Diagnostic Studies:     Procedure Component Value Units Date/Time    Cytology Specimen-Medical Cytology (Fluid/Wash/Brush) [[id number]] Collected:  01/31/19 1533    Order Status:  Sent Lab Status:  No result     Specimen:  Bronch wash          Medications:  Reconciled Home Medications:      Medication List      START taking these medications    itraconazole 100 mg Cap  Commonly known as:  SPORANOX  Take 2 capsules (200 mg total) by mouth 2 (two) times daily.     predniSONE 10 MG tablet  Commonly known as:  DELTASONE  Take one tablet oral daily x 3 days, half tablet (5mg) oral daily x 2 days then stop     sulfamethoxazole-trimethoprim 800-160mg 800-160 mg Tab  Commonly known as:  BACTRIM DS  Take 1 tablet by mouth once daily.        STOP taking these medications    famotidine 20 MG tablet  Commonly known as:  PEPCID            Indwelling Lines/Drains at time of discharge:   Lines/Drains/Airways          None          Time spent on the discharge of patient: 45 minutes  Patient was seen  and examined on the date of discharge and determined to be suitable for discharge.         Nhung Carter MD  Department of Hospital Medicine  [facility]

## 2019-04-04 LAB
ACID FAST MOD KINY STN SPEC: NORMAL
MYCOBACTERIUM SPEC QL CULT: NORMAL

## 2019-04-05 LAB — MYCOBACTERIUM SPEC QL CULT: NORMAL

## 2023-05-03 NOTE — ASSESSMENT & PLAN NOTE
Followed by ID   CD4 count 11  Pt undergoing  Bactrim and steroids for PJP pneumonia prophylaxis.      Xeljanz Counseling: I discussed with the patient the risks of Xeljanz therapy including increased risk of infection, liver issues, headache, diarrhea, or cold symptoms. Live vaccines should be avoided. They were instructed to call if they have any problems.

## 2023-10-31 ENCOUNTER — TELEPHONE (OUTPATIENT)
Dept: ENDOSCOPY | Facility: HOSPITAL | Age: 48
End: 2023-10-31
Payer: COMMERCIAL

## 2023-10-31 NOTE — TELEPHONE ENCOUNTER
----- Message from Naila Sexton sent at 10/16/2023  9:01 AM CDT -----    ----- Message -----  From: Carmita Conte  Sent: 10/13/2023   3:08 PM CDT  To: [facility] Endo Schedulers    Good afternoon,    Patient has a referral scanned into  for a colonoscopy. Please call patient to schedule.    Thank you,  Carmita LUGO.  [facility]

## 2024-02-21 ENCOUNTER — TELEPHONE (OUTPATIENT)
Dept: ENDOSCOPY | Facility: HOSPITAL | Age: 49
End: 2024-02-21
Payer: COMMERCIAL

## 2024-02-21 NOTE — TELEPHONE ENCOUNTER
Called patient to schedule for a colonoscopy. Patient did not answer the call. Voicemail option not given. Unable to leave message. Unable to reach you letter mailed.

## 2024-02-21 NOTE — TELEPHONE ENCOUNTER
Received message from DESTINI BRASHER is  asking to schedule endoscopy procedure for pt MRN:  [id number] pt only speak Urdu

## 2024-02-21 NOTE — TELEPHONE ENCOUNTER
Endoscopy Scheduling Department  [facility] [address] [place] [address]  Take the [facility] Elevators to 4th Floor Endoscopy Lab      Date: 2/21/24      Medical Record # [id number]      Dear  Mr. Lior Prado    An order for the following procedure(s) Colonoscopy was placed for you by   Doreen Langford MD.    Please call the scheduling nurse to schedule this procedure as soon as possible.    If you have already scheduled this appointment, please disregard this letter. If you would like to cancel this request, please call the number listed below.     Sincerely,        Endoscopy Scheduling Department [phone]        Comments: Office hours are Monday through Friday 8-430p.